# Patient Record
Sex: MALE | Race: BLACK OR AFRICAN AMERICAN | NOT HISPANIC OR LATINO | Employment: FULL TIME | URBAN - METROPOLITAN AREA
[De-identification: names, ages, dates, MRNs, and addresses within clinical notes are randomized per-mention and may not be internally consistent; named-entity substitution may affect disease eponyms.]

---

## 2017-03-08 ENCOUNTER — OFFICE VISIT (OUTPATIENT)
Dept: FAMILY MEDICINE | Facility: CLINIC | Age: 47
End: 2017-03-08
Payer: COMMERCIAL

## 2017-03-08 VITALS
WEIGHT: 231.3 LBS | HEART RATE: 64 BPM | SYSTOLIC BLOOD PRESSURE: 138 MMHG | DIASTOLIC BLOOD PRESSURE: 88 MMHG | RESPIRATION RATE: 16 BRPM | HEIGHT: 66 IN | BODY MASS INDEX: 37.17 KG/M2 | OXYGEN SATURATION: 100 % | TEMPERATURE: 98.2 F

## 2017-03-08 DIAGNOSIS — I10 ESSENTIAL HYPERTENSION WITH GOAL BLOOD PRESSURE LESS THAN 130/80: ICD-10-CM

## 2017-03-08 DIAGNOSIS — E11.65 TYPE 2 DIABETES MELLITUS WITH HYPERGLYCEMIA, WITHOUT LONG-TERM CURRENT USE OF INSULIN (H): Primary | ICD-10-CM

## 2017-03-08 DIAGNOSIS — M25.561 CHRONIC PAIN OF RIGHT KNEE: ICD-10-CM

## 2017-03-08 DIAGNOSIS — J01.10 ACUTE NON-RECURRENT FRONTAL SINUSITIS: ICD-10-CM

## 2017-03-08 DIAGNOSIS — G89.29 CHRONIC PAIN OF RIGHT KNEE: ICD-10-CM

## 2017-03-08 DIAGNOSIS — F41.8 SITUATIONAL ANXIETY: ICD-10-CM

## 2017-03-08 LAB — HBA1C MFR BLD: 11.5 % (ref 4.3–6)

## 2017-03-08 PROCEDURE — 99214 OFFICE O/P EST MOD 30 MIN: CPT | Mod: 25 | Performed by: FAMILY MEDICINE

## 2017-03-08 PROCEDURE — 83036 HEMOGLOBIN GLYCOSYLATED A1C: CPT | Performed by: FAMILY MEDICINE

## 2017-03-08 PROCEDURE — 36415 COLL VENOUS BLD VENIPUNCTURE: CPT | Performed by: FAMILY MEDICINE

## 2017-03-08 PROCEDURE — 99207 C FOOT EXAM  NO CHARGE: CPT | Mod: 25 | Performed by: FAMILY MEDICINE

## 2017-03-08 RX ORDER — AMLODIPINE BESYLATE 10 MG/1
10 TABLET ORAL DAILY
Qty: 90 TABLET | Refills: 1 | Status: SHIPPED | OUTPATIENT
Start: 2017-03-08 | End: 2017-12-04

## 2017-03-08 RX ORDER — CHLORTHALIDONE 25 MG/1
25 TABLET ORAL DAILY
Qty: 90 TABLET | Refills: 1 | Status: SHIPPED | OUTPATIENT
Start: 2017-03-08 | End: 2017-12-04

## 2017-03-08 RX ORDER — LIRAGLUTIDE 6 MG/ML
1.2 INJECTION SUBCUTANEOUS DAILY
Qty: 6 ML | Refills: 2 | Status: SHIPPED | OUTPATIENT
Start: 2017-03-08 | End: 2017-09-06

## 2017-03-08 RX ORDER — PIOGLITAZONEHYDROCHLORIDE 45 MG/1
45 TABLET ORAL DAILY
Qty: 90 TABLET | Refills: 1 | Status: SHIPPED | OUTPATIENT
Start: 2017-03-08 | End: 2017-12-04

## 2017-03-08 RX ORDER — OXYCODONE AND ACETAMINOPHEN 7.5; 325 MG/1; MG/1
1-2 TABLET ORAL 3 TIMES DAILY PRN
Qty: 150 TABLET | Refills: 0 | Status: SHIPPED | OUTPATIENT
Start: 2017-05-14 | End: 2017-06-02

## 2017-03-08 RX ORDER — LOSARTAN POTASSIUM 50 MG/1
50 TABLET ORAL DAILY
Qty: 90 TABLET | Refills: 1 | Status: SHIPPED | OUTPATIENT
Start: 2017-03-08 | End: 2017-12-04

## 2017-03-08 RX ORDER — OXYCODONE AND ACETAMINOPHEN 7.5; 325 MG/1; MG/1
1-2 TABLET ORAL 3 TIMES DAILY PRN
Qty: 150 TABLET | Refills: 0 | Status: SHIPPED | OUTPATIENT
Start: 2017-04-14 | End: 2017-03-08

## 2017-03-08 RX ORDER — AMOXICILLIN 875 MG
875 TABLET ORAL 2 TIMES DAILY
Qty: 20 TABLET | Refills: 0 | Status: SHIPPED | OUTPATIENT
Start: 2017-03-08 | End: 2017-05-24

## 2017-03-08 RX ORDER — ALPRAZOLAM 0.5 MG
TABLET ORAL
Qty: 30 TABLET | Refills: 0 | Status: SHIPPED | OUTPATIENT
Start: 2017-03-08 | End: 2018-09-10

## 2017-03-08 RX ORDER — OXYCODONE AND ACETAMINOPHEN 7.5; 325 MG/1; MG/1
1-2 TABLET ORAL 3 TIMES DAILY PRN
Qty: 150 TABLET | Refills: 0 | Status: SHIPPED | OUTPATIENT
Start: 2017-03-15 | End: 2017-03-08

## 2017-03-08 RX ORDER — GLIMEPIRIDE 4 MG/1
4 TABLET ORAL 2 TIMES DAILY
Qty: 180 TABLET | Refills: 1 | Status: SHIPPED | OUTPATIENT
Start: 2017-03-08 | End: 2017-12-04

## 2017-03-08 ASSESSMENT — ANXIETY QUESTIONNAIRES
1. FEELING NERVOUS, ANXIOUS, OR ON EDGE: NOT AT ALL
6. BECOMING EASILY ANNOYED OR IRRITABLE: NEARLY EVERY DAY
5. BEING SO RESTLESS THAT IT IS HARD TO SIT STILL: NOT AT ALL
3. WORRYING TOO MUCH ABOUT DIFFERENT THINGS: MORE THAN HALF THE DAYS
2. NOT BEING ABLE TO STOP OR CONTROL WORRYING: NOT AT ALL
IF YOU CHECKED OFF ANY PROBLEMS ON THIS QUESTIONNAIRE, HOW DIFFICULT HAVE THESE PROBLEMS MADE IT FOR YOU TO DO YOUR WORK, TAKE CARE OF THINGS AT HOME, OR GET ALONG WITH OTHER PEOPLE: SOMEWHAT DIFFICULT
7. FEELING AFRAID AS IF SOMETHING AWFUL MIGHT HAPPEN: NOT AT ALL
GAD7 TOTAL SCORE: 5

## 2017-03-08 ASSESSMENT — PAIN SCALES - GENERAL: PAINLEVEL: MODERATE PAIN (4)

## 2017-03-08 ASSESSMENT — PATIENT HEALTH QUESTIONNAIRE - PHQ9: 5. POOR APPETITE OR OVEREATING: NOT AT ALL

## 2017-03-08 NOTE — PROGRESS NOTES
SUBJECTIVE:                                                    Xavier Mcguire is a 46 year old male who presents to clinic today for the following health issues:    1. Pt states having some balance issues -    - HA on L side of forehead   - intermittent sx's    Diabetes Follow-up      Patient is checking blood sugars: rarely.  In 200s - pt requesting new test strips, lancets, meter    Diabetic concerns: BS's     Symptoms of hypoglycemia (low blood sugar): none     Paresthesias (numbness or burning in feet) or sores: No     Date of last diabetic eye exam: UTD     Hypertension Follow-up      Outpatient blood pressures are not being checked.    Low Salt Diet: no added salt     Depression Followup    Status since last visit: stress level is high    See PHQ-9 for current symptoms.  Other associated symptoms: stress    Complicating factors:   Significant life event:  No   Current substance abuse:  None  Anxiety or Panic symptoms:  No    PHQ-9  English PHQ-9   Any Language            Amount of exercise or physical activity: walking - 7-10 K steps daily    Problems taking medications regularly: pt states has missed some doses - hasn't been taking some meds    Medication side effects: none  Diet: regular (no restrictions)    SUBJECTIVE:  Here today originally scheduled to follow-up on diabetes, hypertension, obesity. Patient never started the victoza - no specific reason. I asked about insurance coverage and he said he wasn't sure. I discussed numerous times with the patient that we need to get his sugar under control and bring his weight down. He is at significantly elevated risk for severe vascular complications. 2-3 weeks ago he came down with a nonspecific illness that involved fever and chills as well as some respiratory symptoms. Had some nausea and decreased appetite. Symptoms are mostly fading away but he is left with a deep sided left headache and what sounds like some minor vertigo. Also concerned that his  "memory is affected though he did not  on any memory difficulties during our discussion. He also admits that he's under times of stress. Feels he has to do all the work around his house or nothing gets done. He takes care of multiple family members just feels like there is too much on his plate. He has a history of depression but says he does not feel depressed at all, just stressed. Knee continues to give him quite a bit of pain - the pain medication does help alleviate this.    Review of systems otherwise negative.  Past medical, family, and social history reviewed and updated in chart.    OBJECTIVE:  /88 (BP Location: Right arm, Patient Position: Right side, Cuff Size: Adult Regular)  Pulse 64  Temp 98.2  F (36.8  C) (Oral)  Resp 16  Ht 1.676 m (5' 6\")  Wt 104.9 kg (231 lb 4.8 oz)  SpO2 100%  BMI 37.33 kg/m2  Psych: Alert and oriented times 3; coherent speech, normal   rate and volume, able to articulate logical thoughts, able   to abstract reason, no tangential thoughts, no hallucinations   or delusions  His affect is appropriate  Ears normal. Throat and pharynx normal. Neck supple. No adenopathy or masses in the neck or supraclavicular regions. Sinuses non tender.   S1 and S2 normal, no murmurs, clicks, gallops or rubs. Regular rate and rhythm. Chest is clear; no wheezes or rales. No edema or JVD.  FEET: both sides normal; no swelling, tenderness or skin or vascular lesions.  Sensation is intact. Peripheral pulses are palpable. Toenails are normal.   Past labs reviewed with the patient.      ASSESSMENT / PLAN:  (E11.65) Type 2 diabetes mellitus with hyperglycemia, without long-term current use of insulin (H)  (primary encounter diagnosis)  Comment: A1c has not been well controlled and we need to get this and his weight down. Needs to take his medication and start working on lifestyle management. There is really nothing more I can add  Plan: metFORMIN (GLUCOPHAGE) 1000 MG tablet,         glimepiride " (AMARYL) 4 MG tablet, pioglitazone         (ACTOS) 45 MG tablet, order for DME, order for         DME, liraglutide (VICTOZA PEN) 18 MG/3ML soln,         Hemoglobin A1c            (J01.10) Acute non-recurrent frontal sinusitis  Comment: I think he has an ongoing sinus infection that is responsible for some of his symptoms. I like to get him started on antibiotics and if this is not helping we could consider imaging such as a CT scan  Plan: amoxicillin (AMOXIL) 875 MG tablet            (E66.01) Obesity, Class II, BMI 35-39.9, with comorbidity (H)  Comment: Discussion as above. I'm hoping lifestyle and Victoza can help  Plan:     (I10) Essential hypertension with goal blood pressure less than 130/80  Comment: Borderline control. Improved lifestyle  Plan: losartan (COZAAR) 50 MG tablet, chlorthalidone         (HYGROTON) 25 MG tablet, amLODIPine (NORVASC)         10 MG tablet            (F41.8) Situational anxiety  Comment: Discussed mechanism of action of the proposed medication, as well as potential effects, both good and bad.  Patient expressed understanding and agreed with treatment.   Plan: ALPRAZolam (XANAX) 0.5 MG tablet            (M25.561,  G89.29) Chronic pain of right knee  Comment: refilled x 3   Plan: oxyCODONE-acetaminophen (PERCOCET) 7.5-325 MG         per tablet, DISCONTINUED:         oxyCODONE-acetaminophen (PERCOCET) 7.5-325 MG         per tablet, DISCONTINUED:         oxyCODONE-acetaminophen (PERCOCET) 7.5-325 MG         per tablet            Follow up 3 months   S. Pa Terry MD    (Chart documentation completed in part with Dragon voice-recognition software.  Even though reviewed some grammatical, spelling, and word errors may remain.)

## 2017-03-08 NOTE — MR AVS SNAPSHOT
After Visit Summary   3/8/2017    Xavier Mcguire    MRN: [id number]           Patient Information     Date Of Birth          1970        Visit Information        Provider Department      3/8/2017 10:00 AM Lizett Terry MD [facility]        Today's Diagnoses     Type 2 diabetes mellitus with hyperglycemia, without long-term current use of insulin (H)    -  1    Acute non-recurrent frontal sinusitis        Obesity, Class II, BMI 35-39.9, with comorbidity (H)        Essential hypertension with goal blood pressure less than 130/80        Situational anxiety        Chronic pain of right knee           Follow-ups after your visit        Follow-up notes from your care team     Return in about 3 months (around 6/8/2017).      Who to contact     If you have questions or need follow up information about today's clinic visit or your schedule please contact [facility] directly at [phone].  Normal or non-critical lab and imaging results will be communicated to you by MyChart, letter or phone within 4 business days after the clinic has received the results. If you do not hear from us within 7 days, please contact the clinic through [facility] or phone. If you have a critical or abnormal lab result, we will notify you by phone as soon as possible.  Submit refill requests through [email or web address] or call your pharmacy and they will forward the refill request to us. Please allow 3 business days for your refill to be completed.          Additional Information About Your Visit        MyChart Information     [email or web address] gives you secure access to your electronic health record. If you see a primary care provider, you can also send messages to your care team and make appointments. If you have questions, please call your primary care clinic.  If you do not have a primary care provider, please call [phone] and they will assist you.        Care EveryWhere ID     This is your Care  "EveryWhere ID. This could be used by other organizations to access your [facility] medical records  [id number]        Your Vitals Were     Pulse Temperature Respirations Height Pulse Oximetry BMI (Body Mass Index)    64 98.2  F (36.8  C) (Oral) 16 1.676 m (5' 6\") 100% 37.33 kg/m2       Blood Pressure from Last 3 Encounters:   03/08/17 138/88   12/19/16 138/86   09/20/16 158/90    Weight from Last 3 Encounters:   03/08/17 104.9 kg (231 lb 4.8 oz)   12/19/16 109.8 kg (242 lb 1.6 oz)   09/20/16 108.7 kg (239 lb 9.6 oz)              We Performed the Following     DEPRESSION ACTION PLAN (DAP) Order [[id number]]     FOOT EXAM     Hemoglobin A1c          Today's Medication Changes          These changes are accurate as of: 3/8/17 12:47 PM.  If you have any questions, ask your nurse or doctor.               Start taking these medicines.        Dose/Directions    ALPRAZolam 0.5 MG tablet   Commonly known as:  XANAX   Used for:  Situational anxiety   Started by:  Lizett Terry MD        1/2 to 1 tab three times daily as needed   Quantity:  30 tablet   Refills:  0       amoxicillin 875 MG tablet   Commonly known as:  AMOXIL   Used for:  Acute non-recurrent frontal sinusitis   Started by:  Lizett Terry MD        Dose:  875 mg   Take 1 tablet (875 mg) by mouth 2 times daily   Quantity:  20 tablet   Refills:  0       oxyCODONE-acetaminophen 7.5-325 MG per tablet   Commonly known as:  PERCOCET   Used for:  Chronic pain of right knee   Started by:  Lizett Terry MD        Dose:  1-2 tablet   Start taking on:  5/14/2017   Take 1-2 tablets by mouth 3 times daily as needed for pain   Quantity:  150 tablet   Refills:  0         These medicines have changed or have updated prescriptions.        Dose/Directions    liraglutide 18 MG/3ML soln   Commonly known as:  VICTOZA PEN   This may have changed:  how much to take   Used for:  Type 2 diabetes mellitus with hyperglycemia, without long-term current use of " insulin (H)   Changed by:  Lizett Terry MD        Dose:  1.2 mg   Inject 1.2 mg Subcutaneous daily   Quantity:  6 mL   Refills:  2            Where to get your medicines      These medications were sent to [id number] - [place], [place] - [address] AT [facility] [address], [place] [address]     Phone:  [phone]     amLODIPine 10 MG tablet    amoxicillin 875 MG tablet    chlorthalidone 25 MG tablet    glimepiride 4 MG tablet    liraglutide 18 MG/3ML soln    losartan 50 MG tablet    metFORMIN 1000 MG tablet    pioglitazone 45 MG tablet         Some of these will need a paper prescription and others can be bought over the counter.  Ask your nurse if you have questions.     Bring a paper prescription for each of these medications     ALPRAZolam 0.5 MG tablet    order for DME    order for DME    oxyCODONE-acetaminophen 7.5-325 MG per tablet                Primary Care Provider Office Phone # Fax #    Lizett Terry [phone]       [address] [address]        Thank you!     Thank you for choosing [facility]  for your care. Our goal is always to provide you with excellent care. Hearing back from our patients is one way we can continue to improve our services. Please take a few minutes to complete the written survey that you may receive in the mail after your visit with us. Thank you!             Your Updated Medication List - Protect others around you: Learn how to safely use, store and throw away your medicines at www.disposemymeds.org.          This list is accurate as of: 3/8/17 12:47 PM.  Always use your most recent med list.                   Brand Name Dispense Instructions for use    ACE NOT PRESCRIBED (INTENTIONAL)      continuous prn for other ACE Inhibitor not prescribed due to Intolerance       albuterol 108 (90 BASE) MCG/ACT Inhaler    PROAIR  HFA/PROVENTIL HFA/VENTOLIN HFA    1 Inhaler    Inhale 2 puffs into the lungs every 4 hours as needed for shortness of breath / dyspnea or wheezing       ALPRAZolam 0.5 MG tablet    XANAX    30 tablet    1/2 to 1 tab three times daily as needed       amLODIPine 10 MG tablet    NORVASC    90 tablet    Take 1 tablet (10 mg) by mouth daily       amoxicillin 875 MG tablet    AMOXIL    20 tablet    Take 1 tablet (875 mg) by mouth 2 times daily       aspirin 81 MG tablet     100 tablet    Take 1 tablet by mouth daily.       chlorthalidone 25 MG tablet    HYGROTON    90 tablet    Take 1 tablet (25 mg) by mouth daily       fexofenadine 180 MG tablet    ALLEGRA    90 tablet    TAKE 1 TABLET BY MOUTH DAILY       glimepiride 4 MG tablet    AMARYL    180 tablet    Take 1 tablet (4 mg) by mouth 2 times daily       ipratropium - albuterol 0.5 mg/2.5 mg/3 mL 0.5-2.5 (3) MG/3ML neb solution    DUONEB    90 vial    Take 1 vial (3 mLs) by nebulization every 6 hours as needed for shortness of breath / dyspnea or wheezing       liraglutide 18 MG/3ML soln    VICTOZA PEN    6 mL    Inject 1.2 mg Subcutaneous daily       losartan 50 MG tablet    COZAAR    90 tablet    Take 1 tablet (50 mg) by mouth daily       metFORMIN 1000 MG tablet    GLUCOPHAGE    180 tablet    Take 1 tablet (1,000 mg) by mouth 2 times daily (with meals) with breakfast and dinner.       ONE TOUCH DELICA LANCETS Misc     200 each    1 Device 2 times daily       * order for DME     100 each    One touch delica strips testing 3 times a day.       * order for DME     200 each    accu check test strips and supplies to allow testing of blood sugar up to three times daily.  Refills for 1 year.       * order for DME     150 Units    Equipment being ordered: accucheck wagner strips and lancets Tests daily       * order for DME     100 Units    Equipment being ordered:  Test strips and lancets - per device and insurance plan. Tests daily. Disp: 100 Refill: 3       * order for DME      1 Device    Equipment being ordered: Nebulizer       * order for DME     1 Device    Equipment being ordered: Glucometer, per insurance plan Tests daily       * order for DME     100 Units    Equipment being ordered: Test strips and lancets - per device and insurance plan. Tests daily. Disp: 100 Refill: 3       oxyCODONE-acetaminophen 7.5-325 MG per tablet   Start taking on:  5/14/2017    PERCOCET    150 tablet    Take 1-2 tablets by mouth 3 times daily as needed for pain       pioglitazone 45 MG tablet    ACTOS    90 tablet    Take 1 tablet (45 mg) by mouth daily       STATIN NOT PRESCRIBED (INTENTIONAL)      continuous prn for other Statin not prescribed intentionally due to Other patient declines (This option does not exclude patient from measure)       STATIN NOT PRESCRIBED (INTENTIONAL)     0 each    1 each At Bedtime Statin not prescribed intentionally due to Other: not needed       tadalafil 20 MG tablet    CIALIS    10 tablet    Take 1 tablet (20 mg) by mouth daily as needed for erectile dysfunction       * Notice:  This list has 7 medication(s) that are the same as other medications prescribed for you. Read the directions carefully, and ask your doctor or other care provider to review them with you.

## 2017-03-08 NOTE — NURSING NOTE
"Chief Complaint   Patient presents with     Diabetes     Balance/ Vestibular     issue - with quick movements       Initial /88 (BP Location: Right arm, Patient Position: Right side, Cuff Size: Adult Regular)  Pulse 64  Temp 98.2  F (36.8  C) (Oral)  Resp 16  Ht 1.676 m (5' 6\")  Wt 104.9 kg (231 lb 4.8 oz)  SpO2 100%  BMI 37.33 kg/m2 Estimated body mass index is 37.33 kg/(m^2) as calculated from the following:    Height as of this encounter: 1.676 m (5' 6\").    Weight as of this encounter: 104.9 kg (231 lb 4.8 oz).  Medication Reconciliation: complete     Will Vilma RED      "

## 2017-03-09 ASSESSMENT — ANXIETY QUESTIONNAIRES: GAD7 TOTAL SCORE: 5

## 2017-03-09 ASSESSMENT — PATIENT HEALTH QUESTIONNAIRE - PHQ9: SUM OF ALL RESPONSES TO PHQ QUESTIONS 1-9: 7

## 2017-03-09 NOTE — PROGRESS NOTES
Xavier,  That sugar still looks awful.  Not sure what to say.  You have got to take your medicine and watch that diet.    STEW Terry M.D.

## 2017-04-11 ENCOUNTER — TRANSFERRED RECORDS (OUTPATIENT)
Dept: HEALTH INFORMATION MANAGEMENT | Facility: CLINIC | Age: 47
End: 2017-04-11

## 2017-05-24 ENCOUNTER — OFFICE VISIT (OUTPATIENT)
Dept: URGENT CARE | Facility: URGENT CARE | Age: 47
End: 2017-05-24
Payer: COMMERCIAL

## 2017-05-24 VITALS
HEART RATE: 98 BPM | WEIGHT: 244 LBS | BODY MASS INDEX: 39.38 KG/M2 | OXYGEN SATURATION: 97 % | TEMPERATURE: 101.5 F | DIASTOLIC BLOOD PRESSURE: 92 MMHG | SYSTOLIC BLOOD PRESSURE: 159 MMHG

## 2017-05-24 DIAGNOSIS — Z76.0 ENCOUNTER FOR MEDICATION REFILL: ICD-10-CM

## 2017-05-24 DIAGNOSIS — H66.002 ACUTE SUPPURATIVE OTITIS MEDIA OF LEFT EAR WITHOUT SPONTANEOUS RUPTURE OF TYMPANIC MEMBRANE, RECURRENCE NOT SPECIFIED: Primary | ICD-10-CM

## 2017-05-24 PROCEDURE — 99213 OFFICE O/P EST LOW 20 MIN: CPT | Performed by: NURSE PRACTITIONER

## 2017-05-24 RX ORDER — ALBUTEROL SULFATE 90 UG/1
2 AEROSOL, METERED RESPIRATORY (INHALATION) EVERY 6 HOURS
Qty: 1 INHALER | Refills: 0 | Status: SHIPPED | OUTPATIENT
Start: 2017-05-24 | End: 2019-02-11

## 2017-05-24 RX ORDER — AMOXICILLIN 500 MG/1
500 CAPSULE ORAL 3 TIMES DAILY
Qty: 21 CAPSULE | Refills: 0 | Status: SHIPPED | OUTPATIENT
Start: 2017-05-24 | End: 2017-05-31

## 2017-05-24 NOTE — MR AVS SNAPSHOT
After Visit Summary   5/24/2017    Xavier Mcguire    MRN: [id number]           Patient Information     Date Of Birth          1970        Visit Information        Provider Department      5/24/2017 8:00 PM Maia Danielle NP [facility]        Today's Diagnoses     Acute suppurative otitis media of left ear without spontaneous rupture of tympanic membrane, recurrence not specified    -  1    Encounter for medication refill          Care Instructions      Otitis Media (Middle-Ear Infection) in Adults  Otitis media is another name for a middle-ear infection. It means an infection behind your eardrum. This kind of ear infection can happen after any condition that keeps fluid from draining from the middle ear. These conditions include allergies, a cold, a sore throat, or a respiratory infection.  Middle-ear infections are common in children, but they can also happen in adults. An ear infection in an adult may mean a more serious problem than in a child. So you may need additional tests. If you have an ear infection, you should see your health care provider for treatment.  What are the types of middle-ear infections?  Infections can affect the middle ear in several ways. They are:    Acute otitis media. This middle-ear infection occurs suddenly. It causes swelling and redness. Fluid and mucus become trapped inside the ear. You can have a fever and ear pain.    Otitis media with effusion. Fluid (effusion) and mucus build up in the middle ear after the infection goes away. You may feel like your middle ear is full. This can continue for months and may affect your hearing.    Chronic otitis media with effusion. Fluid (effusion) remains in the middle ear for a long time. Or it builds up again and again, even though there is no infection. This type of middle-ear infection may be hard to treat. It may also affect your hearing.  Who is more likely to get a middle-ear infection?  You are  more likely to get an ear infection if you:    Smoke or are around someone who smokes    Have seasonal or year-round allergy symptoms    Have a cold or other upper respiratory infection  What causes a middle-ear infection?  The middle ear connects to the throat by a canal called the eustachian tube. This tube helps even out the pressure between the outer ear and the inner ear. A cold or allergy can irritate the tube or cause the area around it to swell. This can keep fluid from draining from the middle ear. The fluid builds up behind the eardrum. Bacteria and viruses can grow in this fluid. The bacteria and viruses cause the middle-ear infection.  What are the symptoms of a middle-ear infection?  Common symptoms of a middle-ear infection in adults are:    Pain in 1 or both ears    Drainage from the ear    Muffled hearing    Sore throat   You may also have a fever. Rarely, your balance can be affected.  These symptoms may be the same as for other conditions. It s important to talk with your health care provider if you think you have a middle-ear infection. If you have a high fever, severe pain behind your ear, or paralysis in your face, see your provider as soon as you can.  How is a middle-ear infection diagnosed?  Your health care provider will take a medical history and do a physical exam. He or she will look at the outer ear and eardrum with an otoscope. The otoscope is a lighted tool that lets your provider see inside the ear. A pneumatic otoscope blows a puff of air into the ear to check how well your eardrum moves. If you eardrum doesn t move well, it may mean you have fluid behind it.  Your provider may also do a test called tympanometry. This test tells how well the middle ear is working. It can find any changes in pressure in the middle ear. Your provider may test your hearing with a tuning fork.  How is a middle-ear infection treated?  A middle-ear infection may be treated with:    Antibiotics, taken by  mouth or as ear drops    Medication for pain    Decongestants, antihistamines, or nasal steroids  Your health care provider may also have you try autoinsufflation. This helps adjust the air pressure in your ear. For this, you pinch your nose and gently exhale. This forces air back through the eustachian tube.  The exact treatment for your ear infection will depend on the type of infection you have. In general, if your symptoms don t get better in 48 to 72 hours, contact your health care provider.  Middle-ear infections can cause long-term problems if not treated. They can lead to:    Infection in other parts of the head    Permanent hearing loss    Paralysis of a nerve in your face  If you have a middle-ear infection that doesn t get better, you may need to see an ear, nose, and throat specialist (otolaryngologist). You may need a CT scan or MRI to check for head and neck cancer.  Ear tubes  Sometimes fluid stays in the middle ear even after you take antibiotics and the infection goes away. In this case, your health care provider may suggest that a small tube be placed in your ear. The tube is put at the opening of the eardrum. The tube keeps fluid from building up and relieves pressure in the middle ear. It can also help you hear better. This surgery is called myringotomy. It is not often done in adults.  The tubes usually fall out on their own after 6 months to a year.    2796-4207 [employer]. [address] [place], [place] [address]. All rights reserved. This information is not intended as a substitute for professional medical care. Always follow your healthcare professional's instructions.                Follow-ups after your visit        Who to contact     If you have questions or need follow up information about today's clinic visit or your schedule please contact [facility] directly at [phone].  Normal or non-critical lab and imaging results will be communicated to you  by [employer], letter or phone within 4 business days after the clinic has received the results. If you do not hear from us within 7 days, please contact the clinic through BEETmobile or phone. If you have a critical or abnormal lab result, we will notify you by phone as soon as possible.  Submit refill requests through BEETmobile or call your pharmacy and they will forward the refill request to us. Please allow 3 business days for your refill to be completed.          Additional Information About Your Visit        BEETmobile Information     BEETmobile gives you secure access to your electronic health record. If you see a primary care provider, you can also send messages to your care team and make appointments. If you have questions, please call your primary care clinic.  If you do not have a primary care provider, please call [phone] and they will assist you.        Care EveryWhere ID     This is your Care EveryWhere ID. This could be used by other organizations to access your [place] medical records  [id number]        Your Vitals Were     Pulse Temperature Pulse Oximetry BMI (Body Mass Index)          98 101.5  F (38.6  C) (Oral) 97% 39.38 kg/m2         Blood Pressure from Last 3 Encounters:   05/24/17 (!) 159/92   03/08/17 138/88   12/19/16 138/86    Weight from Last 3 Encounters:   05/24/17 244 lb (110.7 kg)   03/08/17 231 lb 4.8 oz (104.9 kg)   12/19/16 242 lb 1.6 oz (109.8 kg)              Today, you had the following     No orders found for display         Today's Medication Changes          These changes are accurate as of: 5/24/17  8:21 PM.  If you have any questions, ask your nurse or doctor.               Start taking these medicines.        Dose/Directions    amoxicillin 500 MG capsule   Commonly known as:  AMOXIL   Used for:  Acute suppurative otitis media of left ear without spontaneous rupture of tympanic membrane, recurrence not specified   Started by:  Maia Danielle NP        Dose:  500 mg   Take 1 capsule  (500 mg) by mouth 3 times daily for 7 days   Quantity:  21 capsule   Refills:  0         These medicines have changed or have updated prescriptions.        Dose/Directions    * albuterol 108 (90 BASE) MCG/ACT Inhaler   Commonly known as:  PROAIR HFA/PROVENTIL HFA/VENTOLIN HFA   This may have changed:  Another medication with the same name was added. Make sure you understand how and when to take each.   Used for:  Acute bronchitis, unspecified organism   Changed by:  Maye Dale PA-C        Dose:  2 puff   Inhale 2 puffs into the lungs every 4 hours as needed for shortness of breath / dyspnea or wheezing   Quantity:  1 Inhaler   Refills:  3       * albuterol 108 (90 BASE) MCG/ACT Inhaler   Commonly known as:  albuterol   This may have changed:  You were already taking a medication with the same name, and this prescription was added. Make sure you understand how and when to take each.   Used for:  Encounter for medication refill   Changed by:  Maia Danielle NP        Dose:  2 puff   Inhale 2 puffs into the lungs every 6 hours for 7 days   Quantity:  1 Inhaler   Refills:  0       * Notice:  This list has 2 medication(s) that are the same as other medications prescribed for you. Read the directions carefully, and ask your doctor or other care provider to review them with you.         Where to get your medicines      These medications were sent to [facility] [address] [facility] [address] [facility]  [address] [facility] [address]    Hours:  24-hours Phone:  [phone]     albuterol 108 (90 BASE) MCG/ACT Inhaler    amoxicillin 500 MG capsule                Primary Care Provider Office Phone # Fax #    Lizett Terry [phone]       [address] [address]        Thank you!     Thank you for choosing [facility]  for your care. Our goal is always to  provide you with excellent care. Hearing back from our patients is one way we can continue to improve our services. Please take a few minutes to complete the written survey that you may receive in the mail after your visit with us. Thank you!             Your Updated Medication List - Protect others around you: Learn how to safely use, store and throw away your medicines at www.disposemymeds.org.          This list is accurate as of: 5/24/17  8:21 PM.  Always use your most recent med list.                   Brand Name Dispense Instructions for use    ACE NOT PRESCRIBED (INTENTIONAL)      continuous prn for other ACE Inhibitor not prescribed due to Intolerance       * albuterol 108 (90 BASE) MCG/ACT Inhaler    PROAIR HFA/PROVENTIL HFA/VENTOLIN HFA    1 Inhaler    Inhale 2 puffs into the lungs every 4 hours as needed for shortness of breath / dyspnea or wheezing       * albuterol 108 (90 BASE) MCG/ACT Inhaler    albuterol    1 Inhaler    Inhale 2 puffs into the lungs every 6 hours for 7 days       ALPRAZolam 0.5 MG tablet    XANAX    30 tablet    1/2 to 1 tab three times daily as needed       amLODIPine 10 MG tablet    NORVASC    90 tablet    Take 1 tablet (10 mg) by mouth daily       amoxicillin 500 MG capsule    AMOXIL    21 capsule    Take 1 capsule (500 mg) by mouth 3 times daily for 7 days       aspirin 81 MG tablet     100 tablet    Take 1 tablet by mouth daily.       chlorthalidone 25 MG tablet    HYGROTON    90 tablet    Take 1 tablet (25 mg) by mouth daily       fexofenadine 180 MG tablet    ALLEGRA    90 tablet    TAKE 1 TABLET BY MOUTH DAILY       glimepiride 4 MG tablet    AMARYL    180 tablet    Take 1 tablet (4 mg) by mouth 2 times daily       ipratropium - albuterol 0.5 mg/2.5 mg/3 mL 0.5-2.5 (3) MG/3ML neb solution    DUONEB    90 vial    Take 1 vial (3 mLs) by nebulization every 6 hours as needed for shortness of breath / dyspnea or wheezing       liraglutide 18 MG/3ML soln    VICTOZA PEN    6 mL     Inject 1.2 mg Subcutaneous daily       losartan 50 MG tablet    COZAAR    90 tablet    Take 1 tablet (50 mg) by mouth daily       metFORMIN 1000 MG tablet    GLUCOPHAGE    180 tablet    Take 1 tablet (1,000 mg) by mouth 2 times daily (with meals) with breakfast and dinner.       ONE TOUCH DELICA LANCETS Misc     200 each    1 Device 2 times daily       * order for DME     100 each    One touch delica strips testing 3 times a day.       * order for DME     200 each    accu check test strips and supplies to allow testing of blood sugar up to three times daily.  Refills for 1 year.       * order for DME     150 Units    Equipment being ordered: accucheck wagner strips and lancets Tests daily       * order for DME     100 Units    Equipment being ordered:  Test strips and lancets - per device and insurance plan. Tests daily. Disp: 100 Refill: 3       * order for DME     1 Device    Equipment being ordered: Nebulizer       * order for DME     1 Device    Equipment being ordered: Glucometer, per insurance plan Tests daily       * order for DME     100 Units    Equipment being ordered: Test strips and lancets - per device and insurance plan. Tests daily. Disp: 100 Refill: 3       oxyCODONE-acetaminophen 7.5-325 MG per tablet    PERCOCET    150 tablet    Take 1-2 tablets by mouth 3 times daily as needed for pain       pioglitazone 45 MG tablet    ACTOS    90 tablet    Take 1 tablet (45 mg) by mouth daily       STATIN NOT PRESCRIBED (INTENTIONAL)      continuous prn for other Statin not prescribed intentionally due to Other patient declines (This option does not exclude patient from measure)       STATIN NOT PRESCRIBED (INTENTIONAL)     0 each    1 each At Bedtime Statin not prescribed intentionally due to Other: not needed       tadalafil 20 MG tablet    CIALIS    10 tablet    Take 1 tablet (20 mg) by mouth daily as needed for erectile dysfunction       * Notice:  This list has 9 medication(s) that are the same as other  medications prescribed for you. Read the directions carefully, and ask your doctor or other care provider to review them with you.

## 2017-05-25 NOTE — PROGRESS NOTES
SUBJECTIVE:                                                    Xavier Mcguire is a 47 year old male who presents to clinic today for the following health issues:      RESPIRATORY SYMPTOMS      Duration: 1-2 days    Description  nasal congestion, rhinorrhea, sore throat, cough, fever, chills, ear pain left and fatigue/malaise    Severity: moderate    Accompanying signs and symptoms: None    History (predisposing factors):  none    Precipitating or alleviating factors: None    Therapies tried and outcome:  rest and fluids OTC NSAID        Allergies   Allergen Reactions     Lisinopril Other (See Comments)       Past Medical History:   Diagnosis Date     Diabetes 2005     HTN (hypertension)      Hypertension goal BP (blood pressure) < 130/80 3/1/2011     Major depressive disorder, single episode, severe, without mention of psychotic behavior 8/5/2010     Obesity          Current Outpatient Prescriptions on File Prior to Visit:  ALPRAZolam (XANAX) 0.5 MG tablet 1/2 to 1 tab three times daily as needed   metFORMIN (GLUCOPHAGE) 1000 MG tablet Take 1 tablet (1,000 mg) by mouth 2 times daily (with meals) with breakfast and dinner.   glimepiride (AMARYL) 4 MG tablet Take 1 tablet (4 mg) by mouth 2 times daily   pioglitazone (ACTOS) 45 MG tablet Take 1 tablet (45 mg) by mouth daily   losartan (COZAAR) 50 MG tablet Take 1 tablet (50 mg) by mouth daily   chlorthalidone (HYGROTON) 25 MG tablet Take 1 tablet (25 mg) by mouth daily   amLODIPine (NORVASC) 10 MG tablet Take 1 tablet (10 mg) by mouth daily   order for DME Equipment being ordered: Glucometer, per insurance planTests daily   order for DME Equipment being ordered: Test strips and lancets - per device and insurance plan.Tests daily.Disp: 100Refill: 3   liraglutide (VICTOZA PEN) 18 MG/3ML soln Inject 1.2 mg Subcutaneous daily   oxyCODONE-acetaminophen (PERCOCET) 7.5-325 MG per tablet Take 1-2 tablets by mouth 3 times daily as needed for pain   STATIN NOT  PRESCRIBED, INTENTIONAL, continuous prn for other Statin not prescribed intentionally due to Other patient declines (This option does not exclude patient from measure)   ipratropium - albuterol 0.5 mg/2.5 mg/3 mL (DUONEB) 0.5-2.5 (3) MG/3ML nebulization Take 1 vial (3 mLs) by nebulization every 6 hours as needed for shortness of breath / dyspnea or wheezing   order for DME Equipment being ordered: Nebulizer   albuterol (PROAIR HFA, PROVENTIL HFA, VENTOLIN HFA) 108 (90 BASE) MCG/ACT inhaler Inhale 2 puffs into the lungs every 4 hours as needed for shortness of breath / dyspnea or wheezing   ACE NOT PRESCRIBED, INTENTIONAL, continuous prn for other ACE Inhibitor not prescribed due to Intolerance   tadalafil (CIALIS) 20 MG tablet Take 1 tablet (20 mg) by mouth daily as needed for erectile dysfunction   order for DME Equipment being ordered: Test strips and lancets - per device and insurance plan.Tests daily.Disp: 100Refill: 3   order for DME Equipment being ordered: accucheck wagner strips and lancetsTests daily   fexofenadine (ALLEGRA) 180 MG tablet TAKE 1 TABLET BY MOUTH DAILY   STATIN NOT PRESCRIBED, INTENTIONAL, 1 each At Bedtime Statin not prescribed intentionally due to Other: not needed   ONE TOUCH DELICA LANCETS MISC 1 Device 2 times daily   ORDER FOR DME accu check test strips and supplies to allow testing of blood sugar up to three times daily.  Refills for 1 year.   aspirin 81 MG tablet Take 1 tablet by mouth daily.   ORDER FOR DME One touch delica strips testing 3 times a day.     No current facility-administered medications on file prior to visit.     Social History   Substance Use Topics     Smoking status: Never Smoker     Smokeless tobacco: Never Used     Alcohol use 2.0 oz/week     4 Standard drinks or equivalent per week      Comment: social - heavy drinker on some weekends       ROS:   Constitutional: no fevers  ENT: as above    OBJECTIVE:  BP (!) 159/92 (BP Location: Left arm, Patient Position:  Chair, Cuff Size: Adult Large)  Pulse 98  Temp 101.5  F (38.6  C) (Oral)  Wt 244 lb (110.7 kg)  SpO2 97%  BMI 39.38 kg/m2   General:   awake, alert, and cooperative.  NAD.   Head: Normocephalic, atraumatic.  Eyes: Conjunctiva clear,   ENT: . RT Canal is intact, right TM is grey and translucent,  LEFT CANAL is intact, left  TM is bulging with erythema.  Neuro: Alert and oriented - normal speech.    ASSESSMENT:    ICD-10-CM    1. Acute suppurative otitis media of left ear without spontaneous rupture of tympanic membrane, recurrence not specified H66.002 amoxicillin (AMOXIL) 500 MG capsule   2. Encounter for medication refill Z76.0 albuterol (ALBUTEROL) 108 (90 BASE) MCG/ACT Inhaler       PLAN:    Antibiotics as prescribed.    Patient educational/instructional material provided including reasons for follow-up    The patient indicates understanding of these issues and agrees with the plan.  Maia Danielle  FNP-BC  Family Nurse Practitoner

## 2017-05-25 NOTE — NURSING NOTE
"Chief Complaint   Patient presents with     URI     congestion, running nose, sore throat, fever       Initial BP (!) 159/92 (BP Location: Left arm, Patient Position: Chair, Cuff Size: Adult Large)  Pulse 98  Temp 101.5  F (38.6  C) (Oral)  Wt 244 lb (110.7 kg)  SpO2 97%  BMI 39.38 kg/m2 Estimated body mass index is 39.38 kg/(m^2) as calculated from the following:    Height as of 3/8/17: 5' 6\" (1.676 m).    Weight as of this encounter: 244 lb (110.7 kg).  Medication Reconciliation: complete   Lyn Edwards CMA      "

## 2017-06-02 ENCOUNTER — OFFICE VISIT (OUTPATIENT)
Dept: FAMILY MEDICINE | Facility: CLINIC | Age: 47
End: 2017-06-02
Payer: COMMERCIAL

## 2017-06-02 VITALS
WEIGHT: 237.9 LBS | HEART RATE: 80 BPM | HEIGHT: 66 IN | DIASTOLIC BLOOD PRESSURE: 70 MMHG | RESPIRATION RATE: 16 BRPM | TEMPERATURE: 98.1 F | SYSTOLIC BLOOD PRESSURE: 138 MMHG | BODY MASS INDEX: 38.23 KG/M2 | OXYGEN SATURATION: 98 %

## 2017-06-02 DIAGNOSIS — M25.561 CHRONIC PAIN OF RIGHT KNEE: ICD-10-CM

## 2017-06-02 DIAGNOSIS — G89.29 CHRONIC PAIN OF RIGHT KNEE: ICD-10-CM

## 2017-06-02 DIAGNOSIS — G89.4 CHRONIC PAIN SYNDROME: ICD-10-CM

## 2017-06-02 DIAGNOSIS — I10 ESSENTIAL HYPERTENSION WITH GOAL BLOOD PRESSURE LESS THAN 130/80: ICD-10-CM

## 2017-06-02 DIAGNOSIS — E11.65 TYPE 2 DIABETES MELLITUS WITH HYPERGLYCEMIA, WITHOUT LONG-TERM CURRENT USE OF INSULIN (H): Primary | ICD-10-CM

## 2017-06-02 LAB — LDLC SERPL DIRECT ASSAY-MCNC: 76 MG/DL

## 2017-06-02 PROCEDURE — 99214 OFFICE O/P EST MOD 30 MIN: CPT | Performed by: FAMILY MEDICINE

## 2017-06-02 PROCEDURE — 36415 COLL VENOUS BLD VENIPUNCTURE: CPT | Performed by: FAMILY MEDICINE

## 2017-06-02 PROCEDURE — 83721 ASSAY OF BLOOD LIPOPROTEIN: CPT | Performed by: FAMILY MEDICINE

## 2017-06-02 PROCEDURE — 83036 HEMOGLOBIN GLYCOSYLATED A1C: CPT | Performed by: FAMILY MEDICINE

## 2017-06-02 PROCEDURE — 82043 UR ALBUMIN QUANTITATIVE: CPT | Performed by: FAMILY MEDICINE

## 2017-06-02 RX ORDER — OXYCODONE AND ACETAMINOPHEN 7.5; 325 MG/1; MG/1
1-2 TABLET ORAL 3 TIMES DAILY PRN
Qty: 150 TABLET | Refills: 0 | Status: SHIPPED | OUTPATIENT
Start: 2017-06-13 | End: 2017-06-02

## 2017-06-02 RX ORDER — OXYCODONE AND ACETAMINOPHEN 7.5; 325 MG/1; MG/1
1-2 TABLET ORAL 3 TIMES DAILY PRN
Qty: 150 TABLET | Refills: 0 | Status: SHIPPED | OUTPATIENT
Start: 2017-07-12 | End: 2017-06-02

## 2017-06-02 RX ORDER — OXYCODONE AND ACETAMINOPHEN 7.5; 325 MG/1; MG/1
1-2 TABLET ORAL 3 TIMES DAILY PRN
Qty: 150 TABLET | Refills: 0 | Status: SHIPPED | OUTPATIENT
Start: 2017-08-12 | End: 2017-09-06

## 2017-06-02 ASSESSMENT — PAIN SCALES - GENERAL: PAINLEVEL: NO PAIN (0)

## 2017-06-02 NOTE — NURSING NOTE
"Chief Complaint   Patient presents with     Diabetes       Initial /70 (BP Location: Right arm, Patient Position: Right side, Cuff Size: Adult Regular)  Pulse 80  Temp 98.1  F (36.7  C) (Oral)  Resp 16  Ht 1.676 m (5' 6\")  Wt 107.9 kg (237 lb 14.4 oz)  SpO2 98%  BMI 38.4 kg/m2 Estimated body mass index is 38.4 kg/(m^2) as calculated from the following:    Height as of this encounter: 1.676 m (5' 6\").    Weight as of this encounter: 107.9 kg (237 lb 14.4 oz).  Medication Reconciliation: complete     Will Vilma RED      "

## 2017-06-02 NOTE — MR AVS SNAPSHOT
After Visit Summary   6/2/2017    Xavier Mcguire    MRN: [id number]           Patient Information     Date Of Birth          1970        Visit Information        Provider Department      6/2/2017 3:00 PM Lizett Terry MD [facility]        Today's Diagnoses     Type 2 diabetes mellitus with hyperglycemia, without long-term current use of insulin (H)    -  1    Essential hypertension with goal blood pressure less than 130/80        Chronic pain of right knee        Chronic pain syndrome           Follow-ups after your visit        Follow-up notes from your care team     Return in about 3 months (around 9/2/2017).      Who to contact     If you have questions or need follow up information about today's clinic visit or your schedule please contact [facility] directly at [phone].  Normal or non-critical lab and imaging results will be communicated to you by MyChart, letter or phone within 4 business days after the clinic has received the results. If you do not hear from us within 7 days, please contact the clinic through MyChart or phone. If you have a critical or abnormal lab result, we will notify you by phone as soon as possible.  Submit refill requests through [employer] or call your pharmacy and they will forward the refill request to us. Please allow 3 business days for your refill to be completed.          Additional Information About Your Visit        MyChart Information     [employer] gives you secure access to your electronic health record. If you see a primary care provider, you can also send messages to your care team and make appointments. If you have questions, please call your primary care clinic.  If you do not have a primary care provider, please call [phone] and they will assist you.        Care EveryWhere ID     This is your Care EveryWhere ID. This could be used by other organizations to access your [facility]  "records  [id number]        Your Vitals Were     Pulse Temperature Respirations Height Pulse Oximetry BMI (Body Mass Index)    80 98.1  F (36.7  C) (Oral) 16 1.676 m (5' 6\") 98% 38.4 kg/m2       Blood Pressure from Last 3 Encounters:   06/02/17 138/70   05/24/17 (!) 159/92   03/08/17 138/88    Weight from Last 3 Encounters:   06/02/17 107.9 kg (237 lb 14.4 oz)   05/24/17 110.7 kg (244 lb)   03/08/17 104.9 kg (231 lb 4.8 oz)              We Performed the Following     Albumin Random Urine Quantitative     Hemoglobin A1c     LDL cholesterol direct          Today's Medication Changes          These changes are accurate as of: 6/2/17 11:59 PM.  If you have any questions, ask your nurse or doctor.               Start taking these medicines.        Dose/Directions    oxyCODONE-acetaminophen 7.5-325 MG per tablet   Commonly known as:  PERCOCET   Used for:  Chronic pain of right knee   Started by:  Lizett Terry MD        Dose:  1-2 tablet   Start taking on:  8/12/2017   Take 1-2 tablets by mouth 3 times daily as needed for pain   Quantity:  150 tablet   Refills:  0            Where to get your medicines      Some of these will need a paper prescription and others can be bought over the counter.  Ask your nurse if you have questions.     Bring a paper prescription for each of these medications     oxyCODONE-acetaminophen 7.5-325 MG per tablet                Primary Care Provider Office Phone # Fax #    Lizett Terry [phone]       [address] [address]        Thank you!     Thank you for choosing [facility]  for your care. Our goal is always to provide you with excellent care. Hearing back from our patients is one way we can continue to improve our services. Please take a few minutes to complete the written survey that you may receive in the mail after your visit with us. Thank you!             Your Updated Medication " List - Protect others around you: Learn how to safely use, store and throw away your medicines at www.disposemymeds.org.          This list is accurate as of: 6/2/17 11:59 PM.  Always use your most recent med list.                   Brand Name Dispense Instructions for use    ACE NOT PRESCRIBED (INTENTIONAL)      continuous prn for other ACE Inhibitor not prescribed due to Intolerance       * albuterol 108 (90 BASE) MCG/ACT Inhaler    PROAIR HFA/PROVENTIL HFA/VENTOLIN HFA    1 Inhaler    Inhale 2 puffs into the lungs every 4 hours as needed for shortness of breath / dyspnea or wheezing       * albuterol 108 (90 BASE) MCG/ACT Inhaler    albuterol    1 Inhaler    Inhale 2 puffs into the lungs every 6 hours for 7 days       ALPRAZolam 0.5 MG tablet    XANAX    30 tablet    1/2 to 1 tab three times daily as needed       amLODIPine 10 MG tablet    NORVASC    90 tablet    Take 1 tablet (10 mg) by mouth daily       aspirin 81 MG tablet     100 tablet    Take 1 tablet by mouth daily.       chlorthalidone 25 MG tablet    HYGROTON    90 tablet    Take 1 tablet (25 mg) by mouth daily       fexofenadine 180 MG tablet    ALLEGRA    90 tablet    TAKE 1 TABLET BY MOUTH DAILY       glimepiride 4 MG tablet    AMARYL    180 tablet    Take 1 tablet (4 mg) by mouth 2 times daily       ipratropium - albuterol 0.5 mg/2.5 mg/3 mL 0.5-2.5 (3) MG/3ML neb solution    DUONEB    90 vial    Take 1 vial (3 mLs) by nebulization every 6 hours as needed for shortness of breath / dyspnea or wheezing       liraglutide 18 MG/3ML soln    VICTOZA PEN    6 mL    Inject 1.2 mg Subcutaneous daily       losartan 50 MG tablet    COZAAR    90 tablet    Take 1 tablet (50 mg) by mouth daily       metFORMIN 1000 MG tablet    GLUCOPHAGE    180 tablet    Take 1 tablet (1,000 mg) by mouth 2 times daily (with meals) with breakfast and dinner.       ONE TOUCH DELICA LANCETS Misc     200 each    1 Device 2 times daily       * order for DME     1 Device    Equipment  being ordered: Nebulizer       * order for DME     1 Device    Equipment being ordered: Glucometer, per insurance plan Tests daily       * order for DME     100 Units    Equipment being ordered: Test strips and lancets - per device and insurance plan. Tests daily. Disp: 100 Refill: 3       oxyCODONE-acetaminophen 7.5-325 MG per tablet   Start taking on:  8/12/2017    PERCOCET    150 tablet    Take 1-2 tablets by mouth 3 times daily as needed for pain       pioglitazone 45 MG tablet    ACTOS    90 tablet    Take 1 tablet (45 mg) by mouth daily       STATIN NOT PRESCRIBED (INTENTIONAL)      continuous prn for other Statin not prescribed intentionally due to Other patient declines (This option does not exclude patient from measure)       STATIN NOT PRESCRIBED (INTENTIONAL)     0 each    1 each At Bedtime Statin not prescribed intentionally due to Other: not needed       tadalafil 20 MG tablet    CIALIS    10 tablet    Take 1 tablet (20 mg) by mouth daily as needed for erectile dysfunction       * Notice:  This list has 5 medication(s) that are the same as other medications prescribed for you. Read the directions carefully, and ask your doctor or other care provider to review them with you.

## 2017-06-05 LAB
CREAT UR-MCNC: 206 MG/DL
MICROALBUMIN UR-MCNC: 9 MG/L
MICROALBUMIN/CREAT UR: 4.15 MG/G CR (ref 0–17)

## 2017-06-07 LAB — HBA1C MFR BLD: 10.5 % (ref 4.3–6)

## 2017-06-07 NOTE — PROGRESS NOTES
Xavier,  Well, that sugar level really isn't any better, as expected. I cannot reiterate strongly enough how important it is for you to actually take the medicine and work on diet and exercise. As we discussed once a heart attack or stroke or kidney disease happens, there is no going back.  These are permanent things that will greatly affecting the rest of your life.  It is time to get serious about this.  STEW Terry M.D.

## 2017-09-06 ENCOUNTER — DOCUMENTATION ONLY (OUTPATIENT)
Dept: LAB | Facility: CLINIC | Age: 47
End: 2017-09-06

## 2017-09-06 ENCOUNTER — OFFICE VISIT (OUTPATIENT)
Dept: FAMILY MEDICINE | Facility: CLINIC | Age: 47
End: 2017-09-06
Payer: COMMERCIAL

## 2017-09-06 VITALS
HEIGHT: 66 IN | WEIGHT: 240.2 LBS | HEART RATE: 86 BPM | OXYGEN SATURATION: 100 % | SYSTOLIC BLOOD PRESSURE: 136 MMHG | DIASTOLIC BLOOD PRESSURE: 84 MMHG | RESPIRATION RATE: 16 BRPM | BODY MASS INDEX: 38.6 KG/M2 | TEMPERATURE: 98.1 F

## 2017-09-06 DIAGNOSIS — G89.29 CHRONIC PAIN OF RIGHT KNEE: ICD-10-CM

## 2017-09-06 DIAGNOSIS — E11.65 TYPE 2 DIABETES MELLITUS WITH HYPERGLYCEMIA, WITHOUT LONG-TERM CURRENT USE OF INSULIN (H): Primary | ICD-10-CM

## 2017-09-06 DIAGNOSIS — G89.4 CHRONIC PAIN SYNDROME: ICD-10-CM

## 2017-09-06 DIAGNOSIS — I10 ESSENTIAL HYPERTENSION WITH GOAL BLOOD PRESSURE LESS THAN 130/80: ICD-10-CM

## 2017-09-06 DIAGNOSIS — M25.561 CHRONIC PAIN OF RIGHT KNEE: ICD-10-CM

## 2017-09-06 DIAGNOSIS — E11.65 TYPE 2 DIABETES MELLITUS WITH HYPERGLYCEMIA, WITHOUT LONG-TERM CURRENT USE OF INSULIN (H): ICD-10-CM

## 2017-09-06 LAB
ALBUMIN SERPL-MCNC: 3.4 G/DL (ref 3.4–5)
ALP SERPL-CCNC: 73 U/L (ref 40–150)
ALT SERPL W P-5'-P-CCNC: 25 U/L (ref 0–70)
ANION GAP SERPL CALCULATED.3IONS-SCNC: 9 MMOL/L (ref 3–14)
AST SERPL W P-5'-P-CCNC: 16 U/L (ref 0–45)
BILIRUB SERPL-MCNC: 0.4 MG/DL (ref 0.2–1.3)
BUN SERPL-MCNC: 15 MG/DL (ref 7–30)
CALCIUM SERPL-MCNC: 9 MG/DL (ref 8.5–10.1)
CHLORIDE SERPL-SCNC: 103 MMOL/L (ref 94–109)
CHOLEST SERPL-MCNC: 147 MG/DL
CO2 SERPL-SCNC: 27 MMOL/L (ref 20–32)
CREAT SERPL-MCNC: 0.94 MG/DL (ref 0.66–1.25)
GFR SERPL CREATININE-BSD FRML MDRD: 86 ML/MIN/1.7M2
GLUCOSE SERPL-MCNC: 228 MG/DL (ref 70–99)
HBA1C MFR BLD: 10.6 % (ref 4.3–6)
HDLC SERPL-MCNC: 38 MG/DL
LDLC SERPL CALC-MCNC: 49 MG/DL
NONHDLC SERPL-MCNC: 109 MG/DL
POTASSIUM SERPL-SCNC: 3.9 MMOL/L (ref 3.4–5.3)
PROT SERPL-MCNC: 7 G/DL (ref 6.8–8.8)
SODIUM SERPL-SCNC: 139 MMOL/L (ref 133–144)
TRIGL SERPL-MCNC: 302 MG/DL

## 2017-09-06 PROCEDURE — 99214 OFFICE O/P EST MOD 30 MIN: CPT | Performed by: FAMILY MEDICINE

## 2017-09-06 PROCEDURE — 80053 COMPREHEN METABOLIC PANEL: CPT | Performed by: FAMILY MEDICINE

## 2017-09-06 PROCEDURE — 99000 SPECIMEN HANDLING OFFICE-LAB: CPT | Performed by: FAMILY MEDICINE

## 2017-09-06 PROCEDURE — 80307 DRUG TEST PRSMV CHEM ANLYZR: CPT | Mod: 90 | Performed by: FAMILY MEDICINE

## 2017-09-06 PROCEDURE — 80061 LIPID PANEL: CPT | Performed by: FAMILY MEDICINE

## 2017-09-06 PROCEDURE — 36415 COLL VENOUS BLD VENIPUNCTURE: CPT | Performed by: FAMILY MEDICINE

## 2017-09-06 PROCEDURE — 83036 HEMOGLOBIN GLYCOSYLATED A1C: CPT | Performed by: FAMILY MEDICINE

## 2017-09-06 RX ORDER — OXYCODONE AND ACETAMINOPHEN 7.5; 325 MG/1; MG/1
1-2 TABLET ORAL 3 TIMES DAILY PRN
Qty: 150 TABLET | Refills: 0 | Status: SHIPPED | OUTPATIENT
Start: 2017-10-10 | End: 2017-09-06

## 2017-09-06 RX ORDER — OXYCODONE AND ACETAMINOPHEN 7.5; 325 MG/1; MG/1
1-2 TABLET ORAL 3 TIMES DAILY PRN
Qty: 150 TABLET | Refills: 0 | Status: SHIPPED | OUTPATIENT
Start: 2017-09-11 | End: 2017-09-06

## 2017-09-06 RX ORDER — OXYCODONE AND ACETAMINOPHEN 7.5; 325 MG/1; MG/1
1-2 TABLET ORAL 3 TIMES DAILY PRN
Qty: 150 TABLET | Refills: 0 | Status: SHIPPED | OUTPATIENT
Start: 2017-11-10 | End: 2017-12-04

## 2017-09-06 ASSESSMENT — PAIN SCALES - GENERAL: PAINLEVEL: MODERATE PAIN (4)

## 2017-09-06 NOTE — MR AVS SNAPSHOT
After Visit Summary   9/6/2017    Xavier Mcguire    MRN: [id number]           Patient Information     Date Of Birth          1970        Visit Information        Provider Department      9/6/2017 4:20 PM Lizett Terry MD [facility]        Today's Diagnoses     Type 2 diabetes mellitus with hyperglycemia, without long-term current use of insulin (H)    -  1    Obesity, Class II, BMI 35-39.9, with comorbidity        Essential hypertension with goal blood pressure less than 130/80        Chronic pain of right knee           Follow-ups after your visit        Follow-up notes from your care team     Return in about 3 months (around 12/6/2017).      Who to contact     If you have questions or need follow up information about today's clinic visit or your schedule please contact [facility] directly at [phone].  Normal or non-critical lab and imaging results will be communicated to you by [employer], letter or phone within 4 business days after the clinic has received the results. If you do not hear from us within 7 days, please contact the clinic through [employer] or phone. If you have a critical or abnormal lab result, we will notify you by phone as soon as possible.  Submit refill requests through [employer] or call your pharmacy and they will forward the refill request to us. Please allow 3 business days for your refill to be completed.          Additional Information About Your Visit        MyChart Information     [employer] gives you secure access to your electronic health record. If you see a primary care provider, you can also send messages to your care team and make appointments. If you have questions, please call your primary care clinic.  If you do not have a primary care provider, please call [phone] and they will assist you.        Care EveryWhere ID     This is your Care EveryWhere ID. This could be used by other organizations to access your  "[place] medical records  [id number]        Your Vitals Were     Pulse Temperature Respirations Height Pulse Oximetry BMI (Body Mass Index)    86 98.1  F (36.7  C) (Oral) 16 5' 6\" (1.676 m) 100% 38.77 kg/m2       Blood Pressure from Last 3 Encounters:   09/06/17 136/84   06/02/17 138/70   05/24/17 (!) 159/92    Weight from Last 3 Encounters:   09/06/17 240 lb 3.2 oz (109 kg)   06/02/17 237 lb 14.4 oz (107.9 kg)   05/24/17 244 lb (110.7 kg)              Today, you had the following     No orders found for display         Today's Medication Changes          These changes are accurate as of: 9/6/17  5:00 PM.  If you have any questions, ask your nurse or doctor.               Start taking these medicines.        Dose/Directions    oxyCODONE-acetaminophen 7.5-325 MG per tablet   Commonly known as:  PERCOCET   Used for:  Chronic pain of right knee   Started by:  Lizett Terry MD        Dose:  1-2 tablet   Start taking on:  11/10/2017   Take 1-2 tablets by mouth 3 times daily as needed for pain   Quantity:  150 tablet   Refills:  0            Where to get your medicines      Some of these will need a paper prescription and others can be bought over the counter.  Ask your nurse if you have questions.     Bring a paper prescription for each of these medications     oxyCODONE-acetaminophen 7.5-325 MG per tablet                Primary Care Provider Office Phone # Fax #    Lizett Terry [phone] [address] [facility] [address]        Equal Access to Services     [facility] AH: Hadii mary peters Sorocco, waaxda luqadaha, qaybta kaalmada selam, jacobo elias. So [facility] [phone].    ATENCIÓN: Si habla español, tiene a hunt disposición servicios gratuitos de asistencia lingüística. Llame al [phone].    We comply with applicable federal civil rights laws and [place] laws. We do not discriminate on the basis of race, color, national " origin, age, disability sex, sexual orientation or gender identity.            Thank you!     Thank you for choosing [facility]  for your care. Our goal is always to provide you with excellent care. Hearing back from our patients is one way we can continue to improve our services. Please take a few minutes to complete the written survey that you may receive in the mail after your visit with us. Thank you!             Your Updated Medication List - Protect others around you: Learn how to safely use, store and throw away your medicines at www.disposemiMedia Comun[employer]azioneeds.org.          This list is accurate as of: 9/6/17  5:00 PM.  Always use your most recent med list.                   Brand Name Dispense Instructions for use Diagnosis    ACE NOT PRESCRIBED (INTENTIONAL)      continuous prn for other ACE Inhibitor not prescribed due to Intolerance        albuterol 108 (90 BASE) MCG/ACT Inhaler    PROAIR HFA/PROVENTIL HFA/VENTOLIN HFA    1 Inhaler    Inhale 2 puffs into the lungs every 4 hours as needed for shortness of breath / dyspnea or wheezing    Acute bronchitis, unspecified organism       ALPRAZolam 0.5 MG tablet    XANAX    30 tablet    1/2 to 1 tab three times daily as needed    Situational anxiety       amLODIPine 10 MG tablet    NORVASC    90 tablet    Take 1 tablet (10 mg) by mouth daily    Essential hypertension with goal blood pressure less than 130/80       aspirin 81 MG tablet     100 tablet    Take 1 tablet by mouth daily.    Type 2 diabetes, HbA1c goal < 7% (H)       chlorthalidone 25 MG tablet    HYGROTON    90 tablet    Take 1 tablet (25 mg) by mouth daily    Essential hypertension with goal blood pressure less than 130/80       fexofenadine 180 MG tablet    ALLEGRA    90 tablet    TAKE 1 TABLET BY MOUTH DAILY    Allergic rhinitis, cause unspecified       glimepiride 4 MG tablet    AMARYL    180 tablet    Take 1 tablet (4 mg) by mouth 2 times daily    Type 2 diabetes mellitus with hyperglycemia,  without long-term current use of insulin (H)       ipratropium - albuterol 0.5 mg/2.5 mg/3 mL 0.5-2.5 (3) MG/3ML neb solution    DUONEB    90 vial    Take 1 vial (3 mLs) by nebulization every 6 hours as needed for shortness of breath / dyspnea or wheezing    Acute bronchitis, unspecified organism       losartan 50 MG tablet    COZAAR    90 tablet    Take 1 tablet (50 mg) by mouth daily    Essential hypertension with goal blood pressure less than 130/80       metFORMIN 1000 MG tablet    GLUCOPHAGE    180 tablet    Take 1 tablet (1,000 mg) by mouth 2 times daily (with meals) with breakfast and dinner.    Type 2 diabetes mellitus with hyperglycemia, without long-term current use of insulin (H)       ONE TOUCH DELICA LANCETS Misc     200 each    1 Device 2 times daily    Type 2 diabetes, HbA1c goal < 7% (H)       * order for DME     1 Device    Equipment being ordered: Nebulizer    Acute bronchitis, unspecified organism       * order for DME     1 Device    Equipment being ordered: Glucometer, per insurance plan Tests daily    Type 2 diabetes mellitus with hyperglycemia, without long-term current use of insulin (H)       * order for DME     100 Units    Equipment being ordered: Test strips and lancets - per device and insurance plan. Tests daily. Disp: 100 Refill: 3    Type 2 diabetes mellitus with hyperglycemia, without long-term current use of insulin (H)       oxyCODONE-acetaminophen 7.5-325 MG per tablet   Start taking on:  11/10/2017    PERCOCET    150 tablet    Take 1-2 tablets by mouth 3 times daily as needed for pain    Chronic pain of right knee       pioglitazone 45 MG tablet    ACTOS    90 tablet    Take 1 tablet (45 mg) by mouth daily    Type 2 diabetes mellitus with hyperglycemia, without long-term current use of insulin (H)       STATIN NOT PRESCRIBED (INTENTIONAL)      continuous prn for other Statin not prescribed intentionally due to Other patient declines (This option does not exclude patient from  measure)        STATIN NOT PRESCRIBED (INTENTIONAL)     0 each    1 each At Bedtime Statin not prescribed intentionally due to Other: not needed    Type 2 diabetes, HbA1c goal < 7% (H)       tadalafil 20 MG tablet    CIALIS    10 tablet    Take 1 tablet (20 mg) by mouth daily as needed for erectile dysfunction    Erectile dysfunction, unspecified erectile dysfunction type       * Notice:  This list has 3 medication(s) that are the same as other medications prescribed for you. Read the directions carefully, and ask your doctor or other care provider to review them with you.

## 2017-09-06 NOTE — NURSING NOTE
"Chief Complaint   Patient presents with     Recheck Medication       Initial /84 (BP Location: Right arm, Patient Position: Right side, Cuff Size: Adult Regular)  Pulse 86  Temp 98.1  F (36.7  C) (Oral)  Resp 16  Ht 1.676 m (5' 6\")  Wt 109 kg (240 lb 3.2 oz)  SpO2 100%  BMI 38.77 kg/m2 Estimated body mass index is 38.77 kg/(m^2) as calculated from the following:    Height as of this encounter: 1.676 m (5' 6\").    Weight as of this encounter: 109 kg (240 lb 3.2 oz).  Medication Reconciliation: complete     Will Vilma RED      "

## 2017-09-06 NOTE — PROGRESS NOTES
SUBJECTIVE:   Xavier Mcguier is a 47 year old male who presents to clinic today for the following health issues:      Diabetes Follow-up      Patient is checking blood sugars: rarely, have been high    Diabetic concerns: high BS's     Symptoms of hypoglycemia (low blood sugar): none     Paresthesias (numbness or burning in feet) or sores: No     Date of last diabetic eye exam: UTD    Hypertension Follow-up      Outpatient blood pressures are not being checked.    Low Salt Diet: low salt    Chronic Pain Follow-Up       Type / Location of Pain: R knee  Analgesia/pain control:       Recent changes:  intermittent      Overall control: intermittent  Activity level/function:      Daily activities:  Can do most things most days, with some rest    Work:  Full time  Adverse effects:  No  Adherance    Taking medication as directed?  Yes    Participating in other treatments: None  Risk Factors:    Sleep:  Fair    Mood/anxiety:  controlled    Recent family or social stressors:  none noted    Other aggravating factors: none  PHQ-9 SCORE 3/14/2016 9/20/2016 3/8/2017   Total Score - - -   Total Score 2 3 7     MIKE-7 SCORE 3/14/2016 9/20/2016 3/8/2017   Total Score - - -   Total Score 1 1 5     Encounter-Level CSA - 08/10/2015:          Controlled Substance Agreement - Scan on 8/12/2015  1:06 PM : [facility] CONTROLLED SUBSTANCE AGREEMENT (below)                      Amount of exercise or physical activity: None    Problems taking medications regularly: does forget, misses a dose a day, at least (past couple months)    Medication side effects: none  Diet: regular (no restrictions)    SUBJECTIVE:  Here today in follow-up of diabetes, hypertension, chronic knee pain. Reviewed lab work from today. A1c isn't any better. The patient again admits that he has trouble taking his medication for most days he will given 1 dose per rarely 2. We discussed injectable therapy as a possibility but is not interested in potential side  "effects she read about GLP-1 inhibitors.    Knees still an ongoing chronic issue but he has been walking more. Gets 10,000 steps and most days. No side effects from the medications.    Review of systems otherwise negative.  Past medical, family, and social history reviewed and updated in chart.    OBJECTIVE:  /84 (BP Location: Right arm, Patient Position: Right side, Cuff Size: Adult Regular)  Pulse 86  Temp 98.1  F (36.7  C) (Oral)  Resp 16  Ht 5' 6\" (1.676 m)  Wt 240 lb 3.2 oz (109 kg)  SpO2 100%  BMI 38.77 kg/m2  Alert, pleasant, upbeat, and in no apparent discomfort.   overweight   S1 and S2 normal, no murmurs, clicks, gallops or rubs. Regular rate and rhythm. Chest is clear; no wheezes or rales. No edema or JVD.  Past labs reviewed with the patient.     ASSESSMENT / PLAN:  (E11.65) Type 2 diabetes mellitus with hyperglycemia, without long-term current use of insulin (H)  (primary encounter diagnosis)  Comment: I really don't have much to tell him at this point.  If he is not going to take his medication really isn't anything to offer. We discussed streamlining the process into more once daily dosages. He says he will try to get better and if not improved in 3 months we can do whatever I think. At this point I think I would add Lantus  Plan:     (E66.9) Obesity, Class II, BMI 35-39.9, with comorbidity  Comment: lifestyle improvement   Plan:     (I10) Essential hypertension with goal blood pressure less than 130/80  Comment: well controlled on current dosage   Plan:     (M25.561,  G89.29) Chronic pain of right knee  Comment: stable - refilled x 3.  UDS today   Plan: oxyCODONE-acetaminophen (PERCOCET) 7.5-325 MG         per tablet, DISCONTINUED:         oxyCODONE-acetaminophen (PERCOCET) 7.5-325 MG         per tablet, DISCONTINUED:         oxyCODONE-acetaminophen (PERCOCET) 7.5-325 MG         per tablet            Follow up 3 months   STEW Terry MD    (Chart documentation completed in part " with Dragon voice-recognition software.  Even though reviewed some grammatical, spelling, and word errors may remain.)

## 2017-09-12 LAB — COMPREHEN DRUG ANALYSIS UR: NORMAL

## 2017-09-15 NOTE — PROGRESS NOTES
Xavier,    The other diabetes lab tests looked great - kidneys, liver, and cholesterol all normal.    I have reviewed the results of your recent drug screen and noted that it was positive for marijuana.  In response to recommendations from the federal government [place] now has a policy in place to routinely screen patients on chronic pain medicine to ensure that they are taking the medication as prescribed and not combining it with other potentially dangerous medications.    Despite recent legalization of the use of medical marijuana in certain situations, the recreational use of marijuana is still illegal in the [facility].  Such use is in direct conflict with your signed narcotics contract.  I will now require routine screening of the situation and if further violations are found I will no longer be able to prescribe pain medication or other controlled substances.  And if you feel you have developed an issue with the use of marijuana and would like some assistance with this, please let me know.    STEW Terry M.D.

## 2017-12-04 ENCOUNTER — OFFICE VISIT (OUTPATIENT)
Dept: FAMILY MEDICINE | Facility: CLINIC | Age: 47
End: 2017-12-04
Payer: COMMERCIAL

## 2017-12-04 VITALS
TEMPERATURE: 98.5 F | WEIGHT: 248.6 LBS | SYSTOLIC BLOOD PRESSURE: 132 MMHG | DIASTOLIC BLOOD PRESSURE: 84 MMHG | OXYGEN SATURATION: 96 % | HEART RATE: 70 BPM | HEIGHT: 66 IN | BODY MASS INDEX: 39.95 KG/M2 | RESPIRATION RATE: 16 BRPM

## 2017-12-04 DIAGNOSIS — G89.29 CHRONIC PAIN OF RIGHT KNEE: ICD-10-CM

## 2017-12-04 DIAGNOSIS — I10 ESSENTIAL HYPERTENSION WITH GOAL BLOOD PRESSURE LESS THAN 130/80: ICD-10-CM

## 2017-12-04 DIAGNOSIS — E11.65 TYPE 2 DIABETES MELLITUS WITH HYPERGLYCEMIA, WITHOUT LONG-TERM CURRENT USE OF INSULIN (H): Primary | ICD-10-CM

## 2017-12-04 DIAGNOSIS — E66.01 MORBID OBESITY WITH BMI OF 40.0-44.9, ADULT (H): ICD-10-CM

## 2017-12-04 DIAGNOSIS — M25.561 CHRONIC PAIN OF RIGHT KNEE: ICD-10-CM

## 2017-12-04 LAB — HBA1C MFR BLD: 10.2 % (ref 4.3–6)

## 2017-12-04 PROCEDURE — 83036 HEMOGLOBIN GLYCOSYLATED A1C: CPT | Performed by: FAMILY MEDICINE

## 2017-12-04 PROCEDURE — 99214 OFFICE O/P EST MOD 30 MIN: CPT | Performed by: FAMILY MEDICINE

## 2017-12-04 PROCEDURE — 36415 COLL VENOUS BLD VENIPUNCTURE: CPT | Performed by: FAMILY MEDICINE

## 2017-12-04 RX ORDER — AMLODIPINE BESYLATE 10 MG/1
10 TABLET ORAL DAILY
Qty: 90 TABLET | Refills: 1 | Status: SHIPPED | OUTPATIENT
Start: 2017-12-04 | End: 2018-06-06

## 2017-12-04 RX ORDER — CHLORTHALIDONE 25 MG/1
25 TABLET ORAL DAILY
Qty: 90 TABLET | Refills: 1 | Status: SHIPPED | OUTPATIENT
Start: 2017-12-04 | End: 2018-03-26

## 2017-12-04 RX ORDER — OXYCODONE AND ACETAMINOPHEN 7.5; 325 MG/1; MG/1
1-2 TABLET ORAL 3 TIMES DAILY PRN
Qty: 150 TABLET | Refills: 0 | Status: SHIPPED | OUTPATIENT
Start: 2018-01-05 | End: 2017-12-04

## 2017-12-04 RX ORDER — OXYCODONE AND ACETAMINOPHEN 7.5; 325 MG/1; MG/1
1-2 TABLET ORAL 3 TIMES DAILY PRN
Qty: 150 TABLET | Refills: 0 | Status: SHIPPED | OUTPATIENT
Start: 2017-12-06 | End: 2017-12-04

## 2017-12-04 RX ORDER — OXYCODONE AND ACETAMINOPHEN 7.5; 325 MG/1; MG/1
1-2 TABLET ORAL 3 TIMES DAILY PRN
Qty: 150 TABLET | Refills: 0 | Status: SHIPPED | OUTPATIENT
Start: 2018-02-05 | End: 2018-02-28

## 2017-12-04 RX ORDER — LOSARTAN POTASSIUM 50 MG/1
50 TABLET ORAL DAILY
Qty: 90 TABLET | Refills: 1 | Status: SHIPPED | OUTPATIENT
Start: 2017-12-04 | End: 2018-06-06

## 2017-12-04 RX ORDER — PIOGLITAZONEHYDROCHLORIDE 45 MG/1
45 TABLET ORAL DAILY
Qty: 90 TABLET | Refills: 1 | Status: SHIPPED | OUTPATIENT
Start: 2017-12-04 | End: 2018-06-06

## 2017-12-04 RX ORDER — GLIMEPIRIDE 4 MG/1
4 TABLET ORAL 2 TIMES DAILY
Qty: 180 TABLET | Refills: 1 | Status: SHIPPED | OUTPATIENT
Start: 2017-12-04 | End: 2018-06-06

## 2017-12-04 ASSESSMENT — PATIENT HEALTH QUESTIONNAIRE - PHQ9
SUM OF ALL RESPONSES TO PHQ QUESTIONS 1-9: 2
5. POOR APPETITE OR OVEREATING: NOT AT ALL

## 2017-12-04 ASSESSMENT — ANXIETY QUESTIONNAIRES
1. FEELING NERVOUS, ANXIOUS, OR ON EDGE: NOT AT ALL
2. NOT BEING ABLE TO STOP OR CONTROL WORRYING: NOT AT ALL
3. WORRYING TOO MUCH ABOUT DIFFERENT THINGS: NOT AT ALL
GAD7 TOTAL SCORE: 0
IF YOU CHECKED OFF ANY PROBLEMS ON THIS QUESTIONNAIRE, HOW DIFFICULT HAVE THESE PROBLEMS MADE IT FOR YOU TO DO YOUR WORK, TAKE CARE OF THINGS AT HOME, OR GET ALONG WITH OTHER PEOPLE: NOT DIFFICULT AT ALL
7. FEELING AFRAID AS IF SOMETHING AWFUL MIGHT HAPPEN: NOT AT ALL
5. BEING SO RESTLESS THAT IT IS HARD TO SIT STILL: NOT AT ALL
6. BECOMING EASILY ANNOYED OR IRRITABLE: NOT AT ALL

## 2017-12-04 ASSESSMENT — PAIN SCALES - GENERAL: PAINLEVEL: MODERATE PAIN (4)

## 2017-12-04 NOTE — MR AVS SNAPSHOT
After Visit Summary   12/4/2017    Xavier Mcguire    MRN: [id number]           Patient Information     Date Of Birth          1970        Visit Information        Provider Department      12/4/2017 9:20 AM Lizett Terry MD [facility]        Today's Diagnoses     Type 2 diabetes mellitus with hyperglycemia, without long-term current use of insulin (H)    -  1    Morbid obesity with BMI of 40.0-44.9, adult (H)        Essential hypertension with goal blood pressure less than 130/80        Chronic pain of right knee           Follow-ups after your visit        Follow-up notes from your care team     Return in about 6 months (around 6/4/2018).      Who to contact     If you have questions or need follow up information about today's clinic visit or your schedule please contact [facility] directly at [phone].  Normal or non-critical lab and imaging results will be communicated to you by MyChart, letter or phone within 4 business days after the clinic has received the results. If you do not hear from us within 7 days, please contact the clinic through [facility] or phone. If you have a critical or abnormal lab result, we will notify you by phone as soon as possible.  Submit refill requests through [facility] or call your pharmacy and they will forward the refill request to us. Please allow 3 business days for your refill to be completed.          Additional Information About Your Visit        MyChart Information     [facility] gives you secure access to your electronic health record. If you see a primary care provider, you can also send messages to your care team and make appointments. If you have questions, please call your primary care clinic.  If you do not have a primary care provider, please call [phone] and they will assist you.        Care EveryWhere ID     This is your Care EveryWhere ID. This could be used by other organizations to access your  "[facility] medical records  [id number]        Your Vitals Were     Pulse Temperature Respirations Height Pulse Oximetry BMI (Body Mass Index)    70 98.5  F (36.9  C) (Oral) 16 1.676 m (5' 6\") 96% 40.13 kg/m2       Blood Pressure from Last 3 Encounters:   12/04/17 132/84   09/06/17 136/84   06/02/17 138/70    Weight from Last 3 Encounters:   12/04/17 112.8 kg (248 lb 9.6 oz)   09/06/17 109 kg (240 lb 3.2 oz)   06/02/17 107.9 kg (237 lb 14.4 oz)              We Performed the Following     Hemoglobin A1c          Today's Medication Changes          These changes are accurate as of: 12/4/17  9:57 AM.  If you have any questions, ask your nurse or doctor.               Start taking these medicines.        Dose/Directions    oxyCODONE-acetaminophen 7.5-325 MG per tablet   Commonly known as:  PERCOCET   Used for:  Chronic pain of right knee   Started by:  Lizett Terry MD        Dose:  1-2 tablet   Start taking on:  2/5/2018   Take 1-2 tablets by mouth 3 times daily as needed for pain   Quantity:  150 tablet   Refills:  0            Where to get your medicines      These medications were sent to [facility] [address] [place] [address] [address] [place] [address]  [address] [facility] [address]    Hours:  24-hours Phone:  [phone]     amLODIPine 10 MG tablet    chlorthalidone 25 MG tablet    glimepiride 4 MG tablet    losartan 50 MG tablet    metFORMIN 1000 MG tablet    pioglitazone 45 MG tablet         Some of these will need a paper prescription and others can be bought over the counter.  Ask your nurse if you have questions.     Bring a paper prescription for each of these medications     oxyCODONE-acetaminophen 7.5-325 MG per tablet                Primary Care Provider Office Phone # Fax #    Lizett Terry [phone] [address] [facility] [address]        Equal Access to Services     BALDEMAR SORIA AH: Jazz lopez " lorraine Zamora, wavianeyda luqadaha, qaybta kaalmagaly doss, jacobo hermanjordi curt. So [facility] [phone].    ATENCIÓN: Si mary gracela deyvi, tiene a hunt disposición servicios gratuitos de asistencia lingüística. Sherman al [phone].    We comply with applicable federal civil rights laws and [place] laws. We do not discriminate on the basis of race, color, national origin, age, disability, sex, sexual orientation, or gender identity.            Thank you!     Thank you for choosing [facility]  for your care. Our goal is always to provide you with excellent care. Hearing back from our patients is one way we can continue to improve our services. Please take a few minutes to complete the written survey that you may receive in the mail after your visit with us. Thank you!             Your Updated Medication List - Protect others around you: Learn how to safely use, store and throw away your medicines at www.disposemymeds.org.          This list is accurate as of: 12/4/17  9:57 AM.  Always use your most recent med list.                   Brand Name Dispense Instructions for use Diagnosis    ACE NOT PRESCRIBED (INTENTIONAL)      continuous prn for other ACE Inhibitor not prescribed due to Intolerance        albuterol 108 (90 BASE) MCG/ACT Inhaler    PROAIR HFA/PROVENTIL HFA/VENTOLIN HFA    1 Inhaler    Inhale 2 puffs into the lungs every 4 hours as needed for shortness of breath / dyspnea or wheezing    Acute bronchitis, unspecified organism       ALPRAZolam 0.5 MG tablet    XANAX    30 tablet    1/2 to 1 tab three times daily as needed    Situational anxiety       amLODIPine 10 MG tablet    NORVASC    90 tablet    Take 1 tablet (10 mg) by mouth daily    Essential hypertension with goal blood pressure less than 130/80       aspirin 81 MG tablet     100 tablet    Take 1 tablet by mouth daily.    Type 2 diabetes, HbA1c goal < 7% (H)       chlorthalidone 25 MG tablet    HYGROTON    90  tablet    Take 1 tablet (25 mg) by mouth daily    Essential hypertension with goal blood pressure less than 130/80       fexofenadine 180 MG tablet    ALLEGRA    90 tablet    TAKE 1 TABLET BY MOUTH DAILY    Allergic rhinitis, cause unspecified       glimepiride 4 MG tablet    AMARYL    180 tablet    Take 1 tablet (4 mg) by mouth 2 times daily    Type 2 diabetes mellitus with hyperglycemia, without long-term current use of insulin (H)       ipratropium - albuterol 0.5 mg/2.5 mg/3 mL 0.5-2.5 (3) MG/3ML neb solution    DUONEB    90 vial    Take 1 vial (3 mLs) by nebulization every 6 hours as needed for shortness of breath / dyspnea or wheezing    Acute bronchitis, unspecified organism       losartan 50 MG tablet    COZAAR    90 tablet    Take 1 tablet (50 mg) by mouth daily    Essential hypertension with goal blood pressure less than 130/80       metFORMIN 1000 MG tablet    GLUCOPHAGE    180 tablet    Take 1 tablet (1,000 mg) by mouth 2 times daily (with meals) with breakfast and dinner.    Type 2 diabetes mellitus with hyperglycemia, without long-term current use of insulin (H)       ONE TOUCH DELICA LANCETS Misc     200 each    1 Device 2 times daily    Type 2 diabetes, HbA1c goal < 7% (H)       * order for DME     1 Device    Equipment being ordered: Nebulizer    Acute bronchitis, unspecified organism       * order for DME     1 Device    Equipment being ordered: Glucometer, per insurance plan Tests daily    Type 2 diabetes mellitus with hyperglycemia, without long-term current use of insulin (H)       * order for DME     100 Units    Equipment being ordered: Test strips and lancets - per device and insurance plan. Tests daily. Disp: 100 Refill: 3    Type 2 diabetes mellitus with hyperglycemia, without long-term current use of insulin (H)       oxyCODONE-acetaminophen 7.5-325 MG per tablet   Start taking on:  2/5/2018    PERCOCET    150 tablet    Take 1-2 tablets by mouth 3 times daily as needed for pain    Chronic  pain of right knee       pioglitazone 45 MG tablet    ACTOS    90 tablet    Take 1 tablet (45 mg) by mouth daily    Type 2 diabetes mellitus with hyperglycemia, without long-term current use of insulin (H)       STATIN NOT PRESCRIBED (INTENTIONAL)      continuous prn for other Statin not prescribed intentionally due to Other patient declines (This option does not exclude patient from measure)        STATIN NOT PRESCRIBED (INTENTIONAL)     0 each    1 each At Bedtime Statin not prescribed intentionally due to Other: not needed    Type 2 diabetes, HbA1c goal < 7% (H)       tadalafil 20 MG tablet    CIALIS    10 tablet    Take 1 tablet (20 mg) by mouth daily as needed for erectile dysfunction    Erectile dysfunction, unspecified erectile dysfunction type       * Notice:  This list has 3 medication(s) that are the same as other medications prescribed for you. Read the directions carefully, and ask your doctor or other care provider to review them with you.

## 2017-12-04 NOTE — NURSING NOTE
"Chief Complaint   Patient presents with     Recheck Medication       Initial /84 (BP Location: Right arm, Patient Position: Right side, Cuff Size: Adult Large)  Pulse 70  Temp 98.5  F (36.9  C) (Oral)  Resp 16  Ht 1.676 m (5' 6\")  Wt 112.8 kg (248 lb 9.6 oz)  SpO2 96%  BMI 40.13 kg/m2 Estimated body mass index is 40.13 kg/(m^2) as calculated from the following:    Height as of this encounter: 1.676 m (5' 6\").    Weight as of this encounter: 112.8 kg (248 lb 9.6 oz).  Medication Reconciliation: complete     Will Vilma RED      "

## 2017-12-05 ASSESSMENT — ANXIETY QUESTIONNAIRES: GAD7 TOTAL SCORE: 0

## 2017-12-05 NOTE — PROGRESS NOTES
Xavier,  Not really any better.  You have got to take your medication and work on diet and exercise.  I cannot do it for you.  STEW Terry M.D.

## 2018-01-04 DIAGNOSIS — I10 ESSENTIAL HYPERTENSION WITH GOAL BLOOD PRESSURE LESS THAN 130/80: ICD-10-CM

## 2018-01-04 NOTE — TELEPHONE ENCOUNTER
chlorthalidone (HYGROTON) 25 MG tablet      Last Written Prescription Date: 12/4/17  Last Fill Quantity: 90, # refills: 1  Last Office Visit with G, [facility] or [facility] prescribing provider: 12/4/17       Potassium   Date Value Ref Range Status   09/06/2017 3.9 3.4 - 5.3 mmol/L Final     Creatinine   Date Value Ref Range Status   09/06/2017 0.94 0.66 - 1.25 mg/dL Final     BP Readings from Last 3 Encounters:   12/04/17 132/84   09/06/17 136/84   06/02/17 138/70

## 2018-01-09 RX ORDER — CHLORTHALIDONE 25 MG/1
25 TABLET ORAL DAILY
Qty: 90 TABLET | Refills: 1
Start: 2018-01-09

## 2018-01-09 NOTE — TELEPHONE ENCOUNTER
"Requested Prescriptions   Pending Prescriptions Disp Refills     chlorthalidone (HYGROTON) 25 MG tablet 90 tablet 1     Sig: Take 1 tablet (25 mg) by mouth daily    Diuretics (Including Combos) Protocol Passed    1/4/2018  4:55 PM       Passed - Blood pressure under 140/90    BP Readings from Last 3 Encounters:   12/04/17 132/84   09/06/17 136/84   06/02/17 138/70                Passed - Recent or future visit with authorizing provider's specialty    Patient had office visit in the last year or has a visit in the next 30 days with authorizing provider.  See \"Patient Info\" tab in inbasket, or \"Choose Columns\" in Meds & Orders section of the refill encounter.     Last OV: 12/4/17         Passed - Patient is age 18 or older       Passed - Normal serum creatinine on file in past 12 months    Recent Labs   Lab Test  09/06/17   0948   CR  0.94             Passed - Normal serum potassium on file in past 12 months    Recent Labs   Lab Test  09/06/17   0948   POTASSIUM  3.9                   Passed - Normal serum sodium on file in past 12 months    Recent Labs   Lab Test  09/06/17   0948   NA  139                Patient was given a 90 day supply on 12/4/17 with one refill. Refill denied and pharmacy notified.     Lida Olsen RN, BSN       "

## 2018-02-28 ENCOUNTER — OFFICE VISIT (OUTPATIENT)
Dept: FAMILY MEDICINE | Facility: CLINIC | Age: 48
End: 2018-02-28
Payer: COMMERCIAL

## 2018-02-28 VITALS
TEMPERATURE: 98 F | WEIGHT: 243.6 LBS | HEIGHT: 65 IN | OXYGEN SATURATION: 98 % | DIASTOLIC BLOOD PRESSURE: 86 MMHG | BODY MASS INDEX: 40.59 KG/M2 | RESPIRATION RATE: 20 BRPM | SYSTOLIC BLOOD PRESSURE: 140 MMHG | HEART RATE: 77 BPM

## 2018-02-28 DIAGNOSIS — M25.561 CHRONIC PAIN OF RIGHT KNEE: Primary | ICD-10-CM

## 2018-02-28 DIAGNOSIS — E66.01 MORBID OBESITY WITH BMI OF 40.0-44.9, ADULT (H): ICD-10-CM

## 2018-02-28 DIAGNOSIS — G89.29 CHRONIC PAIN OF RIGHT KNEE: Primary | ICD-10-CM

## 2018-02-28 DIAGNOSIS — E11.65 TYPE 2 DIABETES MELLITUS WITH HYPERGLYCEMIA, WITHOUT LONG-TERM CURRENT USE OF INSULIN (H): ICD-10-CM

## 2018-02-28 PROCEDURE — 99214 OFFICE O/P EST MOD 30 MIN: CPT | Performed by: FAMILY MEDICINE

## 2018-02-28 PROCEDURE — 99207 C FOOT EXAM  NO CHARGE: CPT | Performed by: FAMILY MEDICINE

## 2018-02-28 RX ORDER — OXYCODONE AND ACETAMINOPHEN 7.5; 325 MG/1; MG/1
1-2 TABLET ORAL 3 TIMES DAILY PRN
Qty: 150 TABLET | Refills: 0 | Status: SHIPPED | OUTPATIENT
Start: 2018-02-28 | End: 2018-02-28

## 2018-02-28 RX ORDER — OXYCODONE AND ACETAMINOPHEN 7.5; 325 MG/1; MG/1
1-2 TABLET ORAL 3 TIMES DAILY PRN
Qty: 150 TABLET | Refills: 0 | Status: SHIPPED | OUTPATIENT
Start: 2018-04-28 | End: 2018-05-27

## 2018-02-28 RX ORDER — OXYCODONE AND ACETAMINOPHEN 7.5; 325 MG/1; MG/1
1-2 TABLET ORAL 3 TIMES DAILY PRN
Qty: 150 TABLET | Refills: 0 | Status: SHIPPED | OUTPATIENT
Start: 2018-03-28 | End: 2018-02-28

## 2018-02-28 NOTE — NURSING NOTE
"Chief Complaint   Patient presents with     Diabetes       Initial /86 (BP Location: Right arm, Patient Position: Sitting, Cuff Size: Adult Large)  Pulse 77  Temp 98  F (36.7  C) (Oral)  Resp 20  Ht 1.651 m (5' 5\")  Wt 110.5 kg (243 lb 9.6 oz)  SpO2 98%  BMI 40.54 kg/m2 Estimated body mass index is 40.54 kg/(m^2) as calculated from the following:    Height as of this encounter: 1.651 m (5' 5\").    Weight as of this encounter: 110.5 kg (243 lb 9.6 oz).  Medication Reconciliation: kristan Banks        "

## 2018-02-28 NOTE — LETTER
My Depression Action Plan  Name: Xavier Mcguire   Date of Birth 1970  Date: 2/28/2018    My doctor: Lizett Terry   My clinic: [address] [address] [phone]          GREEN    ZONE   Good Control    What it looks like:     Things are going generally well. You have normal up s and down s. You may even feel depressed from time to time, but bad moods usually last less than a day.   What you need to do:  1. Continue to care for yourself (see self care plan)  2. Check your depression survival kit and update it as needed  3. Follow your physician s recommendations including any medication.  4. Do not stop taking medication unless you consult with your physician first.           YELLOW         ZONE Getting Worse    What it looks like:     Depression is starting to interfere with your life.     It may be hard to get out of bed; you may be starting to isolate yourself from others.    Symptoms of depression are starting to last most all day and this has happened for several days.     You may have suicidal thoughts but they are not constant.   What you need to do:     1. Call your care team, your response to treatment will improve if you keep your care team informed of your progress. Yellow periods are signs an adjustment may need to be made.     2. Continue your self-care, even if you have to fake it!    3. Talk to someone in your support network    4. Open up your depression survival kit           RED    ZONE Medical Alert - Get Help    What it looks like:     Depression is seriously interfering with your life.     You may experience these or other symptoms: You can t get out of bed most days, can t work or engage in other necessary activities, you have trouble taking care of basic hygiene, or basic responsibilities, thoughts of suicide or death that will not go away, self-injurious behavior.     What you need to do:  1. Call  your care team and request a same-day appointment. If they are not available (weekends or after hours) call your local crisis line, emergency room or 911.      Electronically signed by: Taisha Banks, February 28, 2018    Depression Self Care Plan / Survival Kit    Self-Care for Depression  Here s the deal. Your body and mind are really not as separate as most people think.  What you do and think affects how you feel and how you feel influences what you do and think. This means if you do things that people who feel good do, it will help you feel better.  Sometimes this is all it takes.  There is also a place for medication and therapy depending on how severe your depression is, so be sure to consult with your medical provider and/ or Behavioral Health Consultant if your symptoms are worsening or not improving.     In order to better manage my stress, I will:    Exercise  Get some form of exercise, every day. This will help reduce pain and release endorphins, the  feel good  chemicals in your brain. This is almost as good as taking antidepressants!  This is not the same as joining a gym and then never going! (they count on that by the way ) It can be as simple as just going for a walk or doing some gardening, anything that will get you moving.      Hygiene   Maintain good hygiene (Get out of bed in the morning, Make your bed, Brush your teeth, Take a shower, and Get dressed like you were going to work, even if you are unemployed).  If your clothes don't fit try to get ones that do.    Diet  I will strive to eat foods that are good for me, drink plenty of water, and avoid excessive sugar, caffeine, alcohol, and other mood-altering substances.  Some foods that are helpful in depression are: complex carbohydrates, B vitamins, flaxseed, fish or fish oil, fresh fruits and vegetables.    Psychotherapy  I agree to participate in Individual Therapy (if recommended).    Medication  If prescribed medications, I agree to  take them.  Missing doses can result in serious side effects.  I understand that drinking alcohol, or other illicit drug use, may cause potential side effects.  I will not stop my medication abruptly without first discussing it with my provider.    Staying Connected With Others  I will stay in touch with my friends, family members, and my primary care provider/team.    Use your imagination  Be creative.  We all have a creative side; it doesn t matter if it s oil painting, sand castles, or mud pies! This will also kick up the endorphins.    Witness Beauty  (AKA stop and smell the roses) Take a look outside, even in mid-winter. Notice colors, textures. Watch the squirrels and birds.     Service to others  Be of service to others.  There is always someone else in need.  By helping others we can  get out of ourselves  and remember the really important things.  This also provides opportunities for practicing all the other parts of the program.    Humor  Laugh and be silly!  Adjust your TV habits for less news and crime-drama and more comedy.    Control your stress  Try breathing deep, massage therapy, biofeedback, and meditation. Find time to relax each day.     My support system    Clinic Contact:  Phone number:    Contact 1:  Phone number:    Contact 2:  Phone number:    Jew/:  Phone number:    Therapist:  Phone number:    Local crisis center:    Phone number:    Other community support:  Phone number:

## 2018-02-28 NOTE — MR AVS SNAPSHOT
After Visit Summary   2/28/2018    Xavier Mcguire    MRN: [id number]           Patient Information     Date Of Birth          1970        Visit Information        Provider Department      2/28/2018 8:20 AM Lizett Terry MD [facility]        Today's Diagnoses     Chronic pain of right knee    -  1    Type 2 diabetes mellitus with hyperglycemia, without long-term current use of insulin (H)           Follow-ups after your visit        Additional Services     ORTHO  REFERRAL       [facility] is referring you to the Orthopedic  Services at [facility].       The  Representative will assist you in the coordination of your Orthopedic and Musculoskeletal Care as prescribed by your physician.    The  Representative will call you within 1 business day to help schedule your appointment, or you may contact the  Representative at:    All areas ~ [phone]     Type of Referral : Non Surgical       Timeframe requested: Routine    Coverage of these services is subject to the terms and limitations of your health insurance plan.  Please call member services at your health plan with any benefit or coverage questions.      If X-rays, CT or MRI's have been performed, please contact the facility where they were done to arrange for , prior to your scheduled appointment.  Please bring this referral request to your appointment and present it to your specialist.                  Follow-up notes from your care team     Return in about 3 months (around 5/28/2018).      Who to contact     If you have questions or need follow up information about today's clinic visit or your schedule please contact [facility] directly at [phone].  Normal or non-critical lab and imaging results will be communicated to you by MyChart, letter or phone within 4 business days after the clinic has  "received the results. If you do not hear from us within 7 days, please contact the clinic through [employer] or phone. If you have a critical or abnormal lab result, we will notify you by phone as soon as possible.  Submit refill requests through [employer] or call your pharmacy and they will forward the refill request to us. Please allow 3 business days for your refill to be completed.          Additional Information About Your Visit        [employer] Information     [employer] gives you secure access to your electronic health record. If you see a primary care provider, you can also send messages to your care team and make appointments. If you have questions, please call your primary care clinic.  If you do not have a primary care provider, please call [phone] and they will assist you.        Care EveryWhere ID     This is your Care EveryWhere ID. This could be used by other organizations to access your [place] medical records  [id number]        Your Vitals Were     Pulse Temperature Respirations Height Pulse Oximetry BMI (Body Mass Index)    77 98  F (36.7  C) (Oral) 20 1.651 m (5' 5\") 98% 40.54 kg/m2       Blood Pressure from Last 3 Encounters:   02/28/18 140/86   12/04/17 132/84   09/06/17 136/84    Weight from Last 3 Encounters:   02/28/18 110.5 kg (243 lb 9.6 oz)   12/04/17 112.8 kg (248 lb 9.6 oz)   09/06/17 109 kg (240 lb 3.2 oz)              We Performed the Following     FOOT EXAM  NO CHARGE [55750.114]     ORTHO  REFERRAL          Today's Medication Changes          These changes are accurate as of 2/28/18  9:11 AM.  If you have any questions, ask your nurse or doctor.               Start taking these medicines.        Dose/Directions    oxyCODONE-acetaminophen 7.5-325 MG per tablet   Commonly known as:  PERCOCET   Used for:  Chronic pain of right knee   Started by:  Lizett Terry MD        Dose:  1-2 tablet   Start taking on:  4/28/2018   Take 1-2 tablets by mouth 3 times daily as needed " for pain   Quantity:  150 tablet   Refills:  0         These medicines have changed or have updated prescriptions.        Dose/Directions    * order for DME   This may have changed:  Another medication with the same name was added. Make sure you understand how and when to take each.   Used for:  Acute bronchitis, unspecified organism   Changed by:  Lizett Terry MD        Equipment being ordered: Nebulizer   Quantity:  1 Device   Refills:  1       * order for DME   This may have changed:  Another medication with the same name was added. Make sure you understand how and when to take each.   Used for:  Type 2 diabetes mellitus with hyperglycemia, without long-term current use of insulin (H)   Changed by:  Lizett Terry MD        Equipment being ordered: Glucometer, per insurance plan Tests daily   Quantity:  1 Device   Refills:  0       * order for DME   This may have changed:  Another medication with the same name was added. Make sure you understand how and when to take each.   Used for:  Type 2 diabetes mellitus with hyperglycemia, without long-term current use of insulin (H)   Changed by:  Lizett Terry MD        Equipment being ordered: Test strips and lancets - per device and insurance plan. Tests daily. Disp: 100 Refill: 3   Quantity:  100 Units   Refills:  3       * order for DME   This may have changed:  You were already taking a medication with the same name, and this prescription was added. Make sure you understand how and when to take each.   Used for:  Type 2 diabetes mellitus with hyperglycemia, without long-term current use of insulin (H)   Changed by:  Lizett Terry MD        Equipment being ordered: glucometer - test daily   Quantity:  1 Device   Refills:  0       * Notice:  This list has 4 medication(s) that are the same as other medications prescribed for you. Read the directions carefully, and ask your doctor or other care provider to review them with you.          Where to get your medicines      Some of these will need a paper prescription and others can be bought over the counter.  Ask your nurse if you have questions.     Bring a paper prescription for each of these medications     order for DME    oxyCODONE-acetaminophen 7.5-325 MG per tablet                Primary Care Provider Office Phone # Fax #    Lizett Pa Terry [phone] [address] [facility] [address]        Equal Access to Services     BALDEMAR SORIA : Hadii aad ku hadasho Soomaali, waaxda luqadaha, qaybta kaalmada adeegyada, waxay idiin hayaan adeeg kharamarjan la'fernando . So [facility] [phone].    ATENCIÓN: Si habla español, tiene a hunt disposición servicios gratuitos de asistencia lingüística. [facility] [phone].    We comply with applicable federal civil rights laws and [place] laws. We do not discriminate on the basis of race, color, national origin, age, disability, sex, sexual orientation, or gender identity.            Thank you!     Thank you for choosing [facility]  for your care. Our goal is always to provide you with excellent care. Hearing back from our patients is one way we can continue to improve our services. Please take a few minutes to complete the written survey that you may receive in the mail after your visit with us. Thank you!             Your Updated Medication List - Protect others around you: Learn how to safely use, store and throw away your medicines at www.disposemymeds.org.          This list is accurate as of 2/28/18  9:11 AM.  Always use your most recent med list.                   Brand Name Dispense Instructions for use Diagnosis    ACE NOT PRESCRIBED (INTENTIONAL)      continuous prn for other ACE Inhibitor not prescribed due to Intolerance        albuterol 108 (90 BASE) MCG/ACT Inhaler    PROAIR HFA/PROVENTIL HFA/VENTOLIN HFA    1 Inhaler    Inhale 2 puffs into the lungs every 4 hours as needed for shortness of breath  / dyspnea or wheezing    Acute bronchitis, unspecified organism       ALPRAZolam 0.5 MG tablet    XANAX    30 tablet    1/2 to 1 tab three times daily as needed    Situational anxiety       amLODIPine 10 MG tablet    NORVASC    90 tablet    Take 1 tablet (10 mg) by mouth daily    Essential hypertension with goal blood pressure less than 130/80       aspirin 81 MG tablet     100 tablet    Take 1 tablet by mouth daily.    Type 2 diabetes, HbA1c goal < 7% (H)       chlorthalidone 25 MG tablet    HYGROTON    90 tablet    Take 1 tablet (25 mg) by mouth daily    Essential hypertension with goal blood pressure less than 130/80       fexofenadine 180 MG tablet    ALLEGRA    90 tablet    TAKE 1 TABLET BY MOUTH DAILY    Allergic rhinitis, cause unspecified       glimepiride 4 MG tablet    AMARYL    180 tablet    Take 1 tablet (4 mg) by mouth 2 times daily    Type 2 diabetes mellitus with hyperglycemia, without long-term current use of insulin (H)       ipratropium - albuterol 0.5 mg/2.5 mg/3 mL 0.5-2.5 (3) MG/3ML neb solution    DUONEB    90 vial    Take 1 vial (3 mLs) by nebulization every 6 hours as needed for shortness of breath / dyspnea or wheezing    Acute bronchitis, unspecified organism       losartan 50 MG tablet    COZAAR    90 tablet    Take 1 tablet (50 mg) by mouth daily    Essential hypertension with goal blood pressure less than 130/80       metFORMIN 1000 MG tablet    GLUCOPHAGE    180 tablet    Take 1 tablet (1,000 mg) by mouth 2 times daily (with meals) with breakfast and dinner.    Type 2 diabetes mellitus with hyperglycemia, without long-term current use of insulin (H)       ONE TOUCH DELICA LANCETS Misc     200 each    1 Device 2 times daily    Type 2 diabetes, HbA1c goal < 7% (H)       * order for DME     1 Device    Equipment being ordered: Nebulizer    Acute bronchitis, unspecified organism       * order for DME     1 Device    Equipment being ordered: Glucometer, per insurance plan Tests daily    Type  2 diabetes mellitus with hyperglycemia, without long-term current use of insulin (H)       * order for DME     100 Units    Equipment being ordered: Test strips and lancets - per device and insurance plan. Tests daily. Disp: 100 Refill: 3    Type 2 diabetes mellitus with hyperglycemia, without long-term current use of insulin (H)       * order for DME     1 Device    Equipment being ordered: glucometer - test daily    Type 2 diabetes mellitus with hyperglycemia, without long-term current use of insulin (H)       oxyCODONE-acetaminophen 7.5-325 MG per tablet   Start taking on:  4/28/2018    PERCOCET    150 tablet    Take 1-2 tablets by mouth 3 times daily as needed for pain    Chronic pain of right knee       pioglitazone 45 MG tablet    ACTOS    90 tablet    Take 1 tablet (45 mg) by mouth daily    Type 2 diabetes mellitus with hyperglycemia, without long-term current use of insulin (H)       tadalafil 20 MG tablet    CIALIS    10 tablet    Take 1 tablet (20 mg) by mouth daily as needed for erectile dysfunction    Erectile dysfunction, unspecified erectile dysfunction type       * Notice:  This list has 4 medication(s) that are the same as other medications prescribed for you. Read the directions carefully, and ask your doctor or other care provider to review them with you.

## 2018-02-28 NOTE — PROGRESS NOTES
SUBJECTIVE:   Xavier Mcguire is a 47 year old male who presents to clinic today for the following health issues:      Diabetes Follow-up      Patient is checking blood sugars: not at all    Diabetic concerns: None     Symptoms of hypoglycemia (low blood sugar): none     Paresthesias (numbness or burning in feet) or sores: No     Date of last diabetic eye exam: 7-2017    BP Readings from Last 2 Encounters:   02/28/18 140/86   12/04/17 132/84     Hemoglobin A1C (%)   Date Value   12/04/2017 10.2 (H)   09/06/2017 10.6 (H)     LDL Cholesterol Calculated (mg/dL)   Date Value   09/06/2017 49   06/14/2016     Cannot estimate LDL when triglyceride exceeds 400 mg/dL     LDL Cholesterol Direct (mg/dL)   Date Value   06/02/2017 76   06/14/2016 68       Amount of exercise or physical activity: None    Problems taking medications regularly: No    Medication side effects: none    Diet: regular (no restrictions)    SUBJECTIVE:  Here today primarily in follow-up of right knee pain.  This is been an ongoing issue for a number of years.  MRI in 2014 confirmed both the lateral and likely meniscal tear.  He met with Dr. Siddiqi and they discussed management options and he declined surgery at that time.  He is still looking for nonsurgical treatment options.  We have discussed gentle weightbearing exercise, but his compliance with exercise is not real great.  Does walk a lot at work but does not do recreational exercise.  We have discussed cortisone injections in the past and he has been hesitant to have this done.  He recently spoke with a friend who suggested he give it a try and he is willing to do so.  But he is leaving town in a couple of days for a business trip and wants to defer until after that trip.    Diabetes as usual has not been well controlled.  Admits that his compliance with medications has been poor.  Diet has been poor.  No significant recreational exercise.  Denies numbness or tingling into his feet and feels  "sensation is still normal.      Review of systems otherwise negative.  Past medical, family, and social history reviewed and updated in chart.    OBJECTIVE:  /86 (BP Location: Right arm, Patient Position: Sitting, Cuff Size: Adult Large)  Pulse 77  Temp 98  F (36.7  C) (Oral)  Resp 20  Ht 1.651 m (5' 5\")  Wt 110.5 kg (243 lb 9.6 oz)  SpO2 98%  BMI 40.54 kg/m2  Alert, pleasant, upbeat, and in no apparent discomfort.  Obese  S1 and S2 normal, no murmurs, clicks, gallops or rubs. Regular rate and rhythm. Chest is clear; no wheezes or rales. No edema or JVD.   FEET: both sides normal; no swelling, tenderness or skin or vascular lesions.  Sensation is intact. Peripheral pulses are palpable. Toenails are normal.   Past labs reviewed with the patient.     ASSESSMENT / PLAN:  (M25.561,  G89.29) Chronic pain of right knee  (primary encounter diagnosis)  Comment: I will refill his pain medication.  I have discussed with him but decreasing reliance on this.  That being said, I do not suspect any type of misuse or abuse of the medication.  We will continue to monitor and her program.  But I do think he would benefit greatly from a cortisone injection.  He may also benefit from something such as an off  brace if he is not considering surgery as an option.  To this extent had like to get him in with sports medicine in a few weeks to discuss  Plan: oxyCODONE-acetaminophen (PERCOCET) 7.5-325 MG         per tablet, ORTHO  REFERRAL,         DISCONTINUED: oxyCODONE-acetaminophen         (PERCOCET) 7.5-325 MG per tablet, DISCONTINUED:        oxyCODONE-acetaminophen (PERCOCET) 7.5-325 MG         per tablet            (E11.65) Type 2 diabetes mellitus with hyperglycemia, without long-term current use of insulin (H)  Comment: No point rechecking A1c today as I am certain I will be at least as high as previous.  Stressed compliance.  I really think he needs injectable therapy, but he has been hesitant to do so " and if his compliance with oral medication is poor, I can only imagine it will be poor with injectables as well.  Plan: FOOT EXAM  NO CHARGE [56153.114]            Follow up 3 months  STEW Terry MD    (Chart documentation completed in part with Dragon voice-recognition software.  Even though reviewed some grammatical, spelling, and word errors may remain.)

## 2018-03-19 DIAGNOSIS — N52.9 ERECTILE DYSFUNCTION, UNSPECIFIED ERECTILE DYSFUNCTION TYPE: ICD-10-CM

## 2018-03-19 DIAGNOSIS — E11.65 TYPE 2 DIABETES MELLITUS WITH HYPERGLYCEMIA, WITHOUT LONG-TERM CURRENT USE OF INSULIN (H): Primary | ICD-10-CM

## 2018-03-19 NOTE — TELEPHONE ENCOUNTER
"Requested Prescriptions   Pending Prescriptions Disp Refills     tadalafil (CIALIS) 20 MG tablet  Last Written Prescription Date:  01/05/16  Last Fill Quantity: 10,  # refills: 5   Last Office Visit with [facility] or [facility] prescribing provider:  02/28/18   Future Office Visit:    10 tablet 5     Sig: Take 1 tablet (20 mg) by mouth daily as needed    Erectile Dysfuction Protocol Passed    3/19/2018 11:34 AM       Passed - Absence of nitrates on medication list       Passed - Absence of Alpha Blockers on Med list       Passed - Recent (12 mo) or future (30 days) visit within the authorizing provider's specialty    Patient had office visit in the last 12 months or has a visit in the next 30 days with authorizing provider or within the authorizing provider's specialty.  See \"Patient Info\" tab in inbasket, or \"Choose Columns\" in Meds & Orders section of the refill encounter.           Passed - Patient is age 18 or older        See phone message below, patient also requesting test strips  "

## 2018-03-19 NOTE — TELEPHONE ENCOUNTER
...Reason for Call:   prescription    Detailed comments: Patient would like refill on script for CIALIS. Patient also need test stirips for onetouch mini.    Phone Number Patient can be reached at: Home number on file [phone] (home)    Best Time: ANYTIME    Can we leave a detailed message on this number? YES    Call taken on 3/19/2018 at 11:32 AM by Jennifer Martínez

## 2018-03-20 RX ORDER — TADALAFIL 20 MG/1
20 TABLET ORAL DAILY PRN
Qty: 10 TABLET | Refills: 5 | Status: SHIPPED | OUTPATIENT
Start: 2018-03-20 | End: 2018-03-27

## 2018-03-20 NOTE — TELEPHONE ENCOUNTER
Prescription approved per [facility] Refill Protocol.    Lida Olsen RN, BSN

## 2018-03-23 ENCOUNTER — TELEPHONE (OUTPATIENT)
Dept: FAMILY MEDICINE | Facility: CLINIC | Age: 48
End: 2018-03-23

## 2018-03-23 DIAGNOSIS — N52.9 ERECTILE DYSFUNCTION, UNSPECIFIED ERECTILE DYSFUNCTION TYPE: ICD-10-CM

## 2018-03-23 NOTE — TELEPHONE ENCOUNTER
PA needed for tadalafil (CIALIS) 20 MG tablet  PA or change medication?    To Complete PA:  1. Go to key.covermymeds.com and click Enter a Key  2. Key: [id number]  Last Name: Maynor  : 1970  3. Complete form and click Send to Plan

## 2018-03-25 DIAGNOSIS — E11.65 TYPE 2 DIABETES MELLITUS WITH HYPERGLYCEMIA, WITHOUT LONG-TERM CURRENT USE OF INSULIN (H): ICD-10-CM

## 2018-03-26 DIAGNOSIS — I10 ESSENTIAL HYPERTENSION WITH GOAL BLOOD PRESSURE LESS THAN 130/80: ICD-10-CM

## 2018-03-26 NOTE — TELEPHONE ENCOUNTER
"Requested Prescriptions   Pending Prescriptions Disp Refills     chlorthalidone (HYGROTON) 25 MG tablet 90 tablet 1     Sig: Take 1 tablet (25 mg) by mouth daily    Diuretics (Including Combos) Protocol Failed    3/26/2018 10:04 AM       Failed - Blood pressure under 140/90 in past 12 months    BP Readings from Last 3 Encounters:   02/28/18 140/86   12/04/17 132/84   09/06/17 136/84                Passed - Recent (12 mo) or future (30 days) visit within the authorizing provider's specialty    Patient had office visit in the last 12 months or has a visit in the next 30 days with authorizing provider or within the authorizing provider's specialty.  See \"Patient Info\" tab in inbasket, or \"Choose Columns\" in Meds & Orders section of the refill encounter.           Passed - Patient is age 18 or older       Passed - Normal serum creatinine on file in past 12 months    Recent Labs   Lab Test  09/06/17   0948   CR  0.94             Passed - Normal serum potassium on file in past 12 months    Recent Labs   Lab Test  09/06/17   0948   POTASSIUM  3.9                   Passed - Normal serum sodium on file in past 12 months    Recent Labs   Lab Test  09/06/17   0948   NA  139              chlorthalidone (HYGROTON) 25 MG tablet  Last Written Prescription Date:  12/4/17  Last Fill Quantity: 90,  # refills: 1   Last office visit: 2/28/2018 with prescribing provider:  Dr. Terry   Future Office Visit:      "

## 2018-03-26 NOTE — TELEPHONE ENCOUNTER
This writer attempted to contact 1 on 03/26/18      Reason for call Rx request and left detailed message.      If patient calls back:   Relay message the patient should be aware this is not a covered medication.  Will have to pay cash for it.  Can explore getting it out of [facility] on line if he'd like., (read verbatim), document that pt called and close encounter    Mona Taveras, Medical Assistant

## 2018-03-26 NOTE — TELEPHONE ENCOUNTER
No PA needed - the patient should be aware this is not a covered medication.  Will have to pay cash for it.  Can explore getting it out of [facility] on line if he'd like.

## 2018-03-27 RX ORDER — SILDENAFIL 100 MG/1
100 TABLET, FILM COATED ORAL DAILY PRN
Qty: 12 TABLET | Refills: 1 | Status: SHIPPED | OUTPATIENT
Start: 2018-03-27 | End: 2018-11-21 | Stop reason: DRUGHIGH

## 2018-03-27 NOTE — TELEPHONE ENCOUNTER
Request for a 90 day supply. Prescription approved per [facility] Refill Protocol.    Lida Olsen RN, BSN

## 2018-03-27 NOTE — TELEPHONE ENCOUNTER
Reason for Call:  Other prescription    Detailed comments: Pt returning phone call for he spoke with [facility] and they stated that if Pt were to request an alternative to Cialis they would cover and would like a call back to see if that request can be processed.    Phone Number Patient can be reached at: Home number on file [phone] (home)    Best Time: anytime    Can we leave a detailed message on this number? YES    Call taken on 3/27/2018 at 10:52 AM by George Mccloud

## 2018-03-27 NOTE — TELEPHONE ENCOUNTER
"Requested Prescriptions   Pending Prescriptions Disp Refills     BONNY CONTOUR NEXT test strip [Pharmacy Med Name: CONTOUR NEXT TEST [id number]]  MAY BE DUPLICATE REQUEST  Last Written Prescription Date:  3/20/18  Last Fill Quantity: 100 strip,  # refills: 11   Last office visit: 2/28/2018 with prescribing provider:  Dr. Terry   Future Office Visit:   300 strip 11     Sig: USE TO TEST BLOOD SUGARS 1-4 TIMES DAILY OR AS DIRECTED    Diabetic Supplies Protocol Passed    3/25/2018  1:24 PM       Passed - Patient is 18 years of age or older       Passed - Recent (6 mo) or future (30 days) visit within the authorizing provider's specialty    Patient had office visit in the last 6 months or has a visit in the next 30 days with authorizing provider.  See \"Patient Info\" tab in inbasket, or \"Choose Columns\" in Meds & Orders section of the refill encounter.              "

## 2018-03-28 RX ORDER — CHLORTHALIDONE 25 MG/1
25 TABLET ORAL DAILY
Qty: 90 TABLET | Refills: 0 | Status: SHIPPED | OUTPATIENT
Start: 2018-03-28 | End: 2018-06-06

## 2018-03-28 NOTE — TELEPHONE ENCOUNTER
Routing refill request to provider for review/approval because:  BP is out of range.    Lida Olsen RN, BSN

## 2018-03-29 ENCOUNTER — TELEPHONE (OUTPATIENT)
Dept: FAMILY MEDICINE | Facility: CLINIC | Age: 48
End: 2018-03-29

## 2018-03-29 DIAGNOSIS — N52.9 ERECTILE DYSFUNCTION, UNSPECIFIED ERECTILE DYSFUNCTION TYPE: ICD-10-CM

## 2018-03-29 RX ORDER — SILDENAFIL CITRATE 20 MG/1
TABLET ORAL
Qty: 90 TABLET | Refills: 0 | Status: SHIPPED | OUTPATIENT
Start: 2018-03-29 | End: 2018-07-19

## 2018-03-29 NOTE — TELEPHONE ENCOUNTER
Reason for Call:  Other prescription    Detailed comments: [facility] needs auth for 100mg or ok and does not need authorization for 5-20 mg of Sildenafil,     Phone Number Abhijeet can be reached at: [phone]  Please call them back to say what dose you have agreed on and if you still need them to process the 100 mg request.    Best Time: any    Can we leave a detailed message on this number? YES    Call taken on 3/29/2018 at 9:39 AM by Christelle Hall

## 2018-05-02 ENCOUNTER — TELEPHONE (OUTPATIENT)
Dept: FAMILY MEDICINE | Facility: CLINIC | Age: 48
End: 2018-05-02

## 2018-05-02 DIAGNOSIS — G89.4 CHRONIC PAIN SYNDROME: ICD-10-CM

## 2018-05-02 DIAGNOSIS — E11.65 TYPE 2 DIABETES MELLITUS WITH HYPERGLYCEMIA, WITHOUT LONG-TERM CURRENT USE OF INSULIN (H): Primary | ICD-10-CM

## 2018-05-02 NOTE — TELEPHONE ENCOUNTER
Reason for Call:  Same Day Appointment, Requested Provider:  Lizett Terry M.D.    PCP: Lizett Terry    Reason for visit: med ck / refills    Have you been treated for this in the past? Yes    Additional comments: Pt calling for he will be out of his Oxycodone Rx on May 28th and would like to see if he can be fit into Dr. Terry's schedule on 05/18/18 before his Rx runs out for additional refills.    Can we leave a detailed message on this number? YES    Phone number patient can be reached at: Home number on file [phone] (home)    Best Time: anytime    Call taken on 5/2/2018 at 4:06 PM by George Mccloud

## 2018-05-27 ENCOUNTER — MYC REFILL (OUTPATIENT)
Dept: FAMILY MEDICINE | Facility: CLINIC | Age: 48
End: 2018-05-27

## 2018-05-27 DIAGNOSIS — J31.0 CHRONIC RHINITIS, UNSPECIFIED TYPE: ICD-10-CM

## 2018-05-27 DIAGNOSIS — M25.561 CHRONIC PAIN OF RIGHT KNEE: ICD-10-CM

## 2018-05-27 DIAGNOSIS — G89.29 CHRONIC PAIN OF RIGHT KNEE: ICD-10-CM

## 2018-05-29 DIAGNOSIS — M25.561 CHRONIC PAIN OF RIGHT KNEE: ICD-10-CM

## 2018-05-29 DIAGNOSIS — G89.29 CHRONIC PAIN OF RIGHT KNEE: ICD-10-CM

## 2018-05-29 RX ORDER — FEXOFENADINE HCL 180 MG/1
180 TABLET ORAL DAILY
Qty: 90 TABLET | Refills: 1 | Status: SHIPPED | OUTPATIENT
Start: 2018-05-29 | End: 2018-12-19

## 2018-05-29 RX ORDER — OXYCODONE AND ACETAMINOPHEN 7.5; 325 MG/1; MG/1
1-2 TABLET ORAL 3 TIMES DAILY PRN
Qty: 150 TABLET | Refills: 0 | Status: SHIPPED | OUTPATIENT
Start: 2018-05-29 | End: 2018-06-06

## 2018-05-29 RX ORDER — OXYCODONE AND ACETAMINOPHEN 7.5; 325 MG/1; MG/1
1-2 TABLET ORAL 3 TIMES DAILY PRN
Qty: 150 TABLET | Refills: 0 | Status: CANCELLED | OUTPATIENT
Start: 2018-05-29

## 2018-05-29 NOTE — TELEPHONE ENCOUNTER
Requested Prescriptions   Pending Prescriptions Disp Refills     oxyCODONE-acetaminophen (PERCOCET) 7.5-325 MG per tablet      Last Written Prescription Date:  4/28/18  Last Fill Quantity: 150 tablet,   # refills: 0  Last Office Visit: 2/28/18 Dr. Terry  Future Office visit:    Next 5 appointments (look out 90 days)     Jun 06, 2018  8:40 AM CDT   [facility]ett [facility]    [address] [place] [id number] [phone]                   Routing refill request to provider for review/approval because:  Drug not on the [facility], [facility] or  Health refill protocol or controlled substance 150 tablet 0     Sig: Take 1-2 tablets by mouth 3 times daily as needed for pain    There is no refill protocol information for this order

## 2018-05-29 NOTE — TELEPHONE ENCOUNTER
Reason for Call:  Medication or medication refill:    Do you use a [facility]?  Name of the pharmacy and phone number for the current request:  Written rx     Name of the medication requested: Pending Prescriptions:                       Disp   Refills    oxyCODONE-acetaminophen (PERCOCET) 7.5-32*150 ta*0            Sig: Take 1-2 tablets by mouth 3 times daily as needed           for pain    Other request: Please call when ready     Can we leave a detailed message on this number? YES    Phone number patient can be reached at: Cell number on file:    Telephone Information:   Mobile [phone]     Best Time: any    Call taken on 5/29/2018 at 9:53 AM by Christelle Hall

## 2018-05-29 NOTE — TELEPHONE ENCOUNTER
Message from MyChart:  Original authorizing provider: Lizett Terry MD    Xavier FARRBrian Maynor would like a refill of the following medications:  fexofenadine (ALLEGRA) 180 MG tablet [Lizett Terry MD]  oxyCODONE-acetaminophen (PERCOCET) 7.5-325 MG per tablet [Lizett Terry MD]    Preferred pharmacy: [facility] [address] [place] [address] [address] AT [facility] [address]    Comment:  Agustin terry, I was able to schedule an apppointment June 6th. My refill date is May 28th and im almost out of meds & need a refill. Thank you, [facility]  [phone]

## 2018-05-29 NOTE — TELEPHONE ENCOUNTER
"Requested Prescriptions   Pending Prescriptions Disp Refills     fexofenadine (ALLEGRA) 180 MG tablet  Last Written Prescription Date:  5/14/15  Last Fill Quantity: 90 tablet,  # refills: 1   Last office visit: 2/28/2018 with prescribing provider:  Dr. Terry  Future Office Visit:   Next 5 appointments (look out 90 days)     Jun 06, 2018  8:40 AM CDT   [place]ett [place]   [facility] [place]    [address] [place] [address]   [phone]               90 tablet 1     Sig: Take 1 tablet (180 mg) by mouth daily    Antihistamines Protocol Passed    5/29/2018  6:41 AM       Passed - Patient is 3-64 years of age    Apply weight-based dosing for peds patients age 3 - 12 years of age.    Forward request to provider for patients under the age of 3 or over the age of 64.         Passed - Recent (12 mo) or future (30 days) visit within the authorizing provider's specialty    Patient had office visit in the last 12 months or has a visit in the next 30 days with authorizing provider or within the authorizing provider's specialty.  See \"Patient Info\" tab in inbasket, or \"Choose Columns\" in Meds & Orders section of the refill encounter.                      oxyCODONE-acetaminophen (PERCOCET) 7.5-325 MG per tablet     Last Written Prescription Date: 4/28/18  Last Fill Quantity: 150 tablet,   # refills: 0  Last Office Visit: 2/28/18 Dr. Terry  Future Office visit:    Next 5 appointments (look out 90 days)     Jun 06, 2018  8:40 AM CDT   [facility]    [address] [place] [address]   [phone]                   Routing refill request to provider for review/approval because:  Drug not on the [facility], [facility] or  Health refill protocol or controlled substance 150 tablet 0     Sig: Take 1-2 tablets by mouth 3 times daily as needed for pain "    There is no refill protocol information for this order

## 2018-05-29 NOTE — TELEPHONE ENCOUNTER
Script available for , notified patient/family via [facility] - script to .  PSK      Allegra ok for prn use.   Refill now - has follow up appointment with Dr. Terry on 6/6/18  PSK

## 2018-05-29 NOTE — TELEPHONE ENCOUNTER
Routing refill request to provider for review/approval because:  Drug not on the [facility] refill protocol-Percocet  A break in medication-fexofenadine has not been prescribed since 5/14/15    Lida Olsen RN, BSN

## 2018-06-06 ENCOUNTER — OFFICE VISIT (OUTPATIENT)
Dept: FAMILY MEDICINE | Facility: CLINIC | Age: 48
End: 2018-06-06
Payer: COMMERCIAL

## 2018-06-06 VITALS
SYSTOLIC BLOOD PRESSURE: 134 MMHG | WEIGHT: 245 LBS | DIASTOLIC BLOOD PRESSURE: 86 MMHG | BODY MASS INDEX: 40.82 KG/M2 | HEIGHT: 65 IN | TEMPERATURE: 98.4 F | OXYGEN SATURATION: 98 % | HEART RATE: 78 BPM

## 2018-06-06 DIAGNOSIS — G89.29 CHRONIC PAIN OF RIGHT KNEE: ICD-10-CM

## 2018-06-06 DIAGNOSIS — E66.01 MORBID OBESITY WITH BMI OF 40.0-44.9, ADULT (H): ICD-10-CM

## 2018-06-06 DIAGNOSIS — M25.561 CHRONIC PAIN OF RIGHT KNEE: ICD-10-CM

## 2018-06-06 DIAGNOSIS — E11.65 TYPE 2 DIABETES MELLITUS WITH HYPERGLYCEMIA, WITHOUT LONG-TERM CURRENT USE OF INSULIN (H): Primary | ICD-10-CM

## 2018-06-06 DIAGNOSIS — I10 ESSENTIAL HYPERTENSION WITH GOAL BLOOD PRESSURE LESS THAN 130/80: ICD-10-CM

## 2018-06-06 DIAGNOSIS — G89.4 CHRONIC PAIN SYNDROME: ICD-10-CM

## 2018-06-06 LAB
ALBUMIN SERPL-MCNC: 3.7 G/DL (ref 3.4–5)
ALP SERPL-CCNC: 86 U/L (ref 40–150)
ALT SERPL W P-5'-P-CCNC: 31 U/L (ref 0–70)
ANION GAP SERPL CALCULATED.3IONS-SCNC: 8 MMOL/L (ref 3–14)
AST SERPL W P-5'-P-CCNC: 16 U/L (ref 0–45)
BILIRUB SERPL-MCNC: 0.3 MG/DL (ref 0.2–1.3)
BUN SERPL-MCNC: 14 MG/DL (ref 7–30)
CALCIUM SERPL-MCNC: 9 MG/DL (ref 8.5–10.1)
CHLORIDE SERPL-SCNC: 104 MMOL/L (ref 94–109)
CHOLEST SERPL-MCNC: 145 MG/DL
CO2 SERPL-SCNC: 26 MMOL/L (ref 20–32)
CREAT SERPL-MCNC: 0.96 MG/DL (ref 0.66–1.25)
CREAT UR-MCNC: 174 MG/DL
GFR SERPL CREATININE-BSD FRML MDRD: 84 ML/MIN/1.7M2
GLUCOSE SERPL-MCNC: 230 MG/DL (ref 70–99)
HBA1C MFR BLD: 10.2 % (ref 0–5.6)
HDLC SERPL-MCNC: 37 MG/DL
LDLC SERPL CALC-MCNC: 32 MG/DL
MICROALBUMIN UR-MCNC: 16 MG/L
MICROALBUMIN/CREAT UR: 9.37 MG/G CR (ref 0–17)
NONHDLC SERPL-MCNC: 108 MG/DL
POTASSIUM SERPL-SCNC: 3.5 MMOL/L (ref 3.4–5.3)
PROT SERPL-MCNC: 7 G/DL (ref 6.8–8.8)
SODIUM SERPL-SCNC: 138 MMOL/L (ref 133–144)
TRIGL SERPL-MCNC: 379 MG/DL
TSH SERPL DL<=0.005 MIU/L-ACNC: 1.18 MU/L (ref 0.4–4)

## 2018-06-06 PROCEDURE — 84443 ASSAY THYROID STIM HORMONE: CPT | Performed by: FAMILY MEDICINE

## 2018-06-06 PROCEDURE — 99207 C FOOT EXAM  NO CHARGE: CPT | Performed by: FAMILY MEDICINE

## 2018-06-06 PROCEDURE — 36415 COLL VENOUS BLD VENIPUNCTURE: CPT | Performed by: FAMILY MEDICINE

## 2018-06-06 PROCEDURE — 80061 LIPID PANEL: CPT | Performed by: FAMILY MEDICINE

## 2018-06-06 PROCEDURE — 99214 OFFICE O/P EST MOD 30 MIN: CPT | Performed by: FAMILY MEDICINE

## 2018-06-06 PROCEDURE — 83036 HEMOGLOBIN GLYCOSYLATED A1C: CPT | Performed by: FAMILY MEDICINE

## 2018-06-06 PROCEDURE — 80053 COMPREHEN METABOLIC PANEL: CPT | Performed by: FAMILY MEDICINE

## 2018-06-06 PROCEDURE — 99000 SPECIMEN HANDLING OFFICE-LAB: CPT | Performed by: FAMILY MEDICINE

## 2018-06-06 PROCEDURE — 80307 DRUG TEST PRSMV CHEM ANLYZR: CPT | Mod: 90 | Performed by: FAMILY MEDICINE

## 2018-06-06 PROCEDURE — 82043 UR ALBUMIN QUANTITATIVE: CPT | Performed by: FAMILY MEDICINE

## 2018-06-06 RX ORDER — AMLODIPINE BESYLATE 10 MG/1
10 TABLET ORAL DAILY
Qty: 90 TABLET | Refills: 1 | Status: SHIPPED | OUTPATIENT
Start: 2018-06-06 | End: 2018-12-19

## 2018-06-06 RX ORDER — OXYCODONE AND ACETAMINOPHEN 7.5; 325 MG/1; MG/1
1-2 TABLET ORAL 3 TIMES DAILY PRN
Qty: 150 TABLET | Refills: 0 | Status: SHIPPED | OUTPATIENT
Start: 2018-06-28 | End: 2018-06-06

## 2018-06-06 RX ORDER — LOSARTAN POTASSIUM 50 MG/1
50 TABLET ORAL DAILY
Qty: 90 TABLET | Refills: 1 | Status: SHIPPED | OUTPATIENT
Start: 2018-06-06 | End: 2019-03-15

## 2018-06-06 RX ORDER — GLIMEPIRIDE 4 MG/1
4 TABLET ORAL 2 TIMES DAILY
Qty: 180 TABLET | Refills: 1 | Status: SHIPPED | OUTPATIENT
Start: 2018-06-06 | End: 2018-12-19

## 2018-06-06 RX ORDER — CHLORTHALIDONE 25 MG/1
25 TABLET ORAL DAILY
Qty: 90 TABLET | Refills: 1 | Status: SHIPPED | OUTPATIENT
Start: 2018-06-06 | End: 2018-12-19

## 2018-06-06 RX ORDER — PIOGLITAZONEHYDROCHLORIDE 45 MG/1
45 TABLET ORAL DAILY
Qty: 90 TABLET | Refills: 1 | Status: SHIPPED | OUTPATIENT
Start: 2018-06-06 | End: 2018-12-19

## 2018-06-06 RX ORDER — OXYCODONE AND ACETAMINOPHEN 7.5; 325 MG/1; MG/1
1-2 TABLET ORAL 3 TIMES DAILY PRN
Qty: 150 TABLET | Refills: 0 | Status: SHIPPED | OUTPATIENT
Start: 2018-08-25 | End: 2018-09-10

## 2018-06-06 RX ORDER — OXYCODONE AND ACETAMINOPHEN 7.5; 325 MG/1; MG/1
1-2 TABLET ORAL 3 TIMES DAILY PRN
Qty: 150 TABLET | Refills: 0 | Status: SHIPPED | OUTPATIENT
Start: 2018-07-27 | End: 2018-06-06

## 2018-06-06 ASSESSMENT — ANXIETY QUESTIONNAIRES
6. BECOMING EASILY ANNOYED OR IRRITABLE: SEVERAL DAYS
2. NOT BEING ABLE TO STOP OR CONTROL WORRYING: NOT AT ALL
1. FEELING NERVOUS, ANXIOUS, OR ON EDGE: NOT AT ALL
IF YOU CHECKED OFF ANY PROBLEMS ON THIS QUESTIONNAIRE, HOW DIFFICULT HAVE THESE PROBLEMS MADE IT FOR YOU TO DO YOUR WORK, TAKE CARE OF THINGS AT HOME, OR GET ALONG WITH OTHER PEOPLE: NOT DIFFICULT AT ALL
GAD7 TOTAL SCORE: 1
7. FEELING AFRAID AS IF SOMETHING AWFUL MIGHT HAPPEN: NOT AT ALL
3. WORRYING TOO MUCH ABOUT DIFFERENT THINGS: NOT AT ALL
5. BEING SO RESTLESS THAT IT IS HARD TO SIT STILL: NOT AT ALL

## 2018-06-06 ASSESSMENT — PATIENT HEALTH QUESTIONNAIRE - PHQ9: 5. POOR APPETITE OR OVEREATING: NOT AT ALL

## 2018-06-06 ASSESSMENT — PAIN SCALES - GENERAL: PAINLEVEL: SEVERE PAIN (7)

## 2018-06-06 NOTE — PROGRESS NOTES
SUBJECTIVE:   Xavier Mcguire is a 48 year old male who presents to clinic today for the following health issues:      Chronic Pain Follow-Up       Type / Location of Pain: Right knee  Analgesia/pain control:       Recent changes:  Worse but has increased activity      Overall control: Tolerable with discomfort  Activity level/function:      Daily activities:  Able to do moderate activities    Work:  Able to work part time with limitations  Adverse effects:  No  Adherance    Taking medication as directed?  Yes    Participating in other treatments: no   Risk Factors:    Sleep:  Fair    Mood/anxiety:  controlled    Recent family or social stressors:  none noted    Other aggravating factors: Being on knees  PHQ-9 SCORE 9/20/2016 3/8/2017 12/4/2017   Total Score - - -   Total Score 3 7 2     MIKE-7 SCORE 9/20/2016 3/8/2017 12/4/2017   Total Score - - -   Total Score 1 5 0     Encounter-Level CSA - 08/10/2015:          Controlled Substance Agreement - Scan on 8/12/2015  1:06 PM : [facility] CONTROLLED SUBSTANCE AGREEMENT (below)                Amount of exercise or physical activity: 2-3 days/week for an average of 15-30 minutes    Problems taking medications regularly: No    Medication side effects: none    Diet: no added salt        Diabetes Follow-up      Patient is checking blood sugars: not in the last month    Diabetic concerns: Before stopped reading blood sugars results were 200-300     Symptoms of hypoglycemia (low blood sugar): none     Paresthesias (numbness or burning in feet) or sores: No     Date of last diabetic eye exam: Unsure    BP Readings from Last 2 Encounters:   02/28/18 140/86   12/04/17 132/84     Hemoglobin A1C (%)   Date Value   12/04/2017 10.2 (H)   09/06/2017 10.6 (H)     LDL Cholesterol Calculated (mg/dL)   Date Value   09/06/2017 49   06/14/2016     Cannot estimate LDL when triglyceride exceeds 400 mg/dL     LDL Cholesterol Direct (mg/dL)   Date Value   06/02/2017 76   06/14/2016 68  "    SUBJECTIVE:  Here today in follow-up of diabetes, hypertension, chronic right knee pain.  Unfortunately the patient has not lost any weight.  As always he admits that diet and exercise have not been what he would like.  He is somewhat limited by the pain in his right knee and we have discussed low impact exercises.  His sugar has not been well controlled with an A1c greater than 10 for some time we discussed the dangers of this.  We have made some adjustments in medication over time but just does not seem to bring his blood sugar down.    Review of systems otherwise negative.  Past medical, family, and social history reviewed and updated in chart.    OBJECTIVE:  /86 (BP Location: Right arm, Patient Position: Chair, Cuff Size: Adult Large)  Pulse 78  Temp 98.4  F (36.9  C) (Oral)  Ht 1.651 m (5' 5\")  Wt 111.1 kg (245 lb)  SpO2 98%  BMI 40.77 kg/m2  Alert, pleasant, upbeat, and in no apparent discomfort.  Morbidly obese  S1 and S2 normal, no murmurs, clicks, gallops or rubs. Regular rate and rhythm. Chest is clear; no wheezes or rales. No edema or JVD.  FEET: both sides normal; no swelling, tenderness or skin or vascular lesions.  Sensation is intact. Peripheral pulses are palpable. Toenails are normal.   Past labs reviewed with the patient.     ASSESSMENT / PLAN:  (E11.65) Type 2 diabetes mellitus with hyperglycemia, without long-term current use of insulin (H)  (primary encounter diagnosis)  Comment: Currently on Metformin and glimepiride and Actos and sugars are not controlled.  I really think he would benefit from the addition/transition of another agent and I would suggest Jardiance to help reduce his risk of cardiovascular disease perhaps even to promote a little bit of weight loss.  Again lifestyle is paramount and this is his responsibility  Plan: metFORMIN (GLUCOPHAGE) 1000 MG tablet,         glimepiride (AMARYL) 4 MG tablet, empagliflozin        (JARDIANCE) 10 MG TABS tablet, pioglitazone     "     (ACTOS) 45 MG tablet            (I10) Essential hypertension with goal blood pressure less than 130/80  Comment: Controlled, but borderline, on current regimen.  Lifestyle  Plan: amLODIPine (NORVASC) 10 MG tablet,         chlorthalidone (HYGROTON) 25 MG tablet,         losartan (COZAAR) 50 MG tablet            (E66.01,  Z68.41) Morbid obesity with BMI of 40.0-44.9, adult (H)  Comment: As above  Plan:     (M25.561,  G89.29) Chronic pain of right knee  Comment: Stable and monitored.  My goal for him would be reduction/elimination therapy but this going to take a change in lifestyle  Plan: oxyCODONE-acetaminophen (PERCOCET) 7.5-325 MG         per tablet, DISCONTINUED:         oxyCODONE-acetaminophen (PERCOCET) 7.5-325 MG         per tablet, DISCONTINUED:         oxyCODONE-acetaminophen (PERCOCET) 7.5-325 MG         per tablet            (G89.4) Chronic pain syndrome  Comment:   Plan: oxyCODONE-acetaminophen (PERCOCET) 7.5-325 MG         per tablet            Follow up 3-6 months based upon lab results.  Likely 3 months  STEW Terry MD    (Chart documentation completed in part with Dragon voice-recognition software.  Even though reviewed some grammatical, spelling, and word errors may remain.)

## 2018-06-06 NOTE — MR AVS SNAPSHOT
After Visit Summary   6/6/2018    Xavier Mcguire    MRN: [id number]           Patient Information     Date Of Birth          1970        Visit Information        Provider Department      6/6/2018 8:40 AM Lizett Terry MD [facility]        Today's Diagnoses     Type 2 diabetes mellitus with hyperglycemia, without long-term current use of insulin (H)    -  1    Essential hypertension with goal blood pressure less than 130/80        Morbid obesity with BMI of 40.0-44.9, adult (H)        Chronic pain of right knee        Chronic pain syndrome           Follow-ups after your visit        Follow-up notes from your care team     Return in about 3 months (around 9/6/2018).      Who to contact     If you have questions or need follow up information about today's clinic visit or your schedule please contact [facility] directly at [phone].  Normal or non-critical lab and imaging results will be communicated to you by MyChart, letter or phone within 4 business days after the clinic has received the results. If you do not hear from us within 7 days, please contact the clinic through [employer] or phone. If you have a critical or abnormal lab result, we will notify you by phone as soon as possible.  Submit refill requests through [employer] or call your pharmacy and they will forward the refill request to us. Please allow 3 business days for your refill to be completed.          Additional Information About Your Visit        MyChart Information     [employer] gives you secure access to your electronic health record. If you see a primary care provider, you can also send messages to your care team and make appointments. If you have questions, please call your primary care clinic.  If you do not have a primary care provider, please call [phone] and they will assist you.        Care EveryWhere ID     This is your Care EveryWhere ID. This could be used by other  "organizations to access your [facility] medical records  [id number]        Your Vitals Were     Pulse Temperature Height Pulse Oximetry BMI (Body Mass Index)       78 98.4  F (36.9  C) (Oral) 1.651 m (5' 5\") 98% 40.77 kg/m2        Blood Pressure from Last 3 Encounters:   06/06/18 134/86   02/28/18 140/86   12/04/17 132/84    Weight from Last 3 Encounters:   06/06/18 111.1 kg (245 lb)   02/28/18 110.5 kg (243 lb 9.6 oz)   12/04/17 112.8 kg (248 lb 9.6 oz)              Today, you had the following     No orders found for display         Today's Medication Changes          These changes are accurate as of 6/6/18  9:11 AM.  If you have any questions, ask your nurse or doctor.               Start taking these medicines.        Dose/Directions    empagliflozin 10 MG Tabs tablet   Commonly known as:  JARDIANCE   Used for:  Type 2 diabetes mellitus with hyperglycemia, without long-term current use of insulin (H)   Started by:  Lizett Terry MD        Dose:  10 mg   Take 1 tablet (10 mg) by mouth daily   Quantity:  90 tablet   Refills:  1       oxyCODONE-acetaminophen 7.5-325 MG per tablet   Commonly known as:  PERCOCET   Used for:  Chronic pain of right knee, Chronic pain syndrome   Started by:  Lizett Terry MD        Dose:  1-2 tablet   Start taking on:  8/25/2018   Take 1-2 tablets by mouth 3 times daily as needed for pain   Quantity:  150 tablet   Refills:  0            Where to get your medicines      These medications were sent to [id number] - [place], [place] - [address] AT [facility] [address], [facility] [address]     Phone:  [phone]     amLODIPine 10 MG tablet    chlorthalidone 25 MG tablet    empagliflozin 10 MG Tabs tablet    glimepiride 4 MG tablet    losartan 50 MG tablet    metFORMIN 1000 MG tablet    pioglitazone 45 MG tablet         Some of these will need a paper prescription and others can be bought over the counter.  Ask your " nurse if you have questions.     Bring a paper prescription for each of these medications     oxyCODONE-acetaminophen 7.5-325 MG per tablet               Information about OPIOIDS     PRESCRIPTION OPIOIDS: WHAT YOU NEED TO KNOW   You have a prescription for an opioid (narcotic) pain medicine. Opioids can cause addiction. If you have a history of chemical dependency of any type, you are at a higher risk of becoming addicted to opioids. Only take this medicine after all other options have been tried. Take it for as short a time and as few doses as possible.     Do not:    Drive. If you drive while taking these medicines, you could be arrested for driving under the influence (DUI).    Operate heavy machinery    Do any other dangerous activities while taking these medicines.     Drink any alcohol while taking these medicines.      Take with any other medicines that contain acetaminophen. Read all labels carefully. Look for the word  acetaminophen  or  Tylenol.  Ask your pharmacist if you have questions or are unsure.    Store your pills in a secure place, locked if possible. We will not replace any lost or stolen medicine. If you don t finish your medicine, please throw away (dispose) as directed by your pharmacist. The [place] Pollution Control Agency has more information about safe disposal: https://www.pca.[facility].[place].us/living-green/managing-unwanted-medications    All opioids tend to cause constipation. Drink plenty of water and eat foods that have a lot of fiber, such as fruits, vegetables, prune juice, apple juice and high-fiber cereal. Take a laxative (Miralax, milk of magnesia, Colace, Senna) if you don t move your bowels at least every other day.          Primary Care Provider Office Phone # Fax #    Lizett Terry [phone] [address] [facility] [address]        Equal Access to Services     BALDEMAR SORIA AH: osmany Khan qaybta  jacobo lopez ryanjordi yung wall ah. [facility] [phone].    ATENCIÓN: Si chula carnes, tiene a hunt disposición servicios gratuitos de asistencia lingüística. Sherman al [phone].    We comply with applicable federal civil rights laws and [place] laws. We do not discriminate on the basis of race, color, national origin, age, disability, sex, sexual orientation, or gender identity.            Thank you!     Thank you for choosing [facility]  for your care. Our goal is always to provide you with excellent care. Hearing back from our patients is one way we can continue to improve our services. Please take a few minutes to complete the written survey that you may receive in the mail after your visit with us. Thank you!             Your Updated Medication List - Protect others around you: Learn how to safely use, store and throw away your medicines at www.disposemymeds.org.          This list is accurate as of 6/6/18  9:11 AM.  Always use your most recent med list.                   Brand Name Dispense Instructions for use Diagnosis    ACE NOT PRESCRIBED (INTENTIONAL)      continuous prn for other ACE Inhibitor not prescribed due to Intolerance        albuterol 108 (90 Base) MCG/ACT Inhaler    PROAIR HFA/PROVENTIL HFA/VENTOLIN HFA    1 Inhaler    Inhale 2 puffs into the lungs every 4 hours as needed for shortness of breath / dyspnea or wheezing    Acute bronchitis, unspecified organism       ALPRAZolam 0.5 MG tablet    XANAX    30 tablet    1/2 to 1 tab three times daily as needed    Situational anxiety       amLODIPine 10 MG tablet    NORVASC    90 tablet    Take 1 tablet (10 mg) by mouth daily    Essential hypertension with goal blood pressure less than 130/80       aspirin 81 MG tablet     100 tablet    Take 1 tablet by mouth daily.    Type 2 diabetes, HbA1c goal < 7% (H)       BONNY CONTOUR NEXT test strip   Generic drug:  blood glucose monitoring     300 strip    USE TO  TEST BLOOD SUGARS 1-4 TIMES DAILY OR AS DIRECTED    Type 2 diabetes mellitus with hyperglycemia, without long-term current use of insulin (H)       chlorthalidone 25 MG tablet    HYGROTON    90 tablet    Take 1 tablet (25 mg) by mouth daily    Essential hypertension with goal blood pressure less than 130/80       empagliflozin 10 MG Tabs tablet    JARDIANCE    90 tablet    Take 1 tablet (10 mg) by mouth daily    Type 2 diabetes mellitus with hyperglycemia, without long-term current use of insulin (H)       fexofenadine 180 MG tablet    ALLEGRA    90 tablet    Take 1 tablet (180 mg) by mouth daily    Chronic rhinitis, unspecified type       glimepiride 4 MG tablet    AMARYL    180 tablet    Take 1 tablet (4 mg) by mouth 2 times daily    Type 2 diabetes mellitus with hyperglycemia, without long-term current use of insulin (H)       ipratropium - albuterol 0.5 mg/2.5 mg/3 mL 0.5-2.5 (3) MG/3ML neb solution    DUONEB    90 vial    Take 1 vial (3 mLs) by nebulization every 6 hours as needed for shortness of breath / dyspnea or wheezing    Acute bronchitis, unspecified organism       losartan 50 MG tablet    COZAAR    90 tablet    Take 1 tablet (50 mg) by mouth daily    Essential hypertension with goal blood pressure less than 130/80       metFORMIN 1000 MG tablet    GLUCOPHAGE    180 tablet    Take 1 tablet (1,000 mg) by mouth 2 times daily (with meals) with breakfast and dinner.    Type 2 diabetes mellitus with hyperglycemia, without long-term current use of insulin (H)       ONE TOUCH DELICA LANCETS Misc     200 each    1 Device 2 times daily    Type 2 diabetes, HbA1c goal < 7% (H)       * order for DME     1 Device    Equipment being ordered: Nebulizer    Acute bronchitis, unspecified organism       * order for DME     1 Device    Equipment being ordered: Glucometer, per insurance plan Tests daily    Type 2 diabetes mellitus with hyperglycemia, without long-term current use of insulin (H)       * order for DME     100  Units    Equipment being ordered: Test strips and lancets - per device and insurance plan. Tests daily. Disp: 100 Refill: 3    Type 2 diabetes mellitus with hyperglycemia, without long-term current use of insulin (H)       * order for DME     1 Device    Equipment being ordered: glucometer - test daily    Type 2 diabetes mellitus with hyperglycemia, without long-term current use of insulin (H)       oxyCODONE-acetaminophen 7.5-325 MG per tablet   Start taking on:  8/25/2018    PERCOCET    150 tablet    Take 1-2 tablets by mouth 3 times daily as needed for pain    Chronic pain of right knee, Chronic pain syndrome       pioglitazone 45 MG tablet    ACTOS    90 tablet    Take 1 tablet (45 mg) by mouth daily    Type 2 diabetes mellitus with hyperglycemia, without long-term current use of insulin (H)       sildenafil 100 MG tablet    VIAGRA    12 tablet    Take 1 tablet (100 mg) by mouth daily as needed 30 min to 4 hrs before sex. Do not use with nitroglycerin, terazosin or doxazosin.    Erectile dysfunction, unspecified erectile dysfunction type       sildenafil 20 MG tablet    REVATIO    90 tablet    Take 5 tablets 30 minutes to 4 hours prior to sex.  Never use with nitroglycerin, terazosin or doxazosin.    Erectile dysfunction, unspecified erectile dysfunction type       * Notice:  This list has 4 medication(s) that are the same as other medications prescribed for you. Read the directions carefully, and ask your doctor or other care provider to review them with you.

## 2018-06-07 ASSESSMENT — ANXIETY QUESTIONNAIRES: GAD7 TOTAL SCORE: 1

## 2018-06-07 ASSESSMENT — PATIENT HEALTH QUESTIONNAIRE - PHQ9: SUM OF ALL RESPONSES TO PHQ QUESTIONS 1-9: 2

## 2018-06-11 LAB — COMPREHEN DRUG ANALYSIS UR: NORMAL

## 2018-06-18 NOTE — PROGRESS NOTES
Xavier,  That A1c is still way too high.  I think it is time to move to an injectable treatment.  Insulin is certainly an option, but there are newer ones as well.  Give this some thought, and please make an appointment with me to discuss.    I have reviewed the results of your recent drug screen and noted that it was positive for marijuana.  In response to recommendations from the federal government [place] now has a policy in place to routinely screen patients on chronic pain medicine to ensure that they are taking the medication as prescribed and not combining it with other potentially dangerous medications.    Despite recent legalization of the use of medical marijuana in certain situations, the recreational use of marijuana is still illegal in the [facility].  Such use is in direct conflict with your signed narcotics contract.  I will now require routine screening of the situation and if further violations are found I will no longer be able to prescribe pain medication or other controlled substances.  And if you feel you have developed an issue with the use of marijuana and would like some assistance with this, please let me know.      STEW Terry M.D.

## 2018-07-09 ENCOUNTER — TELEPHONE (OUTPATIENT)
Dept: FAMILY MEDICINE | Facility: CLINIC | Age: 48
End: 2018-07-09

## 2018-07-09 NOTE — TELEPHONE ENCOUNTER
Reason for Call:  Other prescription    Detailed comments: patient received a letter from insurance that the oxyCODONE-acetaminophen (PERCOCET) 7.5-325 MG per tablet, needs a prior authorization for the quantity is over 90 pills.    Phone Number Patient can be reached at: Home number on file [phone] (home)    Best Time: any    Can we leave a detailed message on this number? YES    Call taken on 7/9/2018 at 9:41 AM by Priscilla Pedraza

## 2018-07-10 NOTE — TELEPHONE ENCOUNTER
Central Prior Authorization Team   Phone: [phone]    PA Initiation    Medication:   Insurance Company: [employer] - Phone [phone] Fax [phone]  Pharmacy Filling the Rx: [id number] - [facility] [place] - [address] AT [facility] & [address]  Filling Pharmacy Phone: [phone]  Filling Pharmacy Fax: [phone]  Start Date: 7/10/2018

## 2018-07-12 NOTE — TELEPHONE ENCOUNTER
Prior Authorization Approval    Authorization Effective Date: 6/10/2018  Authorization Expiration Date: 7/10/2019  Medication: OXYCODONE-ACETAMINOPHEN-APPROVED  Approved Dose/Quantity:    Reference #: [id number]   Insurance Company: [employer] - Phone [phone] Fax [phone]  Expected CoPay:       CoPay Card Available:      Foundation Assistance Needed:    Which Pharmacy is filling the prescription (Not needed for infusion/clinic administered): [id number] - [place], [place] - [address] AT [address]  Pharmacy Notified: Yes  Patient Notified: Yes

## 2018-07-17 ENCOUNTER — TELEPHONE (OUTPATIENT)
Dept: FAMILY MEDICINE | Facility: CLINIC | Age: 48
End: 2018-07-17

## 2018-07-17 NOTE — TELEPHONE ENCOUNTER
I am in the process of meeting with my patients on chronic pain medication to discuss our plans going forward.  I have set aside time for this on 7/24 (9-11) and 7/31 (1-4).  Please schedule the patient into one of these slots (or another slot in the near future).    This is a special visit to discuss pain - it will NOT be used to discuss other topics or as a general follow up.    If they have MyChart I am also sending them a note about this, telling them to expect a call.    Thanks.

## 2018-07-19 DIAGNOSIS — N52.9 ERECTILE DYSFUNCTION, UNSPECIFIED ERECTILE DYSFUNCTION TYPE: ICD-10-CM

## 2018-07-20 NOTE — TELEPHONE ENCOUNTER
"Requested Prescriptions   Pending Prescriptions Disp Refills     sildenafil (REVATIO) 20 MG tablet [Pharmacy Med Name: SILDENAFIL 20MG TABLETS]  Last Written Prescription Date:  3/29/18  Last Fill Quantity: 90 tablet,  # refills: 0   Last office visit: 6/6/2018 with prescribing provider:  Dr. Terry   Future Office Visit:   Next 5 appointments (look out 90 days)     Jul 30, 2018  1:20 PM CDT   Office Visit with Lizett Terry MD   [facility]    [address] [place] [id number]   [phone]            Sep 10, 2018  6:40 PM CDT   Office Visit with Lizett Terry MD   [facility] ([address] [address]   [phone]                90 tablet 0     Sig: TAKE 5 TABLETS 30 MINUTES TO 4 HOURS PRIOR TO SEX.    Erectile Dysfuction Protocol Passed    7/19/2018  6:47 PM       Passed - Absence of nitrates on medication list       Passed - Absence of Alpha Blockers on Med list       Passed - Recent (12 mo) or future (30 days) visit within the authorizing provider's specialty    Patient had office visit in the last 12 months or has a visit in the next 30 days with authorizing provider or within the authorizing provider's specialty.  See \"Patient Info\" tab in inbasket, or \"Choose Columns\" in Meds & Orders section of the refill encounter.           Passed - Patient is age 18 or older          "

## 2018-07-23 RX ORDER — SILDENAFIL CITRATE 20 MG/1
TABLET ORAL
Qty: 90 TABLET | Refills: 0 | Status: SHIPPED | OUTPATIENT
Start: 2018-07-23 | End: 2018-11-19

## 2018-07-23 NOTE — TELEPHONE ENCOUNTER
Prescription approved per [facility] [place] [facility].    Lida Olsen RN, BSN

## 2018-08-14 ENCOUNTER — TRANSFERRED RECORDS (OUTPATIENT)
Dept: HEALTH INFORMATION MANAGEMENT | Facility: CLINIC | Age: 48
End: 2018-08-14

## 2018-09-10 ENCOUNTER — OFFICE VISIT (OUTPATIENT)
Dept: FAMILY MEDICINE | Facility: CLINIC | Age: 48
End: 2018-09-10
Payer: COMMERCIAL

## 2018-09-10 VITALS
HEIGHT: 65 IN | WEIGHT: 227 LBS | OXYGEN SATURATION: 95 % | DIASTOLIC BLOOD PRESSURE: 80 MMHG | HEART RATE: 76 BPM | RESPIRATION RATE: 18 BRPM | TEMPERATURE: 98.3 F | BODY MASS INDEX: 37.82 KG/M2 | SYSTOLIC BLOOD PRESSURE: 134 MMHG

## 2018-09-10 DIAGNOSIS — M25.561 CHRONIC PAIN OF RIGHT KNEE: ICD-10-CM

## 2018-09-10 DIAGNOSIS — E11.65 TYPE 2 DIABETES MELLITUS WITH HYPERGLYCEMIA, WITHOUT LONG-TERM CURRENT USE OF INSULIN (H): Primary | ICD-10-CM

## 2018-09-10 DIAGNOSIS — M25.562 LEFT KNEE PAIN, UNSPECIFIED CHRONICITY: ICD-10-CM

## 2018-09-10 DIAGNOSIS — G89.29 CHRONIC PAIN OF RIGHT KNEE: ICD-10-CM

## 2018-09-10 DIAGNOSIS — G89.4 CHRONIC PAIN SYNDROME: ICD-10-CM

## 2018-09-10 DIAGNOSIS — Z23 NEED FOR PROPHYLACTIC VACCINATION AND INOCULATION AGAINST INFLUENZA: ICD-10-CM

## 2018-09-10 DIAGNOSIS — I10 ESSENTIAL HYPERTENSION WITH GOAL BLOOD PRESSURE LESS THAN 130/80: ICD-10-CM

## 2018-09-10 LAB — HBA1C MFR BLD: 9.9 % (ref 0–5.6)

## 2018-09-10 PROCEDURE — 36415 COLL VENOUS BLD VENIPUNCTURE: CPT | Performed by: FAMILY MEDICINE

## 2018-09-10 PROCEDURE — 90686 IIV4 VACC NO PRSV 0.5 ML IM: CPT | Performed by: FAMILY MEDICINE

## 2018-09-10 PROCEDURE — 90471 IMMUNIZATION ADMIN: CPT | Performed by: FAMILY MEDICINE

## 2018-09-10 PROCEDURE — 99214 OFFICE O/P EST MOD 30 MIN: CPT | Mod: 25 | Performed by: FAMILY MEDICINE

## 2018-09-10 PROCEDURE — 83036 HEMOGLOBIN GLYCOSYLATED A1C: CPT | Performed by: FAMILY MEDICINE

## 2018-09-10 RX ORDER — OXYCODONE AND ACETAMINOPHEN 7.5; 325 MG/1; MG/1
1-2 TABLET ORAL 3 TIMES DAILY PRN
Qty: 150 TABLET | Refills: 0 | Status: SHIPPED | OUTPATIENT
Start: 2018-09-21 | End: 2018-09-10

## 2018-09-10 RX ORDER — OXYCODONE AND ACETAMINOPHEN 7.5; 325 MG/1; MG/1
1-2 TABLET ORAL 3 TIMES DAILY PRN
Qty: 150 TABLET | Refills: 0 | Status: SHIPPED | OUTPATIENT
Start: 2018-10-21 | End: 2018-09-10

## 2018-09-10 RX ORDER — OXYCODONE AND ACETAMINOPHEN 7.5; 325 MG/1; MG/1
1-2 TABLET ORAL 3 TIMES DAILY PRN
Qty: 150 TABLET | Refills: 0 | Status: SHIPPED | OUTPATIENT
Start: 2018-11-21 | End: 2018-12-19

## 2018-09-10 NOTE — PROGRESS NOTES
SUBJECTIVE:   Xavier Mcguire is a 48 year old male who presents to clinic today for the following health issues:   Patient explained that he needed a certain brand of lancets, but does not recall the name.     Diabetes Follow-up      Patient is checking blood sugars: rarely.  Results range from low 176 to 200's    Diabetic concerns: blood sugar frequently over 200     Symptoms of hypoglycemia (low blood sugar): none     Paresthesias (numbness or burning in feet) or sores: No     Date of last diabetic eye exam: 2018    BP Readings from Last 2 Encounters:   06/06/18 134/86   02/28/18 140/86     Hemoglobin A1C (%)   Date Value   06/06/2018 10.2 (H)   12/04/2017 10.2 (H)     LDL Cholesterol Calculated (mg/dL)   Date Value   06/06/2018 32   09/06/2017 49       Diabetes Management Resources  Hypertension Follow-up      Outpatient blood pressures are not being checked.    Low Salt Diet: low salt    Chronic Pain Follow-Up       Type / Location of Pain: Both knees   Analgesia/pain control:       Recent changes:  worse      Overall control: Tolerable with discomfort  Activity level/function:      Daily activities:  Able to do moderate activities    Work:  Works part time with limitations   Adverse effects:  No  Adherance    Taking medication as directed?  Yes    Participating in other treatments: no   Risk Factors:    Sleep:  Fair    Mood/anxiety:  controlled    Recent family or social stressors:  none noted    Other aggravating factors: Being on knees  PHQ-9 SCORE 3/8/2017 12/4/2017 6/6/2018   Total Score - - -   Total Score 7 2 2     MIKE-7 SCORE 3/8/2017 12/4/2017 6/6/2018   Total Score - - -   Total Score 5 0 1     Encounter-Level CSA - 08/10/2015:          Controlled Substance Agreement - Scan on 8/12/2015  1:06 PM :  [place] CONTROLLED SUBSTANCE AGREEMENT (below)                Amount of exercise or physical activity: None    Problems taking medications regularly: No    Medication side effects: none    Diet:  "No salt added/Low salt     SUBJECTIVE:  Here today in follow-up on diabetes, hypertension, obesity.  Still admits that exercise and diet have not been as good as he hoped but he is down 15 pounds.  I congratulated him on this and hopefully it will mean improvement in his status.  Notes that blood pressures have been much better since starting Jardiance - typically 180's.  Previously they were well over 200.  Still having significant pain in his knees and now it is bilateral.  He has some known osteoarthritis of the right knee and had previously met with an orthopedic surgeon a number of years ago.  It is getting to the point that he would consider some type of surgical repair.  And now his left knee is becoming sore.  He would like to set up repeat MRI of his right knee and of his left knee and likely get plugged in with an orthopedic surgeon based upon these results.    Review of systems otherwise negative.  Past medical, family, and social history reviewed and updated in chart.    OBJECTIVE:  /80  Pulse 76  Temp 98.3  F (36.8  C) (Oral)  Resp 18  Ht 1.651 m (5' 5\")  Wt 103 kg (227 lb)  SpO2 95%  BMI 37.77 kg/m2  Alert, pleasant, upbeat, and in no apparent discomfort  Overweight obese  S1 and S2 normal, no murmurs, clicks, gallops or rubs. Regular rate and rhythm. Chest is clear; no wheezes or rales. No edema or JVD.  Knees -some chronic changes bilaterally with crepitance during range of motion  Past labs reviewed with the patient.     ASSESSMENT / PLAN:  (E11.65) Type 2 diabetes mellitus with hyperglycemia, without long-term current use of insulin (H)  (primary encounter diagnosis)  Comment: Hopefully will be some improvement in his overall status.  Is been very out of control for quite some time  Plan: Hemoglobin A1c            (E66.9) Obesity, Class II, BMI 35-39.9, with comorbidity  Comment: Weight has improved and I applauded his progress.  Continue same  Plan:     (I10) Essential hypertension " with goal blood pressure less than 130/80  Comment: Borderline control and hopefully continued lifestyle improvement will help  Plan:     (G89.4) Chronic pain syndrome  Comment: Stable and monitored through the  database.  Problem list up-to-date  Plan: oxyCODONE-acetaminophen (PERCOCET) 7.5-325 MG         per tablet, DISCONTINUED:         oxyCODONE-acetaminophen (PERCOCET) 7.5-325 MG         per tablet, DISCONTINUED:         oxyCODONE-acetaminophen (PERCOCET) 7.5-325 MG         per tablet            (M25.561,  G89.29) Chronic pain of right knee  Comment: We will set up an MRI of his knees and follow-up based upon these results  Plan: oxyCODONE-acetaminophen (PERCOCET) 7.5-325 MG         per tablet, MR Knee Right w/o Contrast,         DISCONTINUED: oxyCODONE-acetaminophen         (PERCOCET) 7.5-325 MG per tablet, DISCONTINUED:        oxyCODONE-acetaminophen (PERCOCET) 7.5-325 MG         per tablet            (M25.562) Left knee pain, unspecified chronicity  Comment:   Plan: MR Knee Left w/o Contrast            (Z23) Need for prophylactic vaccination and inoculation against influenza  Comment:   Plan: Vaccine Administration, Initial [56207], FLU         VACCINE, SPLIT VIRUS, IM (QUADRIVALENT)         [99050]- >3 YRS            Follow up based upon test results  STEW Terry MD    (Chart documentation completed in part with Dragon voice-recognition software.  Even though reviewed some grammatical, spelling, and word errors may remain.)       Injectable Influenza Immunization Documentation    1.  Is the person to be vaccinated sick today?   No    2. Does the person to be vaccinated have an allergy to a component   of the vaccine?   No  Egg Allergy Algorithm Link    3. Has the person to be vaccinated ever had a serious reaction   to influenza vaccine in the past?   No    4. Has the person to be vaccinated ever had Guillain-Barré syndrome?   No    Form completed by RADHA, MEDICAL ASSISTANT

## 2018-09-10 NOTE — MR AVS SNAPSHOT
After Visit Summary   9/10/2018    Xavier Mcguire    MRN: [id number]           Patient Information     Date Of Birth          1970        Visit Information        Provider Department      9/10/2018 6:40 PM Lizett Terry MD [facility]        Today's Diagnoses     Type 2 diabetes mellitus with hyperglycemia, without long-term current use of insulin (H)    -  1    Obesity, Class II, BMI 35-39.9, with comorbidity        Essential hypertension with goal blood pressure less than 130/80        Chronic pain syndrome        Chronic pain of right knee        Left knee pain, unspecified chronicity        Need for prophylactic vaccination and inoculation against influenza           Follow-ups after your visit        Follow-up notes from your care team     Return in about 3 months (around 12/10/2018) for Based upon test results.      Future tests that were ordered for you today     Open Future Orders        Priority Expected Expires Ordered    MR Knee Right w/o Contrast Routine  9/10/2019 9/10/2018    MR Knee Left w/o Contrast Routine  9/10/2019 9/10/2018            Who to contact     If you have questions or need follow up information about today's clinic visit or your schedule please contact [facility] directly at [phone].  Normal or non-critical lab and imaging results will be communicated to you by MyChart, letter or phone within 4 business days after the clinic has received the results. If you do not hear from us within 7 days, please contact the clinic through MyChart or phone. If you have a critical or abnormal lab result, we will notify you by phone as soon as possible.  Submit refill requests through [employer] or call your pharmacy and they will forward the refill request to us. Please allow 3 business days for your refill to be completed.          Additional Information About Your Visit        MyChart Information     [employer] gives you secure access  "to your electronic health record. If you see a primary care provider, you can also send messages to your care team and make appointments. If you have questions, please call your primary care clinic.  If you do not have a primary care provider, please call [phone] and they will assist you.        Care EveryWhere ID     This is your Care EveryWhere ID. This could be used by other organizations to access your [facility] medical records  [id number]        Your Vitals Were     Pulse Temperature Respirations Height Pulse Oximetry BMI (Body Mass Index)    76 98.3  F (36.8  C) (Oral) 18 1.651 m (5' 5\") 95% 37.77 kg/m2       Blood Pressure from Last 3 Encounters:   09/10/18 134/80   06/06/18 134/86   02/28/18 140/86    Weight from Last 3 Encounters:   09/10/18 103 kg (227 lb)   06/06/18 111.1 kg (245 lb)   02/28/18 110.5 kg (243 lb 9.6 oz)              We Performed the Following     FLU VACCINE, SPLIT VIRUS, IM (QUADRIVALENT) [13426]- >3 YRS     Hemoglobin A1c     Vaccine Administration, Initial [92876]          Today's Medication Changes          These changes are accurate as of 9/10/18 11:59 PM.  If you have any questions, ask your nurse or doctor.               Start taking these medicines.        Dose/Directions    oxyCODONE-acetaminophen 7.5-325 MG per tablet   Commonly known as:  PERCOCET   Used for:  Chronic pain of right knee, Chronic pain syndrome   Started by:  Lizett Terry MD        Dose:  1-2 tablet   Start taking on:  11/21/2018   Take 1-2 tablets by mouth 3 times daily as needed for pain   Quantity:  150 tablet   Refills:  0         Stop taking these medicines if you haven't already. Please contact your care team if you have questions.     ALPRAZolam 0.5 MG tablet   Commonly known as:  XANAX   Stopped by:  Lizett Terry MD                Where to get your medicines      Some of these will need a paper prescription and others can be bought over the counter.  Ask your nurse if you have " questions.     Bring a paper prescription for each of these medications     oxyCODONE-acetaminophen 7.5-325 MG per tablet               Information about OPIOIDS     PRESCRIPTION OPIOIDS: WHAT YOU NEED TO KNOW   We gave you an opioid (narcotic) pain medicine. It is important to manage your pain, but opioids are not always the best choice. You should first try all the other options your care team gave you. Take this medicine for as short a time (and as few doses) as possible.    Some activities can increase your pain, such as bandage changes or therapy sessions. It may help to take your pain medicine 30 to 60 minutes before these activities. Reduce your stress by getting enough sleep, working on hobbies you enjoy and practicing relaxation or meditation. Talk to your care team about ways to manage your pain beyond prescription opioids.    These medicines have risks:    DO NOT drive when on new or higher doses of pain medicine. These medicines can affect your alertness and reaction times, and you could be arrested for driving under the influence (DUI). If you need to use opioids long-term, talk to your care team about driving.    DO NOT operate heavy machinery    DO NOT do any other dangerous activities while taking these medicines.    DO NOT drink any alcohol while taking these medicines.     If the opioid prescribed includes acetaminophen, DO NOT take with any other medicines that contain acetaminophen. Read all labels carefully. Look for the word  acetaminophen  or  Tylenol.  Ask your pharmacist if you have questions or are unsure.    You can get addicted to pain medicines, especially if you have a history of addiction (chemical, alcohol or substance dependence). Talk to your care team about ways to reduce this risk.    All opioids tend to cause constipation. Drink plenty of water and eat foods that have a lot of fiber, such as fruits, vegetables, prune juice, apple juice and high-fiber cereal. Take a laxative  (Miralax, milk of magnesia, Colace, Senna) if you don t move your bowels at least every other day. Other side effects include upset stomach, sleepiness, dizziness, throwing up, tolerance (needing more of the medicine to have the same effect), physical dependence and slowed breathing.    Store your pills in a secure place, locked if possible. We will not replace any lost or stolen medicine. If you don t finish your medicine, please throw away (dispose) as directed by your pharmacist. The [place] Pollution Control Agency has more information about safe disposal: https://www.pca.[facility].[place].us/living-green/managing-unwanted-medications         Primary Care Provider Office Phone # Fax #    Lizett Pa Terry [phone] [address] [facility] [address]        Equal Access to Services     BALDEMAR SORIA : Hadii mary lopez hadasho Soomaali, waaxda luqadaha, qaybta kaalmada adedavisyada, jacobo wall . So [facility] [phone].    ATENCIÓN: Si habla español, tiene a hunt disposición servicios gratuitos de asistencia lingüística. Sherman al [phone].    We comply with applicable federal civil rights laws and [place] laws. We do not discriminate on the basis of race, color, national origin, age, disability, sex, sexual orientation, or gender identity.            Thank you!     Thank you for choosing [facility]  for your care. Our goal is always to provide you with excellent care. Hearing back from our patients is one way we can continue to improve our services. Please take a few minutes to complete the written survey that you may receive in the mail after your visit with us. Thank you!             Your Updated Medication List - Protect others around you: Learn how to safely use, store and throw away your medicines at www.disposemymeds.org.          This list is accurate as of 9/10/18 11:59 PM.  Always use your most recent med list.                   Brand  Name Dispense Instructions for use Diagnosis    ACE NOT PRESCRIBED (INTENTIONAL)      continuous prn for other ACE Inhibitor not prescribed due to Intolerance        albuterol 108 (90 Base) MCG/ACT inhaler    PROAIR HFA/PROVENTIL HFA/VENTOLIN HFA    1 Inhaler    Inhale 2 puffs into the lungs every 4 hours as needed for shortness of breath / dyspnea or wheezing    Acute bronchitis, unspecified organism       amLODIPine 10 MG tablet    NORVASC    90 tablet    Take 1 tablet (10 mg) by mouth daily    Essential hypertension with goal blood pressure less than 130/80       aspirin 81 MG tablet     100 tablet    Take 1 tablet by mouth daily.    Type 2 diabetes, HbA1c goal < 7% (H)       BONNY CONTOUR NEXT test strip   Generic drug:  blood glucose monitoring     300 strip    USE TO TEST BLOOD SUGARS 1-4 TIMES DAILY OR AS DIRECTED    Type 2 diabetes mellitus with hyperglycemia, without long-term current use of insulin (H)       chlorthalidone 25 MG tablet    HYGROTON    90 tablet    Take 1 tablet (25 mg) by mouth daily    Essential hypertension with goal blood pressure less than 130/80       empagliflozin 10 MG Tabs tablet    JARDIANCE    90 tablet    Take 1 tablet (10 mg) by mouth daily    Type 2 diabetes mellitus with hyperglycemia, without long-term current use of insulin (H)       fexofenadine 180 MG tablet    ALLEGRA    90 tablet    Take 1 tablet (180 mg) by mouth daily    Chronic rhinitis, unspecified type       glimepiride 4 MG tablet    AMARYL    180 tablet    Take 1 tablet (4 mg) by mouth 2 times daily    Type 2 diabetes mellitus with hyperglycemia, without long-term current use of insulin (H)       ipratropium - albuterol 0.5 mg/2.5 mg/3 mL 0.5-2.5 (3) MG/3ML neb solution    DUONEB    90 vial    Take 1 vial (3 mLs) by nebulization every 6 hours as needed for shortness of breath / dyspnea or wheezing    Acute bronchitis, unspecified organism       losartan 50 MG tablet    COZAAR    90 tablet    Take 1 tablet (50 mg)  by mouth daily    Essential hypertension with goal blood pressure less than 130/80       metFORMIN 1000 MG tablet    GLUCOPHAGE    180 tablet    Take 1 tablet (1,000 mg) by mouth 2 times daily (with meals) with breakfast and dinner.    Type 2 diabetes mellitus with hyperglycemia, without long-term current use of insulin (H)       ONE TOUCH DELICA LANCETS Misc     200 each    1 Device 2 times daily    Type 2 diabetes, HbA1c goal < 7% (H)       * order for DME     1 Device    Equipment being ordered: Nebulizer    Acute bronchitis, unspecified organism       * order for DME     1 Device    Equipment being ordered: Glucometer, per insurance plan Tests daily    Type 2 diabetes mellitus with hyperglycemia, without long-term current use of insulin (H)       * order for DME     100 Units    Equipment being ordered: Test strips and lancets - per device and insurance plan. Tests daily. Disp: 100 Refill: 3    Type 2 diabetes mellitus with hyperglycemia, without long-term current use of insulin (H)       * order for DME     1 Device    Equipment being ordered: glucometer - test daily    Type 2 diabetes mellitus with hyperglycemia, without long-term current use of insulin (H)       oxyCODONE-acetaminophen 7.5-325 MG per tablet   Start taking on:  11/21/2018    PERCOCET    150 tablet    Take 1-2 tablets by mouth 3 times daily as needed for pain    Chronic pain of right knee, Chronic pain syndrome       pioglitazone 45 MG tablet    ACTOS    90 tablet    Take 1 tablet (45 mg) by mouth daily    Type 2 diabetes mellitus with hyperglycemia, without long-term current use of insulin (H)       sildenafil 100 MG tablet    VIAGRA    12 tablet    Take 1 tablet (100 mg) by mouth daily as needed 30 min to 4 hrs before sex. Do not use with nitroglycerin, terazosin or doxazosin.    Erectile dysfunction, unspecified erectile dysfunction type       sildenafil 20 MG tablet    REVATIO    90 tablet    TAKE 5 TABLETS 30 MINUTES TO 4 HOURS PRIOR TO  SEX.    Erectile dysfunction, unspecified erectile dysfunction type       STATIN NOT PRESCRIBED (INTENTIONAL)      continuous prn for other Statin not prescribed intentionally due to Other low LDL  (This option does not exclude patient from measure)        * Notice:  This list has 4 medication(s) that are the same as other medications prescribed for you. Read the directions carefully, and ask your doctor or other care provider to review them with you.

## 2018-09-10 NOTE — PROGRESS NOTES

## 2018-09-17 NOTE — PROGRESS NOTES
"Well,  That sugar has, unfortunately, not dropped as low as we hoped.  In fact, it just hasn't changed much.  And I don't really know what to tell you at this point, other than the fact that this is very dangerous, and the changes that are happening to your organs is irreversible.  Once that damage is done there is no \"fixing\" it.  So part of me thinks it is time to try one of the new injectable drugs that seem to be really good.  But just like the drugs we are currently using, it only works if you actually take it.  So give this stuff some thought and let me know what you think.  STEW Terry M.D."

## 2018-11-19 ENCOUNTER — TELEPHONE (OUTPATIENT)
Dept: FAMILY MEDICINE | Facility: CLINIC | Age: 48
End: 2018-11-19

## 2018-11-19 DIAGNOSIS — E11.65 TYPE 2 DIABETES MELLITUS WITH HYPERGLYCEMIA, WITHOUT LONG-TERM CURRENT USE OF INSULIN (H): Primary | ICD-10-CM

## 2018-11-19 DIAGNOSIS — N52.9 ERECTILE DYSFUNCTION, UNSPECIFIED ERECTILE DYSFUNCTION TYPE: ICD-10-CM

## 2018-11-19 RX ORDER — LANCETS
EACH MISCELLANEOUS
Qty: 102 EACH | Refills: 6 | Status: SHIPPED | OUTPATIENT
Start: 2018-11-19 | End: 2019-06-12

## 2018-11-19 RX ORDER — LANCING DEVICE/LANCETS
KIT MISCELLANEOUS
Qty: 1 EACH | Refills: 0 | Status: SHIPPED | OUTPATIENT
Start: 2018-11-19 | End: 2019-06-12

## 2018-11-19 NOTE — TELEPHONE ENCOUNTER
Patient needs ACCUCHEK FAST CLIX lancets  Use [facility] on [address] [facility]  [phone]    Thank you    Call if questions [phone]   Ok to leave message

## 2018-11-19 NOTE — TELEPHONE ENCOUNTER
"Requested Prescriptions   Pending Prescriptions Disp Refills     sildenafil (REVATIO) 20 MG tablet [Pharmacy Med Name: SILDENAFIL 20MG TABLETS] 90 tablet 0     Sig: TAKE 5 TABLETS 30 MINUTES TO 4 HOURS PRIOR TO SEX.    Erectile Dysfuction Protocol Passed    11/19/2018  9:48 AM       Passed - Absence of nitrates on medication list       Passed - Absence of Alpha Blockers on Med list       Passed - Recent (12 mo) or future (30 days) visit within the authorizing provider's specialty    Patient had office visit in the last 12 months or has a visit in the next 30 days with authorizing provider or within the authorizing provider's specialty.  See \"Patient Info\" tab in inbasket, or \"Choose Columns\" in Meds & Orders section of the refill encounter.             Passed - Patient is age 18 or older        sildenafil (REVATIO) 20 MG tablet  Last Written Prescription Date:  7/23/18  Last Fill Quantity: 90,  # refills: 0   Last office visit: 9/10/2018 with prescribing provider:  Dr. Terry   Future Office Visit:   Next 5 appointments (look out 90 days)     Dec 19, 2018  8:20 AM CST   Office Visit with Lizett Terry MD   [facility]    [address] [place] [address] [phone]                   "

## 2018-11-19 NOTE — TELEPHONE ENCOUNTER
Requested Prescriptions   Signed Prescriptions Disp Refills     blood glucose (ACCU-CHEK FASTCLIX) lancing device 1 each 0     Sig: Lancing device to be used with lancets.    There is no refill protocol information for this order        blood glucose monitoring (ACCU-CHEK FASTCLIX) lancets 102 each 6     Sig: Use to test blood sugar 2 times daily.    There is no refill protocol information for this order          Prescription approved per [facility] Refill Protocol.      Lida Olsen RN, BSN

## 2018-11-21 RX ORDER — SILDENAFIL CITRATE 20 MG/1
TABLET ORAL
Qty: 90 TABLET | Refills: 0 | Status: SHIPPED | OUTPATIENT
Start: 2018-11-21 | End: 2019-04-02

## 2018-11-21 NOTE — TELEPHONE ENCOUNTER
Prescription approved per [facility] Refill Protocol.    Cris Lema RN, [facility]

## 2018-11-23 ENCOUNTER — TELEPHONE (OUTPATIENT)
Dept: FAMILY MEDICINE | Facility: CLINIC | Age: 48
End: 2018-11-23

## 2018-11-23 NOTE — TELEPHONE ENCOUNTER
Panel Management Review      Lab Results   Component Value Date    A1C 9.9 09/10/2018    A1C 10.2 06/06/2018     Last Office Visit with this department: 11/19/2018        Patient is due/failing the following:   A1C    Action needed:   Patient needs office visit for diabetes recheck with provider.    Type of outreach:    Phone, spoke to patient.  Patient already scheduled in Dec coming in for knee pain. DM will also be done at appointment.     [id number], MEDICAL ASSISTANT

## 2018-12-04 ENCOUNTER — OFFICE VISIT (OUTPATIENT)
Dept: FAMILY MEDICINE | Facility: CLINIC | Age: 48
End: 2018-12-04
Payer: COMMERCIAL

## 2018-12-04 VITALS
WEIGHT: 223 LBS | OXYGEN SATURATION: 95 % | TEMPERATURE: 98.7 F | HEIGHT: 65 IN | DIASTOLIC BLOOD PRESSURE: 80 MMHG | HEART RATE: 95 BPM | BODY MASS INDEX: 37.15 KG/M2 | SYSTOLIC BLOOD PRESSURE: 138 MMHG

## 2018-12-04 DIAGNOSIS — H66.003 ACUTE SUPPURATIVE OTITIS MEDIA OF BOTH EARS WITHOUT SPONTANEOUS RUPTURE OF TYMPANIC MEMBRANES, RECURRENCE NOT SPECIFIED: Primary | ICD-10-CM

## 2018-12-04 DIAGNOSIS — J02.9 PHARYNGITIS, UNSPECIFIED ETIOLOGY: ICD-10-CM

## 2018-12-04 DIAGNOSIS — R50.9 FEVER, UNSPECIFIED FEVER CAUSE: ICD-10-CM

## 2018-12-04 PROBLEM — E66.01 SEVERE OBESITY (BMI 35.0-39.9) WITH COMORBIDITY (H): Status: ACTIVE | Noted: 2017-03-08

## 2018-12-04 LAB
DEPRECATED S PYO AG THROAT QL EIA: NORMAL
SPECIMEN SOURCE: NORMAL

## 2018-12-04 PROCEDURE — 87880 STREP A ASSAY W/OPTIC: CPT | Performed by: FAMILY MEDICINE

## 2018-12-04 PROCEDURE — 87081 CULTURE SCREEN ONLY: CPT | Performed by: FAMILY MEDICINE

## 2018-12-04 PROCEDURE — 99214 OFFICE O/P EST MOD 30 MIN: CPT | Performed by: FAMILY MEDICINE

## 2018-12-04 RX ORDER — AMOXICILLIN 500 MG/1
500 CAPSULE ORAL 3 TIMES DAILY
Qty: 21 CAPSULE | Refills: 0 | Status: SHIPPED | OUTPATIENT
Start: 2018-12-04 | End: 2019-02-11

## 2018-12-04 RX ORDER — PENICILLIN V POTASSIUM 500 MG/1
500 TABLET, FILM COATED ORAL 3 TIMES DAILY
Qty: 30 TABLET | Refills: 0 | Status: CANCELLED | OUTPATIENT
Start: 2018-12-04 | End: 2018-12-14

## 2018-12-04 ASSESSMENT — PAIN SCALES - GENERAL: PAINLEVEL: MODERATE PAIN (5)

## 2018-12-04 NOTE — PATIENT INSTRUCTIONS
At [facility], we strive to deliver an exceptional experience to you, every time we see you.  If you receive a survey in the mail, please send us back your thoughts. We really do value your feedback.    Your care team:                            Family Medicine Internal Medicine   MD Andres Gonzalez MD Shantel Branch-Fleming, MD Katya Georgiev PA-C Megan Hill, APRN KATHY Garcia MD Pediatrics   Buddy Kaminski, JOSE MARIA Sanabria, MD Valencia Griffiths APRN CNP   MD Rizwana Graves MD Deborah Mielke, MD Cindy Mtz, APRN [facility]      Clinic hours: Monday - Thursday 7 am-7 pm; Fridays 7 am-5 pm.   Urgent care: Monday - Friday 11 am-9 pm; Saturday and Sunday 9 am-5 pm.  Pharmacy : Monday -Thursday 8 am-8 pm; Friday 8 am-6 pm; Saturday and Sunday 9 am-5 pm.     Clinic: [phone]   Pharmacy: [phone]

## 2018-12-04 NOTE — PROGRESS NOTES
"  SUBJECTIVE:   Xavier Mcguire is a 48 year old male who presents to clinic today for the following health issues:      ENT Symptoms             Symptoms: cc Present Absent Comment   Fever/Chills  x  102.1 today   Fatigue  x     Muscle Aches  x     Eye Irritation  x     Sneezing  x     Nasal Dwain/Drg  x     Sinus Pressure/Pain  x     Loss of smell  x     Dental pain   x    Sore Throat  x     Swollen Glands  x     Ear Pain/Fullness   x    Cough  x  Thick green mucus   Wheeze  x     Chest Pain   x    Shortness of breath  x     Rash   x    Other         Symptom duration:  about 2 days ago   Symptom severity:  moderate   Treatments tried:  IBU   Contacts:       Patient reports that he has been sneezing for about 3 weeks with some rhinorrhea, but yesterday was the first day with significant symptoms. He reports that this morning he had a fever of 102.1 before getting out of bed,  then increased to 104 after he was up and moving. He took ibuprofen and ate a piece of toast. He reports that his main symptoms at this time are nasal congestion and throat pain.    Patient also notes that he has been on allergy medication in the past, but has not been taking them recently.     Medications updated and reviewed.  Past, family and surgical history is updated and reviewed in the record.    ROS:  Other than noted above, general, HEENT, respiratory, cardiac and gastrointestinal systems are negative.    This document serves as a record of the services and decisions personally performed by MIKE BECK. It was created on his/her behalf by Rosa Isela Meyers, a trained medical scribe. The creation of this document is based on the provider's statements to the medical scribe. Rosa Isela Meyers, December 4, 2018 11:39 AM    OBJECTIVE:                                                    /80  Pulse 95  Temp 98.7  F (37.1  C) (Oral)  Ht 1.651 m (5' 5\")  Wt 101.2 kg (223 lb)  SpO2 95%  BMI 37.11 kg/m2   Body mass index is 37.11 " kg/(m^2).   GENERAL APPEARANCE ADULT: Alert, no acute distress, sniffing  EYES: PERRL, EOM normal, conjunctiva and lids normal  HENT: right TM erythematous, increased vascularity, left TM erythematous, increased vascularity, nose clear rhinorrhea, mucosal erythema, mucosal edema, frontal sinus tenderness no, maxillary sinus tenderness no, throat/mouth:moderate erythema, mucous membranes moist  NECK: anterior cervical nodes tender on the left side.  RESP: lungs clear to auscultation   CV: normal rate, regular rhythm, no murmur or gallop  SKIN: no suspicious lesions or rashes  NEURO: Alert, oriented, speech and mentation normal, Cranial nerves 2-12 are normal.  PSYCH: mentation appears normal., affect and mood normal    Diagnostic Test Results:  Results for orders placed or performed in visit on 12/04/18 (from the past 24 hour(s))   Strep, Rapid Screen   Result Value Ref Range    Specimen Description Throat     Rapid Strep A Screen       NEGATIVE: No Group A streptococcal antigen detected by immunoassay, await culture report.        ASSESSMENT/PLAN:                                                      (H66.003) Acute suppurative otitis media of both ears without spontaneous rupture of tympanic membranes, recurrence not specified  (primary encounter diagnosis)  Comment: mild, secondary to viral URI  Plan: amoxicillin (AMOXIL) 500 MG capsule            (J02.9) Pharyngitis, unspecified etiology  Comment: Viral  Plan: Strep, Rapid Screen, Beta strep group A culture            (R50.9) Fever, unspecified fever cause  Comment:   Plan: Follow up if cold symptoms have not resolved by 12/13/19    The information in this document, created by the medical scribe for me, accurately reflects the services I personally performed and the decisions made by me. I have reviewed and approved this document for accuracy prior to leaving the patient care area. December 4, 2018 11:36 AM     Rito Mejia MD

## 2018-12-04 NOTE — MR AVS SNAPSHOT
After Visit Summary   12/4/2018    Xavier Mcguire    MRN: [id number]           Patient Information     Date Of Birth          1970        Visit Information        Provider Department      12/4/2018 11:00 AM Rito Mejia MD [facility]        Today's Diagnoses     Acute suppurative otitis media of both ears without spontaneous rupture of tympanic membranes, recurrence not specified    -  1    Pharyngitis, unspecified etiology        Fever, unspecified fever cause          Care Instructions    At [facility], we strive to deliver an exceptional experience to you, every time we see you.  If you receive a survey in the mail, please send us back your thoughts. We really do value your feedback.    Your care team:                            Family Medicine Internal Medicine   MD Andres Gonzalez MD Shantel Branch-Fleming, MD Katya Georgiev PA-C Megan Hill, APRN [facility]    Ventura Garcia MD Pediatrics   Buddy Kaminski, PAStephanieC  Cuca Sanabria, CNP MD Valencia Van APRN CNP   MD Rizwana Graves MD Deborah Mielke, MD Kim Thein, APRN CNP      Clinic hours: Monday - Thursday 7 am-7 pm; Fridays 7 am-5 pm.   Urgent care: Monday - Friday 11 am-9 pm; Saturday and Sunday 9 am-5 pm.  Pharmacy : Monday -Thursday 8 am-8 pm; Friday 8 am-6 pm; Saturday and Sunday 9 am-5 pm.     Clinic: [phone]   Pharmacy: [phone]                Follow-ups after your visit        Follow-up notes from your care team     Return in 2 weeks (on 12/19/2018) for diabetes, recheck medications.      Your next 10 appointments already scheduled     Dec 19, 2018  8:20 AM CST   Office Visit with Lizett Terry MD   [facility]    [address] [place] [id number] [phone]           Bring a current list of meds and any records pertaining to this visit. For Physicals,  "please bring immunization records and any forms needing to be filled out. Please arrive 10 minutes early to complete paperwork.              Who to contact     If you have questions or need follow up information about today's clinic visit or your schedule please contact [facility] directly at [phone].  Normal or non-critical lab and imaging results will be communicated to you by MyChart, letter or phone within 4 business days after the clinic has received the results. If you do not hear from us within 7 days, please contact the clinic through [employer] or phone. If you have a critical or abnormal lab result, we will notify you by phone as soon as possible.  Submit refill requests through [employer] or call your pharmacy and they will forward the refill request to us. Please allow 3 business days for your refill to be completed.          Additional Information About Your Visit        MyChart Information     [employer] gives you secure access to your electronic health record. If you see a primary care provider, you can also send messages to your care team and make appointments. If you have questions, please call your primary care clinic.  If you do not have a primary care provider, please call [phone] and they will assist you.        Care EveryWhere ID     This is your Care EveryWhere ID. This could be used by other organizations to access your [facility] medical records  [id number]        Your Vitals Were     Pulse Temperature Height Pulse Oximetry BMI (Body Mass Index)       95 98.7  F (37.1  C) (Oral) 5' 5\" (1.651 m) 95% 37.11 kg/m2        Blood Pressure from Last 3 Encounters:   12/04/18 138/80   09/10/18 134/80   06/06/18 134/86    Weight from Last 3 Encounters:   12/04/18 223 lb (101.2 kg)   09/10/18 227 lb (103 kg)   06/06/18 245 lb (111.1 kg)              We Performed the Following     Beta strep group A culture     Strep, Rapid Screen          Today's Medication Changes          These " changes are accurate as of 12/4/18 11:55 AM.  If you have any questions, ask your nurse or doctor.               Start taking these medicines.        Dose/Directions    amoxicillin 500 MG capsule   Commonly known as:  AMOXIL   Used for:  Acute suppurative otitis media of both ears without spontaneous rupture of tympanic membranes, recurrence not specified   Started by:  Rito Mejia MD        Dose:  500 mg   Take 1 capsule (500 mg) by mouth 3 times daily for 7 days for ear infection   Quantity:  21 capsule   Refills:  0         Stop taking these medicines if you haven't already. Please contact your care team if you have questions.     sildenafil 100 MG tablet   Commonly known as:  VIAGRA   Stopped by:  Rito Mejia MD                Where to get your medicines      These medications were sent to [facility], [place] - [address] AT [facility] [address], [facility] [address]     Phone:  [phone]     amoxicillin 500 MG capsule                Primary Care Provider Office Phone # Fax #    Lizett Pa Terry [phone] [address] [facility] [address]        Equal Access to Services     BALDEMAR SORIA AH: Hadii aad ku hadasho Soomaali, waaxda luqadaha, qaybta kaalmada adeegyada, waxay idiin hayaugustan yung kharamarjan elias. So [facility] [phone].    ATENCIÓN: Si habla español, tiene a hunt disposición servicios gratuitos de asistencia lingüística. Sherman al [phone].    We comply with applicable federal civil rights laws and [place] laws. We do not discriminate on the basis of race, color, national origin, age, disability, sex, sexual orientation, or gender identity.            Thank you!     Thank you for choosing [facility]  for your care. Our goal is always to provide you with excellent care. Hearing back from our patients is one way we can continue to improve our services. Please take a few  minutes to complete the written survey that you may receive in the mail after your visit with us. Thank you!             Your Updated Medication List - Protect others around you: Learn how to safely use, store and throw away your medicines at www.disposemymeds.org.          This list is accurate as of 12/4/18 11:55 AM.  Always use your most recent med list.                   Brand Name Dispense Instructions for use Diagnosis    albuterol 108 (90 Base) MCG/ACT inhaler    PROAIR HFA/PROVENTIL HFA/VENTOLIN HFA    1 Inhaler    Inhale 2 puffs into the lungs every 4 hours as needed for shortness of breath / dyspnea or wheezing    Acute bronchitis, unspecified organism       amLODIPine 10 MG tablet    NORVASC    90 tablet    Take 1 tablet (10 mg) by mouth daily    Essential hypertension with goal blood pressure less than 130/80       amoxicillin 500 MG capsule    AMOXIL    21 capsule    Take 1 capsule (500 mg) by mouth 3 times daily for 7 days for ear infection    Acute suppurative otitis media of both ears without spontaneous rupture of tympanic membranes, recurrence not specified       aspirin 81 MG tablet    ASA    100 tablet    Take 1 tablet by mouth daily.    Type 2 diabetes, HbA1c goal < 7% (H)       BONNY CONTOUR NEXT test strip   Generic drug:  blood glucose monitoring     300 strip    USE TO TEST BLOOD SUGARS 1-4 TIMES DAILY OR AS DIRECTED    Type 2 diabetes mellitus with hyperglycemia, without long-term current use of insulin (H)       blood glucose lancing device     1 each    Lancing device to be used with lancets.    Type 2 diabetes mellitus with hyperglycemia, without long-term current use of insulin (H)       blood glucose monitoring lancets     102 each    Use to test blood sugar 2 times daily.    Type 2 diabetes mellitus with hyperglycemia, without long-term current use of insulin (H)       chlorthalidone 25 MG tablet    HYGROTON    90 tablet    Take 1 tablet (25 mg) by mouth daily    Essential  hypertension with goal blood pressure less than 130/80       empagliflozin 10 MG Tabs tablet    JARDIANCE    90 tablet    Take 1 tablet (10 mg) by mouth daily    Type 2 diabetes mellitus with hyperglycemia, without long-term current use of insulin (H)       fexofenadine 180 MG tablet    ALLEGRA    90 tablet    Take 1 tablet (180 mg) by mouth daily    Chronic rhinitis, unspecified type       glimepiride 4 MG tablet    AMARYL    180 tablet    Take 1 tablet (4 mg) by mouth 2 times daily    Type 2 diabetes mellitus with hyperglycemia, without long-term current use of insulin (H)       ipratropium - albuterol 0.5 mg/2.5 mg/3 mL 0.5-2.5 (3) MG/3ML neb solution    DUONEB    90 vial    Take 1 vial (3 mLs) by nebulization every 6 hours as needed for shortness of breath / dyspnea or wheezing    Acute bronchitis, unspecified organism       losartan 50 MG tablet    COZAAR    90 tablet    Take 1 tablet (50 mg) by mouth daily    Essential hypertension with goal blood pressure less than 130/80       metFORMIN 1000 MG tablet    GLUCOPHAGE    180 tablet    Take 1 tablet (1,000 mg) by mouth 2 times daily (with meals) with breakfast and dinner.    Type 2 diabetes mellitus with hyperglycemia, without long-term current use of insulin (H)       * order for DME     1 Device    Equipment being ordered: Nebulizer    Acute bronchitis, unspecified organism       * order for DME     1 Device    Equipment being ordered: glucometer - test daily    Type 2 diabetes mellitus with hyperglycemia, without long-term current use of insulin (H)       oxyCODONE-acetaminophen 7.5-325 MG per tablet    PERCOCET    150 tablet    Take 1-2 tablets by mouth 3 times daily as needed for pain    Chronic pain of right knee, Chronic pain syndrome       pioglitazone 45 MG tablet    ACTOS    90 tablet    Take 1 tablet (45 mg) by mouth daily    Type 2 diabetes mellitus with hyperglycemia, without long-term current use of insulin (H)       sildenafil 20 MG tablet     REVATIO    90 tablet    TAKE 5 TABLETS 30 MINUTES TO 4 HOURS PRIOR TO SEX.    Erectile dysfunction, unspecified erectile dysfunction type       STATIN NOT PRESCRIBED    INTENTIONAL     continuous prn for other Statin not prescribed intentionally due to Other low LDL  (This option does not exclude patient from measure)        * Notice:  This list has 2 medication(s) that are the same as other medications prescribed for you. Read the directions carefully, and ask your doctor or other care provider to review them with you.

## 2018-12-05 LAB
BACTERIA SPEC CULT: NORMAL
SPECIMEN SOURCE: NORMAL

## 2018-12-06 ASSESSMENT — PATIENT HEALTH QUESTIONNAIRE - PHQ9: SUM OF ALL RESPONSES TO PHQ QUESTIONS 1-9: 3

## 2018-12-19 ENCOUNTER — OFFICE VISIT (OUTPATIENT)
Dept: FAMILY MEDICINE | Facility: CLINIC | Age: 48
End: 2018-12-19
Payer: COMMERCIAL

## 2018-12-19 VITALS
DIASTOLIC BLOOD PRESSURE: 88 MMHG | WEIGHT: 223 LBS | BODY MASS INDEX: 37.15 KG/M2 | OXYGEN SATURATION: 99 % | HEIGHT: 65 IN | SYSTOLIC BLOOD PRESSURE: 128 MMHG | HEART RATE: 73 BPM | TEMPERATURE: 98.2 F

## 2018-12-19 DIAGNOSIS — I10 ESSENTIAL HYPERTENSION WITH GOAL BLOOD PRESSURE LESS THAN 130/80: ICD-10-CM

## 2018-12-19 DIAGNOSIS — G89.29 CHRONIC PAIN OF RIGHT KNEE: ICD-10-CM

## 2018-12-19 DIAGNOSIS — M25.561 CHRONIC PAIN OF RIGHT KNEE: ICD-10-CM

## 2018-12-19 DIAGNOSIS — E11.65 TYPE 2 DIABETES MELLITUS WITH HYPERGLYCEMIA, WITHOUT LONG-TERM CURRENT USE OF INSULIN (H): Primary | ICD-10-CM

## 2018-12-19 DIAGNOSIS — G89.4 CHRONIC PAIN SYNDROME: ICD-10-CM

## 2018-12-19 DIAGNOSIS — J30.9 ALLERGIC RHINITIS, UNSPECIFIED SEASONALITY, UNSPECIFIED TRIGGER: ICD-10-CM

## 2018-12-19 LAB — HBA1C MFR BLD: 9.2 % (ref 0–5.6)

## 2018-12-19 PROCEDURE — 36415 COLL VENOUS BLD VENIPUNCTURE: CPT | Performed by: FAMILY MEDICINE

## 2018-12-19 PROCEDURE — 83036 HEMOGLOBIN GLYCOSYLATED A1C: CPT | Performed by: FAMILY MEDICINE

## 2018-12-19 PROCEDURE — 99214 OFFICE O/P EST MOD 30 MIN: CPT | Performed by: FAMILY MEDICINE

## 2018-12-19 RX ORDER — GLIMEPIRIDE 4 MG/1
4 TABLET ORAL 2 TIMES DAILY
Qty: 180 TABLET | Refills: 1 | Status: SHIPPED | OUTPATIENT
Start: 2018-12-19 | End: 2019-06-12

## 2018-12-19 RX ORDER — PIOGLITAZONEHYDROCHLORIDE 45 MG/1
45 TABLET ORAL DAILY
Qty: 90 TABLET | Refills: 1 | Status: SHIPPED | OUTPATIENT
Start: 2018-12-19 | End: 2019-06-12

## 2018-12-19 RX ORDER — CHLORTHALIDONE 25 MG/1
25 TABLET ORAL DAILY
Qty: 90 TABLET | Refills: 1 | Status: SHIPPED | OUTPATIENT
Start: 2018-12-19 | End: 2019-06-12

## 2018-12-19 RX ORDER — OXYCODONE AND ACETAMINOPHEN 7.5; 325 MG/1; MG/1
1-2 TABLET ORAL 3 TIMES DAILY PRN
Qty: 150 TABLET | Refills: 0 | Status: SHIPPED | OUTPATIENT
Start: 2019-01-19 | End: 2018-12-19

## 2018-12-19 RX ORDER — OXYCODONE AND ACETAMINOPHEN 7.5; 325 MG/1; MG/1
1-2 TABLET ORAL 3 TIMES DAILY PRN
Qty: 150 TABLET | Refills: 0 | Status: SHIPPED | OUTPATIENT
Start: 2019-02-19 | End: 2019-03-15

## 2018-12-19 RX ORDER — OXYCODONE AND ACETAMINOPHEN 7.5; 325 MG/1; MG/1
1-2 TABLET ORAL 3 TIMES DAILY PRN
Qty: 150 TABLET | Refills: 0 | Status: SHIPPED | OUTPATIENT
Start: 2018-12-19 | End: 2018-12-19

## 2018-12-19 RX ORDER — AMLODIPINE BESYLATE 10 MG/1
10 TABLET ORAL DAILY
Qty: 90 TABLET | Refills: 1 | Status: SHIPPED | OUTPATIENT
Start: 2018-12-19 | End: 2019-06-12

## 2018-12-19 RX ORDER — FEXOFENADINE HCL 180 MG/1
180 TABLET ORAL DAILY
Qty: 90 TABLET | Refills: 1 | Status: SHIPPED | OUTPATIENT
Start: 2018-12-19 | End: 2019-06-12

## 2018-12-19 ASSESSMENT — PAIN SCALES - GENERAL: PAINLEVEL: MILD PAIN (3)

## 2018-12-19 ASSESSMENT — MIFFLIN-ST. JEOR: SCORE: 1803.4

## 2018-12-19 NOTE — PROGRESS NOTES
SUBJECTIVE:   Xavier Mcguire is a 48 year old male who presents to clinic today for the following health issues:      Diabetes Follow-up      Patient is checking blood sugars: not at all    Diabetic concerns: None     Symptoms of hypoglycemia (low blood sugar): none     Paresthesias (numbness or burning in feet) or sores: Yes on both hands      Date of last diabetic eye exam: August 2018    BP Readings from Last 2 Encounters:   12/04/18 138/80   09/10/18 134/80     Hemoglobin A1C (%)   Date Value   09/10/2018 9.9 (H)   06/06/2018 10.2 (H)     LDL Cholesterol Calculated (mg/dL)   Date Value   06/06/2018 32   09/06/2017 49       Diabetes Management Resources  Hypertension Follow-up      Outpatient blood pressures are not being checked.    Low Salt Diet: low salt    Chronic Pain Follow-Up       Type / Location of Pain: Both knees  Analgesia/pain control:       Recent changes:  same      Overall control: Tolerable with discomfort  Activity level/function:      Daily activities:  Able to do light housework, cooking    Work:  Able to work part time with limitations  Adverse effects:  No  Adherance    Taking medication as directed?  No- patient forgets top take medications    Participating in other treatments: no - not currently   Risk Factors:    Sleep:  Fair    Mood/anxiety:  controlled    Recent family or social stressors:  none noted    Other aggravating factors: prolonged standing and walking for long periods of time   PHQ-9 SCORE 12/4/2017 6/6/2018 12/6/2018   PHQ-9 Total Score - - -   PHQ-9 Total Score 2 2 3     MIKE-7 SCORE 3/8/2017 12/4/2017 6/6/2018   Total Score - - -   Total Score 5 0 1     Encounter-Level CSA - 08/10/2015:    Controlled Substance Agreement - Scan on 8/12/2015  1:06 PM: [facility] CONTROLLED SUBSTANCE AGREEMENT (below)       Patient-Level CSA:    There are no patient-level csa.         Amount of exercise or physical activity: None    Problems taking medications regularly:  "No    Medication side effects: none    Diet: low salt    SUBJECTIVE:  Here today in follow-up of diabetes, hypertension, chronic right knee pain.  He said he was really good about taking his medications for the first couple months but that is fallen off recently.  Unfortunately this is been the pattern for him.  We do not have his A1c under control but medication adjustments do not really do much if they are not taken.  So we had another discussion about the necessity of this to prevent long-term irreversible complications.  Says he is currently feeling fine other than some soreness to his fingers, specifically index fingers.    Review of systems otherwise negative.  Past medical, family, and social history reviewed and updated in chart.    OBJECTIVE:  /88 (BP Location: Right arm, Patient Position: Chair, Cuff Size: Adult Large)   Pulse 73   Temp 98.2  F (36.8  C) (Oral)   Ht 1.651 m (5' 5\")   Wt 101.2 kg (223 lb)   SpO2 99%   BMI 37.11 kg/m    Alert, pleasant, upbeat, and in no apparent discomfort.  S1 and S2 normal, no murmurs, clicks, gallops or rubs. Regular rate and rhythm. Chest is clear; no wheezes or rales. No edema or JVD.   FEET: both sides normal; no swelling, tenderness or skin or vascular lesions.  Sensation is intact. Peripheral pulses are palpable. Toenails are normal.   Hands -  No chronic arthritic changes and no obvious swelling or erythema  Past labs reviewed with the patient.    ASSESSMENT / PLAN:  (E11.65) Type 2 diabetes mellitus with hyperglycemia, without long-term current use of insulin (H)  (primary encounter diagnosis)  Comment: A1c not at goal.  We do have cholesterol and blood pressure under control.  But this is all an issue of compliance.  Discussion as above  Plan: Hemoglobin A1c, empagliflozin (JARDIANCE) 10 MG        TABS tablet, glimepiride (AMARYL) 4 MG tablet,         metFORMIN (GLUCOPHAGE) 1000 MG tablet,         pioglitazone (ACTOS) 45 MG tablet            (I10) " Essential hypertension with goal blood pressure less than 130/80  Comment: At goal on current medication.  Continue to work on lifestyle management  Plan: amLODIPine (NORVASC) 10 MG tablet,         chlorthalidone (HYGROTON) 25 MG tablet            (M25.561,  G89.29) Chronic pain of right knee  Comment: Stable and monitored.  Refilled x3  Plan: oxyCODONE-acetaminophen (PERCOCET) 7.5-325 MG         per tablet, DISCONTINUED:         oxyCODONE-acetaminophen (PERCOCET) 7.5-325 MG         per tablet, DISCONTINUED:         oxyCODONE-acetaminophen (PERCOCET) 7.5-325 MG         per tablet            (G89.4) Chronic pain syndrome  Comment: Updated CSA  Plan: oxyCODONE-acetaminophen (PERCOCET) 7.5-325 MG         per tablet, DISCONTINUED:         oxyCODONE-acetaminophen (PERCOCET) 7.5-325 MG         per tablet, DISCONTINUED:         oxyCODONE-acetaminophen (PERCOCET) 7.5-325 MG         per tablet            (J30.9) Allergic rhinitis, unspecified seasonality, unspecified trigger  Comment:   Plan: fexofenadine (ALLEGRA) 180 MG tablet            Follow up 3 months or based upon lab results  STEW Terry MD    (Chart documentation completed in part with Dragon voice-recognition software.  Even though reviewed some grammatical, spelling, and word errors may remain.)

## 2018-12-19 NOTE — RESULT ENCOUNTER NOTE
Hey,  A little better - that is good.  Keeps trending toward our goal of < 8.  Keep up the good work.  STEW Terry M.D.

## 2019-02-11 ENCOUNTER — OFFICE VISIT (OUTPATIENT)
Dept: FAMILY MEDICINE | Facility: CLINIC | Age: 49
End: 2019-02-11
Payer: COMMERCIAL

## 2019-02-11 VITALS
WEIGHT: 229.8 LBS | OXYGEN SATURATION: 98 % | HEART RATE: 79 BPM | HEIGHT: 65 IN | DIASTOLIC BLOOD PRESSURE: 74 MMHG | TEMPERATURE: 98.2 F | SYSTOLIC BLOOD PRESSURE: 138 MMHG | BODY MASS INDEX: 38.29 KG/M2

## 2019-02-11 DIAGNOSIS — E66.01 SEVERE OBESITY (BMI 35.0-39.9) WITH COMORBIDITY (H): ICD-10-CM

## 2019-02-11 DIAGNOSIS — I10 ESSENTIAL HYPERTENSION WITH GOAL BLOOD PRESSURE LESS THAN 130/80: ICD-10-CM

## 2019-02-11 DIAGNOSIS — J06.9 VIRAL UPPER RESPIRATORY TRACT INFECTION: ICD-10-CM

## 2019-02-11 DIAGNOSIS — E11.65 TYPE 2 DIABETES MELLITUS WITH HYPERGLYCEMIA, WITHOUT LONG-TERM CURRENT USE OF INSULIN (H): ICD-10-CM

## 2019-02-11 DIAGNOSIS — H10.33 ACUTE CONJUNCTIVITIS OF BOTH EYES, UNSPECIFIED ACUTE CONJUNCTIVITIS TYPE: Primary | ICD-10-CM

## 2019-02-11 PROCEDURE — 99214 OFFICE O/P EST MOD 30 MIN: CPT | Performed by: NURSE PRACTITIONER

## 2019-02-11 RX ORDER — POLYMYXIN B SULFATE AND TRIMETHOPRIM 1; 10000 MG/ML; [USP'U]/ML
1-2 SOLUTION OPHTHALMIC EVERY 4 HOURS
Qty: 6 ML | Refills: 0 | Status: SHIPPED | OUTPATIENT
Start: 2019-02-11 | End: 2019-02-21

## 2019-02-11 ASSESSMENT — MIFFLIN-ST. JEOR: SCORE: 1834.25

## 2019-02-11 NOTE — PROGRESS NOTES
SUBJECTIVE:   Xavier Mcguire is a 48 year old male who presents to clinic today for the following health issues:    RESPIRATORY SYMPTOMS      Duration: over a week    Description  Rhinorrhea- mostly clear/yellow, occasionally bloody, cough and sneezing    Severity: mild, thinks he's getting better but eyes are worse    Accompanying signs and symptoms: None    History (predisposing factors):  none    Precipitating or alleviating factors: None    Therapies tried and outcome:  none    Eye(s) Problem      Duration: a week    Description:  Location: right  Pain: YES  Redness: YES  Discharge: YES    Accompanying signs and symptoms: itch    History (Trauma, foreign body exposure,): None    Precipitating or alleviating factors (contact use): None    Therapies tried and outcome: neosporin      Diabetes Follow-up      Patient is checking blood sugars: not at all    Diabetic concerns: None     Symptoms of hypoglycemia (low blood sugar): none     Paresthesias (numbness or burning in feet) or sores: No     Date of last diabetic eye exam: 8/2018    BP Readings from Last 2 Encounters:   02/11/19 138/74   12/19/18 128/88     Hemoglobin A1C (%)   Date Value   12/19/2018 9.2 (H)   09/10/2018 9.9 (H)     LDL Cholesterol Calculated (mg/dL)   Date Value   06/06/2018 32   09/06/2017 49       Diabetes Management Resources  Hypertension Follow-up      Outpatient blood pressures are not being checked.    Low Salt Diet: not monitoring salt      Problem list and histories reviewed & adjusted, as indicated.  Additional history: as documented    Patient Active Problem List   Diagnosis     RAYMOND (obstructive sleep apnea)     CARDIOVASCULAR SCREENING; LDL GOAL LESS THAN 100     Erectile dysfunction, unspecified erectile dysfunction type     Chronic pain syndrome     Essential hypertension with goal blood pressure less than 130/80     Chronic pain of right knee     Type 2 diabetes mellitus with hyperglycemia, without long-term current use of  insulin (H)     Severe obesity (BMI 35.0-39.9) with comorbidity (H)     Allergic rhinitis, unspecified seasonality, unspecified trigger     History reviewed. No pertinent surgical history.    Social History     Tobacco Use     Smoking status: Never Smoker     Smokeless tobacco: Never Used   Substance Use Topics     Alcohol use: Yes     Alcohol/week: 2.0 oz     Types: 4 Standard drinks or equivalent per week     Comment: social - heavy drinker on some weekends     Family History   Problem Relation Age of Onset     Diabetes Maternal Grandmother      Cerebrovascular Disease Maternal Grandmother      Asthma Son      Cerebrovascular Disease Brother         age 18     Diabetes Mother      Cancer Father 63        dad with hip cancer. ? type      C.A.D. No family hx of      Hypertension No family hx of      Breast Cancer No family hx of      Cancer - colorectal No family hx of      Prostate Cancer No family hx of      Alcohol/Drug No family hx of      Allergies No family hx of          Current Outpatient Medications   Medication Sig Dispense Refill     albuterol (PROAIR HFA, PROVENTIL HFA, VENTOLIN HFA) 108 (90 BASE) MCG/ACT inhaler Inhale 2 puffs into the lungs every 4 hours as needed for shortness of breath / dyspnea or wheezing 1 Inhaler 3     amLODIPine (NORVASC) 10 MG tablet Take 1 tablet (10 mg) by mouth daily 90 tablet 1     aspirin 81 MG tablet Take 1 tablet by mouth daily. 100 tablet 3     BONNY CONTOUR NEXT test strip USE TO TEST BLOOD SUGARS 1-4 TIMES DAILY OR AS DIRECTED 300 strip 1     blood glucose (ACCU-CHEK FASTCLIX) lancing device Lancing device to be used with lancets. 1 each 0     blood glucose monitoring (ACCU-CHEK FASTCLIX) lancets Use to test blood sugar 2 times daily. 102 each 6     chlorthalidone (HYGROTON) 25 MG tablet Take 1 tablet (25 mg) by mouth daily 90 tablet 1     empagliflozin (JARDIANCE) 10 MG TABS tablet Take 1 tablet (10 mg) by mouth daily 90 tablet 1     fexofenadine (ALLEGRA) 180 MG  "tablet Take 1 tablet (180 mg) by mouth daily 90 tablet 1     glimepiride (AMARYL) 4 MG tablet Take 1 tablet (4 mg) by mouth 2 times daily 180 tablet 1     ipratropium - albuterol 0.5 mg/2.5 mg/3 mL (DUONEB) 0.5-2.5 (3) MG/3ML nebulization Take 1 vial (3 mLs) by nebulization every 6 hours as needed for shortness of breath / dyspnea or wheezing 90 vial 0     losartan (COZAAR) 50 MG tablet Take 1 tablet (50 mg) by mouth daily 90 tablet 1     metFORMIN (GLUCOPHAGE) 1000 MG tablet Take 1 tablet (1,000 mg) by mouth 2 times daily (with meals) with breakfast and dinner. 180 tablet 1     order for DME Equipment being ordered: glucometer - test daily 1 Device 0     order for DME Equipment being ordered: Nebulizer 1 Device 1     [START ON 2/19/2019] oxyCODONE-acetaminophen (PERCOCET) 7.5-325 MG per tablet Take 1-2 tablets by mouth 3 times daily as needed for pain 150 tablet 0     pioglitazone (ACTOS) 45 MG tablet Take 1 tablet (45 mg) by mouth daily 90 tablet 1     sildenafil (REVATIO) 20 MG tablet TAKE 5 TABLETS 30 MINUTES TO 4 HOURS PRIOR TO SEX. 90 tablet 0     STATIN NOT PRESCRIBED, INTENTIONAL, continuous prn for other Statin not prescribed intentionally due to Other low LDL  (This option does not exclude patient from measure)  0     BP Readings from Last 3 Encounters:   02/11/19 138/74   12/19/18 128/88   12/04/18 138/80    Wt Readings from Last 3 Encounters:   02/11/19 104.2 kg (229 lb 12.8 oz)   12/19/18 101.2 kg (223 lb)   12/04/18 101.2 kg (223 lb)                    Reviewed and updated as needed this visit by clinical staff  Tobacco  Allergies  Meds  Med Hx  Surg Hx  Fam Hx  Soc Hx      Reviewed and updated as needed this visit by Provider         ROS:  Constitutional, HEENT, cardiovascular, pulmonary, gi and gu systems are negative, except as otherwise noted.    OBJECTIVE:     /74   Pulse 79   Temp 98.2  F (36.8  C) (Tympanic)   Ht 1.651 m (5' 5\")   Wt 104.2 kg (229 lb 12.8 oz)   SpO2 98%   BMI " "38.24 kg/m    Body mass index is 38.24 kg/m .  GENERAL: healthy, alert and no distress  EYES: PERRL, EOMI and conjunctiva/corneas- conjunctival injection OU  HENT: ear canals and TM's normal, nose and mouth without ulcers or lesions  NECK: no adenopathy, no asymmetry, masses, or scars and thyroid normal to palpation  RESP: lungs clear to auscultation - no rales, rhonchi or wheezes  CV: regular rate and rhythm, normal S1 S2, no S3 or S4, no murmur, click or rub, no peripheral edema and peripheral pulses strong  ABDOMEN: soft, nontender, no hepatosplenomegaly, no masses and bowel sounds normal  MS: no gross musculoskeletal defects noted, no edema  SKIN: no suspicious lesions or rashes  NEURO: Normal strength and tone, mentation intact and speech normal  PSYCH: mentation appears normal, affect normal/bright  LYMPH: no cervical adenopathy    Diagnostic Test Results:  none     ASSESSMENT/PLAN:         BMI:   Estimated body mass index is 38.24 kg/m  as calculated from the following:    Height as of this encounter: 1.651 m (5' 5\").    Weight as of this encounter: 104.2 kg (229 lb 12.8 oz).   Weight management plan: Discussed healthy diet and exercise guidelines      1. Acute conjunctivitis of both eyes, unspecified acute conjunctivitis type  Treating with Polytrim gtts, home care reviewed, return to clinic if not improved, new, or worsening symptoms.   - trimethoprim-polymyxin b (POLYTRIM) 66008-3.1 UNIT/ML-% ophthalmic solution; Place 1-2 drops into both eyes every 4 hours for 10 days  Dispense: 6 mL; Refill: 0    2. Viral upper respiratory tract infection  NON PRESCRIPTION TREATMENT    Mucinex 600 mg 2 tabs bid  Increase humidity to 30-40% in bedroom at night - vaporizer  Avoid decongestant  Saline nasal spray as needed  Increase fluid intake  Benadryl 25mg 1/2 - 1 hour before bed time  Maintain 8 hr minimum of sleep at night  Robitussin DM cough gels for cough    3. Type 2 diabetes mellitus with hyperglycemia, without " long-term current use of insulin (H)  Poorly controlled diabetes.  He'll follow back with PCM but we discussed increasing his Jardiance to 25 mg daily or adding Victoza or Trulicity.  Benefits of weight loss discussed at length, also encouraged him to check his sugars at least daily.    4. Severe obesity (BMI 35.0-39.9) with comorbidity (H)  Benefits of weight loss reviewed in detail, encouraged him to cut back on the carbohydrates in the diet, consume more fruits and vegetables, drink plenty of water, avoid fruit juices, sodas, get 150 min moderate exercise/week.  Offered CDE or Nutrition referral but he declined this- admits to poor diet . He does not cook and states his wife does not pay attention to salt in the diet and he admits to consuming more arbs tahn he ought. Recheck weight in 2 months.      5. Essential hypertension with goal blood pressure less than 130/80  BP OK,  Continue current management.  Advised cutting the salt in the diet, cotninued weight loss efforts, regular exercise.      Work on weight loss  Regular exercise  See Patient Instructions    NAMRATA [facility]

## 2019-02-11 NOTE — PATIENT INSTRUCTIONS
At [facility], we strive to deliver an exceptional experience to you, every time we see you.  If you receive a survey in the mail, please send us back your thoughts. We really do value your feedback.    Your care team:                            Family Medicine Internal Medicine   MD Andres Gonzalez MD Shantel Branch-Fleming, MD Katya Georgiev PA-C Megan Hill, APRN CNP    Ventura Garcia, MD Pediatrics   Buddy Kaminski, JOSE MARIA Sanabria, MD Valencia Griffiths APRN CNP   MD Rizwana Graves MD Deborah Mielke, MD Cindy Mtz, APRN [facility]      Clinic hours: Monday - Thursday 7 am-7 pm; Fridays 7 am-5 pm.   Urgent care: Monday - Friday 11 am-9 pm; Saturday and Sunday 9 am-5 pm.  Pharmacy : Monday -Thursday 8 am-8 pm; Friday 8 am-6 pm; Saturday and Sunday 9 am-5 pm.     Clinic: [phone]   Pharmacy: [phone]        Patient Education     Conjunctivitis, Nonspecific    The membrane that covers the white part of your eye (the conjunctiva) is inflamed. Inflammation happens when your body responds to an injury, allergic reaction, infection, or illness. Symptoms of inflammation in the eye may include redness, irritation, itching, swelling, or burning. These symptoms should go away within the next 24 hours. Conjunctivitis may be related to a particle that was in your eye. If so, it may wash out with your tears or irrigation treatment. Being exposed to liquid chemicals or fumes may also cause this reaction.   Home care    Apply a cold pack over the eye for 20 minutes at a time. This will reduce pain. To make a cold pack, put ice cubes in a plastic bag that seals at the top. Wrap the bag in a clean, thin towel or cloth.    Artificial tears may be prescribed to reduce irritation or redness.  These should be used 3 to 4 times a day.    You may use acetaminophen or ibuprofen to control pain, unless another medicine was prescribed. (Note: If you have chronic  liver or kidney disease, or if you have ever had a stomach ulcer or gastrointestinal bleeding, talk with your healthcare provider before using these medicines.)  Follow-up care  Follow up with your healthcare provider, or as advised.  When to seek medical advice  Call your healthcare provider right away if any of these occur:    Increased eyelid swelling    Increased eye pain    Increased redness or drainage from the eye    Increased blurry vision or increased sensitivity to light    Failure of normal vision to return within 24 to 48 hours  Date Last Reviewed: 7/1/2017 2000-2018 [employer]. [address] [place] [address]. All rights reserved. This information is not intended as a substitute for professional medical care. Always follow your healthcare professional's instructions.           Patient Education     Viral Upper Respiratory Illness (Adult)  You have a viral upper respiratory illness (URI), which is another term for the common cold. This illness is contagious during the first few days. It is spread through the air by coughing and sneezing. It may also be spread by direct contact (touching the sick person and then touching your own eyes, nose, or mouth). Frequent handwashing will decrease risk of spread. Most viral illnesses go away within 7 to 10 days with rest and simple home remedies. Sometimes the illness may last for several weeks. Antibiotics will not kill a virus, and they are generally not prescribed for this condition.    Home care    If symptoms are severe, rest at home for the first 2 to 3 days. When you resume activity, don't let yourself get too tired.    Don't smoke. If you need help stopping, talk with your healthcare provider.    Avoid being exposed to cigarette smoke (yours or others ).    You may use acetaminophen or ibuprofen to control pain and fever, unless another medicine was prescribed. If you have chronic liver or kidney disease, have ever had a stomach  ulcer or gastrointestinal bleeding, or are taking blood-thinning medicines, talk with your healthcare provider before using these medicines. Aspirin should never be given to anyone under 18 years of age who is ill with a viral infection or fever. It may cause severe liver or brain damage.    Your appetite may be poor, so a light diet is fine. Stay well hydrated by drinking 6 to 8 glasses of fluids per day (water, soft drinks, juices, tea, or soup). Extra fluids will help loosen secretions in the nose and lungs.    Over-the-counter cold medicines will not shorten the length of time you re sick, but they may be helpful for the following symptoms: cough, sore throat, and nasal and sinus congestion. If you take prescription medicines, ask your healthcare provider or pharmacist which over-the-counter medicines are safe to use. (Note: Don't use decongestants if you have high blood pressure.)  Follow-up care  Follow up with your healthcare provider, or as advised.  When to seek medical advice  Call your healthcare provider right away if any of these occur:    Cough with lots of colored sputum (mucus)    Severe headache; face, neck, or ear pain    Difficulty swallowing due to throat pain    Fever of 100.4 F (38 C) or higher, or as directed by your healthcare provider  Call 911  Call 911 if any of these occur:    Chest pain, shortness of breath, wheezing, or difficulty breathing    Coughing up blood    Very severe pain with swallowing, especially if it goes along with a muffled voice   Date Last Reviewed: 6/1/2018 2000-2018 [employer]. [address] [place] [address], [place] [address]. All rights reserved. This information is not intended as a substitute for professional medical care. Always follow your healthcare professional's instructions.

## 2019-02-11 NOTE — PROGRESS NOTES
"  SUBJECTIVE:   Xavier Mcguire is a 48 year old male who presents to clinic today for the following health issues:    RESPIRATORY SYMPTOMS      Duration: ***    Description  { :286938}    Severity: {MILD, MODERATE, SEVERE LOW:556572}    Accompanying signs and symptoms: right eye concern    History (predisposing factors):  { :489554::\"none\"}    Precipitating or alleviating factors: {NONE DEFAULTED:308874::\"None\"}    Therapies tried and outcome:  { :087456::\"none\"}      {additional problems for provider to add:766223}    Problem list and histories reviewed & adjusted, as indicated.  Additional history: {NONE - AS DOCUMENTED:164359::\"as documented\"}    {HIST REVIEW/ LINKS 2:079353}    Reviewed and updated as needed this visit by clinical staff       Reviewed and updated as needed this visit by Provider         {PROVIDER CHARTING PREFERENCE:523997}  "

## 2019-03-15 ENCOUNTER — OFFICE VISIT (OUTPATIENT)
Dept: FAMILY MEDICINE | Facility: CLINIC | Age: 49
End: 2019-03-15
Payer: COMMERCIAL

## 2019-03-15 VITALS
TEMPERATURE: 98.2 F | HEIGHT: 65 IN | OXYGEN SATURATION: 98 % | WEIGHT: 226 LBS | SYSTOLIC BLOOD PRESSURE: 134 MMHG | BODY MASS INDEX: 37.65 KG/M2 | HEART RATE: 71 BPM | DIASTOLIC BLOOD PRESSURE: 78 MMHG

## 2019-03-15 DIAGNOSIS — I10 ESSENTIAL HYPERTENSION WITH GOAL BLOOD PRESSURE LESS THAN 130/80: ICD-10-CM

## 2019-03-15 DIAGNOSIS — G89.4 CHRONIC PAIN SYNDROME: ICD-10-CM

## 2019-03-15 DIAGNOSIS — E66.01 SEVERE OBESITY (BMI 35.0-39.9) WITH COMORBIDITY (H): ICD-10-CM

## 2019-03-15 DIAGNOSIS — E11.65 TYPE 2 DIABETES MELLITUS WITH HYPERGLYCEMIA, WITHOUT LONG-TERM CURRENT USE OF INSULIN (H): Primary | ICD-10-CM

## 2019-03-15 DIAGNOSIS — G89.29 CHRONIC PAIN OF RIGHT KNEE: ICD-10-CM

## 2019-03-15 DIAGNOSIS — M25.561 CHRONIC PAIN OF RIGHT KNEE: ICD-10-CM

## 2019-03-15 LAB
ALBUMIN SERPL-MCNC: 3.9 G/DL (ref 3.4–5)
ALP SERPL-CCNC: 71 U/L (ref 40–150)
ALT SERPL W P-5'-P-CCNC: 24 U/L (ref 0–70)
ANION GAP SERPL CALCULATED.3IONS-SCNC: 7 MMOL/L (ref 3–14)
AST SERPL W P-5'-P-CCNC: 18 U/L (ref 0–45)
BILIRUB SERPL-MCNC: 0.3 MG/DL (ref 0.2–1.3)
BUN SERPL-MCNC: 17 MG/DL (ref 7–30)
CALCIUM SERPL-MCNC: 9.9 MG/DL (ref 8.5–10.1)
CHLORIDE SERPL-SCNC: 101 MMOL/L (ref 94–109)
CO2 SERPL-SCNC: 31 MMOL/L (ref 20–32)
CREAT SERPL-MCNC: 1.15 MG/DL (ref 0.66–1.25)
CREAT UR-MCNC: 101 MG/DL
GFR SERPL CREATININE-BSD FRML MDRD: 74 ML/MIN/{1.73_M2}
GLUCOSE SERPL-MCNC: 174 MG/DL (ref 70–99)
HBA1C MFR BLD: 8 % (ref 0–5.6)
HDLC SERPL-MCNC: 42 MG/DL
LDLC SERPL DIRECT ASSAY-MCNC: 74 MG/DL
MICROALBUMIN UR-MCNC: <5 MG/L
MICROALBUMIN/CREAT UR: NORMAL MG/G CR (ref 0–17)
POTASSIUM SERPL-SCNC: 4.1 MMOL/L (ref 3.4–5.3)
PROT SERPL-MCNC: 7.5 G/DL (ref 6.8–8.8)
SODIUM SERPL-SCNC: 139 MMOL/L (ref 133–144)

## 2019-03-15 PROCEDURE — 80053 COMPREHEN METABOLIC PANEL: CPT | Performed by: FAMILY MEDICINE

## 2019-03-15 PROCEDURE — 83721 ASSAY OF BLOOD LIPOPROTEIN: CPT | Performed by: FAMILY MEDICINE

## 2019-03-15 PROCEDURE — 99214 OFFICE O/P EST MOD 30 MIN: CPT | Performed by: FAMILY MEDICINE

## 2019-03-15 PROCEDURE — 83036 HEMOGLOBIN GLYCOSYLATED A1C: CPT | Performed by: FAMILY MEDICINE

## 2019-03-15 PROCEDURE — 83718 ASSAY OF LIPOPROTEIN: CPT | Performed by: FAMILY MEDICINE

## 2019-03-15 PROCEDURE — 36415 COLL VENOUS BLD VENIPUNCTURE: CPT | Performed by: FAMILY MEDICINE

## 2019-03-15 PROCEDURE — 82043 UR ALBUMIN QUANTITATIVE: CPT | Performed by: FAMILY MEDICINE

## 2019-03-15 RX ORDER — OXYCODONE AND ACETAMINOPHEN 7.5; 325 MG/1; MG/1
1-2 TABLET ORAL 3 TIMES DAILY PRN
Qty: 150 TABLET | Refills: 0 | Status: SHIPPED | OUTPATIENT
Start: 2019-03-15 | End: 2019-03-15

## 2019-03-15 RX ORDER — LOSARTAN POTASSIUM 50 MG/1
50 TABLET ORAL DAILY
Qty: 90 TABLET | Refills: 1 | Status: SHIPPED | OUTPATIENT
Start: 2019-03-15 | End: 2019-08-21

## 2019-03-15 RX ORDER — OXYCODONE AND ACETAMINOPHEN 7.5; 325 MG/1; MG/1
1-2 TABLET ORAL 3 TIMES DAILY PRN
Qty: 150 TABLET | Refills: 0 | Status: SHIPPED | OUTPATIENT
Start: 2019-04-15 | End: 2019-03-15

## 2019-03-15 RX ORDER — OXYCODONE AND ACETAMINOPHEN 7.5; 325 MG/1; MG/1
1-2 TABLET ORAL 3 TIMES DAILY PRN
Qty: 150 TABLET | Refills: 0 | Status: SHIPPED | OUTPATIENT
Start: 2019-05-15 | End: 2019-06-12

## 2019-03-15 ASSESSMENT — MIFFLIN-ST. JEOR: SCORE: 1817.01

## 2019-03-15 NOTE — PROGRESS NOTES
SUBJECTIVE:   Xavier Mcguire is a 48 year old male who presents to clinic today for the following health issues:      Diabetes Follow-up      Patient is checking blood sugars: not at all    Diabetic concerns: None     Symptoms of hypoglycemia (low blood sugar): none     Paresthesias (numbness or burning in feet) or sores: No     Date of last diabetic eye exam: May 2018    BP Readings from Last 2 Encounters:   02/11/19 138/74   12/19/18 128/88     Hemoglobin A1C (%)   Date Value   12/19/2018 9.2 (H)   09/10/2018 9.9 (H)     LDL Cholesterol Calculated (mg/dL)   Date Value   06/06/2018 32   09/06/2017 49       Diabetes Management Resources  Hypertension Follow-up      Outpatient blood pressures are not being checked.    Low Salt Diet: no added salt    Chronic Pain Follow-Up       Type / Location of Pain: Both knees  Analgesia/pain control:       Recent changes:  same      Overall control: Tolerable with discomfort  Activity level/function:      Daily activities:  Able to do light housework, cooking    Work:  Able to work part time with limitations  Adverse effects:  No  Adherance    Taking medication as directed?  Yes    Participating in other treatments: no - not currently   Risk Factors:    Sleep:  Fair    Mood/anxiety:  controlled    Recent family or social stressors:  none noted    Other aggravating factors: When on knees  PHQ-9 SCORE 12/4/2017 6/6/2018 12/6/2018   PHQ-9 Total Score - - -   PHQ-9 Total Score 2 2 3     MIKE-7 SCORE 3/8/2017 12/4/2017 6/6/2018   Total Score - - -   Total Score 5 0 1     Encounter-Level CSA - 08/10/2015:    Controlled Substance Agreement - Scan on 8/12/2015  1:06 PM: [facility] CONTROLLED SUBSTANCE AGREEMENT (below)       Patient-Level CSA:    Controlled Substance Agreement - Opioid - Scan on 12/19/2018  1:21 PM (below)           Amount of exercise or physical activity: 2-3 days/week for an average of 15-30 minutes    Problems taking medications regularly: No    Medication  "side effects: none    Diet: regular (no restrictions)    SUBJECTIVE:  Here today in follow-up of diabetes, obesity, hypertension.  As per usual admits that diet and exercise have not been optimal.  Weight is down a little bit but not significantly.  Admits to drinking sugary pop, etc. chronic knee pain still an ongoing issue.  We have monitored his usage of the pain medication through the  database and CSA / UDS.      Review of systems otherwise negative.  Past medical, family, and social history reviewed and updated in chart.    OBJECTIVE:  /78 (BP Location: Right arm, Patient Position: Chair, Cuff Size: Adult Large)   Pulse 71   Temp 98.2  F (36.8  C) (Oral)   Ht 1.651 m (5' 5\")   Wt 102.5 kg (226 lb)   SpO2 98%   BMI 37.61 kg/m    Alert, pleasant, upbeat, and in no apparent discomfort.  Obese  S1 and S2 normal, no murmurs, clicks, gallops or rubs. Regular rate and rhythm. Chest is clear; no wheezes or rales. No edema or JVD.  Past labs reviewed with the patient.     ASSESSMENT / PLAN:  (E11.65) Type 2 diabetes mellitus with hyperglycemia, without long-term current use of insulin (H)  (primary encounter diagnosis)  Comment: Recheck and adjust as needed.  Likely still fairly high and patient has declined injectable therapy in the past  Plan: Hemoglobin A1c, Albumin Random Urine         Quantitative with Creat Ratio, Comprehensive         metabolic panel, LDL cholesterol direct, HDL         cholesterol            (E66.01) Severe obesity (BMI 35.0-39.9) with comorbidity (H)  Comment: Lifestyle is entirely up to him.  Discussed again  Plan:     (I10) Essential hypertension with goal blood pressure less than 130/80  Comment: At goal.  Continue same therapy  Plan: losartan (COZAAR) 50 MG tablet            (M25.561,  G89.29) Chronic pain of right knee  Comment: Stable and monitored.  Refilled x3  Plan: oxyCODONE-acetaminophen (PERCOCET) 7.5-325 MG         per tablet, DISCONTINUED:         " oxyCODONE-acetaminophen (PERCOCET) 7.5-325 MG         per tablet, DISCONTINUED:         oxyCODONE-acetaminophen (PERCOCET) 7.5-325 MG         per tablet            (G89.4) Chronic pain syndrome  Comment:   Plan: Comprehen Drug Analysis UR,         oxyCODONE-acetaminophen (PERCOCET) 7.5-325 MG         per tablet, DISCONTINUED:         oxyCODONE-acetaminophen (PERCOCET) 7.5-325 MG         per tablet, DISCONTINUED:         oxyCODONE-acetaminophen (PERCOCET) 7.5-325 MG         per tablet            Follow up 3 months  STEW Terry MD    (Chart documentation completed in part with Dragon voice-recognition software.  Even though reviewed some grammatical, spelling, and word errors may remain.)

## 2019-03-18 NOTE — RESULT ENCOUNTER NOTE
Hey, that sugar has actually improved.  Our goal is less than 8, so with a little more effort and routine taking of the medication, things should continue to improve.  STEW Terry M.D.

## 2019-03-26 ENCOUNTER — TELEPHONE (OUTPATIENT)
Dept: FAMILY MEDICINE | Facility: CLINIC | Age: 49
End: 2019-03-26

## 2019-03-26 NOTE — TELEPHONE ENCOUNTER
Reason for Call:  Other     Detailed comments: patient seen for right knee pain and asking how he would schedule a cortisone injection?    Phone Number Patient can be reached at: Home number on file [phone] (home)    Best Time: any    Can we leave a detailed message on this number? YES    Call taken on 3/26/2019 at 3:47 PM by Priscilla Pedraza

## 2019-04-02 DIAGNOSIS — N52.9 ERECTILE DYSFUNCTION, UNSPECIFIED ERECTILE DYSFUNCTION TYPE: ICD-10-CM

## 2019-04-02 NOTE — TELEPHONE ENCOUNTER
"Requested Prescriptions   Pending Prescriptions Disp Refills     sildenafil (REVATIO) 20 MG tablet [Pharmacy Med Name: SILDENAFIL 20MG TABLETS]  Last Written Prescription Date:  11/21/18  Last Fill Quantity: 90 tablet,  # refills: 0   Last office visit: 3/15/2019 with prescribing provider:  Dr. Terry   Future Office Visit:   Next 5 appointments (look out 90 days)    Apr 05, 2019  9:20 AM CDT  Office Visit with Lizett Terry MD  [facility] ([address] [id number] [phone]          90 tablet 0     Sig: TAKE 5 TABLETS 30 MINUTES TO 4 HOURS PRIOR TO SEX.    Erectile Dysfuction Protocol Passed - 4/2/2019  9:29 AM       Passed - Absence of nitrates on medication list       Passed - Absence of Alpha Blockers on Med list       Passed - Recent (12 mo) or future (30 days) visit within the authorizing provider's specialty    Patient had office visit in the last 12 months or has a visit in the next 30 days with authorizing provider or within the authorizing provider's specialty.  See \"Patient Info\" tab in inbasket, or \"Choose Columns\" in Meds & Orders section of the refill encounter.             Passed - Medication is active on med list       Passed - Patient is age 18 or older          "

## 2019-04-04 RX ORDER — SILDENAFIL CITRATE 20 MG/1
TABLET ORAL
Qty: 90 TABLET | Refills: 0 | Status: SHIPPED | OUTPATIENT
Start: 2019-04-04 | End: 2020-01-28

## 2019-04-04 NOTE — TELEPHONE ENCOUNTER
Prescription approved per [facility] Refill Protocol.      Lida Olsen RN, BSN

## 2019-04-05 ENCOUNTER — OFFICE VISIT (OUTPATIENT)
Dept: FAMILY MEDICINE | Facility: CLINIC | Age: 49
End: 2019-04-05
Payer: COMMERCIAL

## 2019-04-05 VITALS
WEIGHT: 225 LBS | SYSTOLIC BLOOD PRESSURE: 146 MMHG | OXYGEN SATURATION: 98 % | BODY MASS INDEX: 37.49 KG/M2 | DIASTOLIC BLOOD PRESSURE: 80 MMHG | HEART RATE: 63 BPM | HEIGHT: 65 IN | TEMPERATURE: 97.8 F

## 2019-04-05 DIAGNOSIS — M25.561 CHRONIC PAIN OF RIGHT KNEE: Primary | ICD-10-CM

## 2019-04-05 DIAGNOSIS — G89.29 CHRONIC PAIN OF RIGHT KNEE: Primary | ICD-10-CM

## 2019-04-05 PROCEDURE — 20610 DRAIN/INJ JOINT/BURSA W/O US: CPT | Performed by: FAMILY MEDICINE

## 2019-04-05 RX ORDER — TRIAMCINOLONE ACETONIDE 40 MG/ML
40 INJECTION, SUSPENSION INTRA-ARTICULAR; INTRAMUSCULAR ONCE
Status: ACTIVE | OUTPATIENT
Start: 2019-04-05

## 2019-04-05 ASSESSMENT — PAIN SCALES - GENERAL: PAINLEVEL: MODERATE PAIN (5)

## 2019-04-05 ASSESSMENT — MIFFLIN-ST. JEOR: SCORE: 1807.47

## 2019-04-05 NOTE — PROGRESS NOTES
"  SUBJECTIVE:   Xavier Mcguire is a 49 year old male who presents to clinic today for the following health issues:    Patient presents for Cortisone Injection of right knee.    SUBJECTIVE:  Here for right knee injection.      Review of systems otherwise negative.  Past medical, family, and social history reviewed and updated in chart.    OBJECTIVE:  /80 (BP Location: Right arm, Patient Position: Chair, Cuff Size: Adult Large)   Pulse 63   Temp 97.8  F (36.6  C) (Oral)   Ht 1.651 m (5' 5\")   Wt 102.1 kg (225 lb)   SpO2 98%   BMI 37.44 kg/m    Alert, pleasant, upbeat, and in no apparent discomfort.    Discussed risks and benefits of proposed procedure - patient agreed to proceed.   Right knee prepped sterile with betadine.  Skin anesthetized with 1% lido no epi.  Joint space injected with 5 cc of 4:1 mixture of lido 1% and kenalog 40  Bandaged   No complications    ASSESSMENT / PLAN:  (M25.561,  G89.29) Chronic pain of right knee  (primary encounter diagnosis)  Comment: s/p cortisone injection.  Aftercare discussed  Plan: DRAIN/INJECT LARGE JOINT/BURSA, triamcinolone         (KENALOG-40) injection 40 mg            Follow up 2 weeks as needed   S. Pa Terry MD    (Chart documentation completed in part with Dragon voice-recognition software.  Even though reviewed some grammatical, spelling, and word errors may remain.)     "

## 2019-06-12 ENCOUNTER — OFFICE VISIT (OUTPATIENT)
Dept: FAMILY MEDICINE | Facility: CLINIC | Age: 49
End: 2019-06-12
Payer: COMMERCIAL

## 2019-06-12 VITALS
DIASTOLIC BLOOD PRESSURE: 89 MMHG | SYSTOLIC BLOOD PRESSURE: 133 MMHG | HEART RATE: 97 BPM | BODY MASS INDEX: 35.65 KG/M2 | OXYGEN SATURATION: 99 % | TEMPERATURE: 98.7 F | HEIGHT: 65 IN | WEIGHT: 214 LBS

## 2019-06-12 DIAGNOSIS — M25.561 CHRONIC PAIN OF RIGHT KNEE: ICD-10-CM

## 2019-06-12 DIAGNOSIS — E66.01 SEVERE OBESITY (BMI 35.0-39.9) WITH COMORBIDITY (H): ICD-10-CM

## 2019-06-12 DIAGNOSIS — Z12.11 SPECIAL SCREENING FOR MALIGNANT NEOPLASMS, COLON: ICD-10-CM

## 2019-06-12 DIAGNOSIS — G89.29 CHRONIC PAIN OF RIGHT KNEE: ICD-10-CM

## 2019-06-12 DIAGNOSIS — G89.4 CHRONIC PAIN SYNDROME: ICD-10-CM

## 2019-06-12 DIAGNOSIS — J30.9 ALLERGIC RHINITIS, UNSPECIFIED SEASONALITY, UNSPECIFIED TRIGGER: ICD-10-CM

## 2019-06-12 DIAGNOSIS — E11.65 TYPE 2 DIABETES MELLITUS WITH HYPERGLYCEMIA, WITHOUT LONG-TERM CURRENT USE OF INSULIN (H): Primary | ICD-10-CM

## 2019-06-12 DIAGNOSIS — I10 ESSENTIAL HYPERTENSION WITH GOAL BLOOD PRESSURE LESS THAN 130/80: ICD-10-CM

## 2019-06-12 PROCEDURE — 99214 OFFICE O/P EST MOD 30 MIN: CPT | Performed by: FAMILY MEDICINE

## 2019-06-12 RX ORDER — BLOOD-GLUCOSE METER
EACH MISCELLANEOUS
Qty: 1 KIT | Refills: 0 | Status: SHIPPED | OUTPATIENT
Start: 2019-06-12 | End: 2021-02-16

## 2019-06-12 RX ORDER — OXYCODONE AND ACETAMINOPHEN 7.5; 325 MG/1; MG/1
1-2 TABLET ORAL 3 TIMES DAILY PRN
Qty: 150 TABLET | Refills: 0 | Status: SHIPPED | OUTPATIENT
Start: 2019-06-12 | End: 2019-08-21

## 2019-06-12 RX ORDER — GLIMEPIRIDE 4 MG/1
4 TABLET ORAL 2 TIMES DAILY
Qty: 180 TABLET | Refills: 1 | Status: SHIPPED | OUTPATIENT
Start: 2019-06-12 | End: 2019-11-20

## 2019-06-12 RX ORDER — LANCING DEVICE/LANCETS
KIT MISCELLANEOUS
Qty: 1 EACH | Refills: 0 | Status: SHIPPED | OUTPATIENT
Start: 2019-06-12 | End: 2021-02-16

## 2019-06-12 RX ORDER — OXYCODONE AND ACETAMINOPHEN 7.5; 325 MG/1; MG/1
1-2 TABLET ORAL 3 TIMES DAILY PRN
Qty: 150 TABLET | Refills: 0 | Status: SHIPPED | OUTPATIENT
Start: 2019-07-12 | End: 2019-08-21

## 2019-06-12 RX ORDER — LANCETS
EACH MISCELLANEOUS
Qty: 100 EACH | Refills: 5 | Status: SHIPPED | OUTPATIENT
Start: 2019-06-12 | End: 2021-02-16

## 2019-06-12 RX ORDER — AMLODIPINE BESYLATE 10 MG/1
10 TABLET ORAL DAILY
Qty: 90 TABLET | Refills: 1 | Status: SHIPPED | OUTPATIENT
Start: 2019-06-12 | End: 2019-11-20

## 2019-06-12 RX ORDER — PIOGLITAZONEHYDROCHLORIDE 45 MG/1
45 TABLET ORAL DAILY
Qty: 90 TABLET | Refills: 1 | Status: SHIPPED | OUTPATIENT
Start: 2019-06-12 | End: 2019-11-20

## 2019-06-12 RX ORDER — FEXOFENADINE HCL 180 MG/1
180 TABLET ORAL DAILY
Qty: 90 TABLET | Refills: 1 | Status: SHIPPED | OUTPATIENT
Start: 2019-06-12 | End: 2019-11-20

## 2019-06-12 RX ORDER — CHLORTHALIDONE 25 MG/1
25 TABLET ORAL DAILY
Qty: 90 TABLET | Refills: 1 | Status: SHIPPED | OUTPATIENT
Start: 2019-06-12 | End: 2019-11-20

## 2019-06-12 ASSESSMENT — ANXIETY QUESTIONNAIRES
1. FEELING NERVOUS, ANXIOUS, OR ON EDGE: NOT AT ALL
7. FEELING AFRAID AS IF SOMETHING AWFUL MIGHT HAPPEN: NOT AT ALL
6. BECOMING EASILY ANNOYED OR IRRITABLE: NOT AT ALL
3. WORRYING TOO MUCH ABOUT DIFFERENT THINGS: NOT AT ALL
5. BEING SO RESTLESS THAT IT IS HARD TO SIT STILL: NOT AT ALL
GAD7 TOTAL SCORE: 1
2. NOT BEING ABLE TO STOP OR CONTROL WORRYING: SEVERAL DAYS

## 2019-06-12 ASSESSMENT — PATIENT HEALTH QUESTIONNAIRE - PHQ9
5. POOR APPETITE OR OVEREATING: NOT AT ALL
SUM OF ALL RESPONSES TO PHQ QUESTIONS 1-9: 3

## 2019-06-12 ASSESSMENT — MIFFLIN-ST. JEOR: SCORE: 1757.58

## 2019-06-12 NOTE — PATIENT INSTRUCTIONS
Blood sugar:     -Most people start to feel symptoms less than 80  -If this is routinely happening you could cut your glimepiride down to once a day, and even down to half a tablet a day if needed.  -The other diabetes medicines are not likely to drive your blood sugar too low    Blood pressure:     -Normal range is 100-140 / 60-90.  -If you get down close to or below 100 on the top number he might start to feel somewhat dizzy  -If so, cut the amlodipine in half to 5 mg  -You also take losartan and hydrochlorothiazide for blood pressure, but those are less likely to drop blood pressure too much    Knee:    -I am going to have the schedulers from our sports and orthopedic department contact you about setting up an appointment with 1 of our  to talk about other kinds of injections that might last longer.

## 2019-06-12 NOTE — PROGRESS NOTES
Subjective     Xavier Mcguire is a 49 year old male who presents to clinic today for the following health issues:    HPI   Diabetes Follow-up      How often are you checking your blood sugar? Not at all    What time of day are you checking your blood sugars (select all that apply)?  NA    Have you had any blood sugars above 200?  No    Have you had any blood sugars below 70?  No    What symptoms do you notice when your blood sugar is low?  None    What concerns do you have today about your diabetes? None     Do you have any of these symptoms? (Select all that apply)  No numbness or tingling in feet.  No redness, sores or blisters on feet.  No complaints of excessive thirst.  No reports of blurry vision.  No significant changes to weight.     Have you had a diabetic eye exam in the last 12 months? No    BP Readings from Last 2 Encounters:   04/05/19 146/80   03/15/19 134/78     Hemoglobin A1C (%)   Date Value   03/15/2019 8.0 (H)   12/19/2018 9.2 (H)     LDL Cholesterol Calculated (mg/dL)   Date Value   06/06/2018 32   09/06/2017 49     LDL Cholesterol Direct (mg/dL)   Date Value   03/15/2019 74       Diabetes Management Resources  Hypertension Follow-up      Do you check your blood pressure regularly outside of the clinic? Yes     Are you following a low salt diet? Yes    Are your blood pressures ever more than 140 on the top number (systolic) OR more   than 90 on the bottom number (diastolic), for example 140/90? No  Chronic Pain Follow-Up       Type / Location of Pain: Right Knee  Analgesia/pain control:       Recent changes:  Improved with Cortisone Injection but pain is starting to return       Overall control: Tolerable with discomfort  Activity level/function:      Daily activities:  Able to do light housework, cooking    Work:  Able to work part time with limitations  Adverse effects:  No  Adherance    Taking medication as directed?  Yes    Participating in other treatments: yes-injection  Risk  "Factors:    Sleep:  Poor    Mood/anxiety:  controlled    Recent family or social stressors:  none noted    Other aggravating factors: Prolonged activities   PHQ-9 SCORE 12/4/2017 6/6/2018 12/6/2018   PHQ-9 Total Score - - -   PHQ-9 Total Score 2 2 3     MIKE-7 SCORE 3/8/2017 12/4/2017 6/6/2018   Total Score - - -   Total Score 5 0 1     Encounter-Level CSA - 08/10/2015:    Controlled Substance Agreement - Scan on 8/12/2015  1:06 PM: [facility] [place] CONTROLLED SUBSTANCE AGREEMENT (below)       Patient-Level CSA:    Controlled Substance Agreement - Opioid - Scan on 12/19/2018  1:21 PM (below)           Amount of exercise or physical activity: 1 day/week for an average of 15-30 minutes    Problems taking medications regularly: No    Medication side effects: none    Diet: low salt    SUBJECTIVE:  Here today in follow-up of diabetes and hypertension.  Has started a 10-week program through work on healthy eating and weight loss.  Is already down about 10 pounds.  Has not really been monitoring blood sugars but wants to know parameters for blood sugar and blood pressure as he loses weight.  Still having quite a bit of pain in his right knee from the cortisone injection from April really did not do much.  We talked about other potential types of injections or interventions and I think it is time to get him set up with orthopedics.    Review of systems otherwise negative.  Past medical, family, and social history reviewed and updated in chart.    OBJECTIVE:  /89 (BP Location: Right arm, Patient Position: Chair, Cuff Size: Adult Large)   Pulse 97   Temp 98.7  F (37.1  C) (Oral)   Ht 1.651 m (5' 5\")   Wt 97.1 kg (214 lb)   SpO2 99%   BMI 35.61 kg/m    Alert, pleasant, upbeat, and in no apparent discomfort.  S1 and S2 normal, no murmurs, clicks, gallops or rubs. Regular rate and rhythm. Chest is clear; no wheezes or rales. No edema or JVD.  FEET: both sides normal; no swelling, tenderness or skin or vascular lesions.  " Sensation is intact. Peripheral pulses are palpable. Toenails are normal.   Past labs reviewed with the patient.     ASSESSMENT / PLAN:  (E11.65) Type 2 diabetes mellitus with hyperglycemia, without long-term current use of insulin (H)  (primary encounter diagnosis)  Comment: Recheck A1c now that he has lost some weight and I get it is below our goal of 8.  Parameters written out for the patient  Plan: blood glucose (ACCU-CHEK FASTCLIX) lancing         device, blood glucose monitoring (ACCU-CHEK         FASTCLIX) lancets, empagliflozin (JARDIANCE) 10        MG TABS tablet, glimepiride (AMARYL) 4 MG         tablet, metFORMIN (GLUCOPHAGE) 1000 MG tablet,         pioglitazone (ACTOS) 45 MG tablet, blood         glucose monitoring (ACCU-CHEK FIDEL PLUS) meter        device kit, blood glucose (ACCU-CHEK FIDEL)         test strip, Hemoglobin A1c            (E66.01) Severe obesity (BMI 35.0-39.9) with comorbidity (H)  Comment: Now actively involved in a weight loss program  Plan:     (I10) Essential hypertension with goal blood pressure less than 130/80  Comment: Monitoring  Plan: amLODIPine (NORVASC) 10 MG tablet,         chlorthalidone (HYGROTON) 25 MG tablet            (J30.9) Allergic rhinitis, unspecified seasonality, unspecified trigger  Comment:   Plan: fexofenadine (ALLEGRA) 180 MG tablet            (M25.561,  G89.29) Chronic pain of right knee  Comment: Would like to get him in with sports medicine to talk about potential options  Plan: oxyCODONE-acetaminophen (PERCOCET) 7.5-325 MG         per tablet, oxyCODONE-acetaminophen (PERCOCET)         7.5-325 MG per tablet, ORTHO  REFERRAL            (G89.4) Chronic pain syndrome  Comment: Problem list and CSA up-to-date.  Will obtain urine drug screen  Plan: oxyCODONE-acetaminophen (PERCOCET) 7.5-325 MG         per tablet, oxyCODONE-acetaminophen (PERCOCET)         7.5-325 MG per tablet, Drug Abuse Screen Panel         13, Urine (Pain Care Package)             (Z12.11) Special screening for malignant neoplasms, colon  Comment:   Plan: Fecal colorectal cancer screen (FIT)            Follow up 3 months  STEW Terry MD    (Chart documentation completed in part with Dragon voice-recognition software.  Even though reviewed some grammatical, spelling, and word errors may remain.)

## 2019-06-13 ASSESSMENT — ANXIETY QUESTIONNAIRES: GAD7 TOTAL SCORE: 1

## 2019-08-21 ENCOUNTER — OFFICE VISIT (OUTPATIENT)
Dept: FAMILY MEDICINE | Facility: CLINIC | Age: 49
End: 2019-08-21
Payer: COMMERCIAL

## 2019-08-21 VITALS
OXYGEN SATURATION: 99 % | BODY MASS INDEX: 36.15 KG/M2 | DIASTOLIC BLOOD PRESSURE: 86 MMHG | HEART RATE: 64 BPM | SYSTOLIC BLOOD PRESSURE: 138 MMHG | WEIGHT: 217 LBS | HEIGHT: 65 IN | TEMPERATURE: 98 F

## 2019-08-21 DIAGNOSIS — Z12.11 SPECIAL SCREENING FOR MALIGNANT NEOPLASMS, COLON: ICD-10-CM

## 2019-08-21 DIAGNOSIS — G89.29 CHRONIC PAIN OF RIGHT KNEE: ICD-10-CM

## 2019-08-21 DIAGNOSIS — E11.65 TYPE 2 DIABETES MELLITUS WITH HYPERGLYCEMIA, WITHOUT LONG-TERM CURRENT USE OF INSULIN (H): Primary | ICD-10-CM

## 2019-08-21 DIAGNOSIS — M25.561 CHRONIC PAIN OF RIGHT KNEE: ICD-10-CM

## 2019-08-21 DIAGNOSIS — G89.4 CHRONIC PAIN SYNDROME: ICD-10-CM

## 2019-08-21 DIAGNOSIS — I10 ESSENTIAL HYPERTENSION WITH GOAL BLOOD PRESSURE LESS THAN 130/80: ICD-10-CM

## 2019-08-21 LAB
AMPHETAMINES UR QL: NOT DETECTED NG/ML
BARBITURATES UR QL SCN: NOT DETECTED NG/ML
BENZODIAZ UR QL SCN: NOT DETECTED NG/ML
BUPRENORPHINE UR QL: NOT DETECTED NG/ML
CANNABINOIDS UR QL: NOT DETECTED NG/ML
COCAINE UR QL SCN: NOT DETECTED NG/ML
D-METHAMPHET UR QL: NOT DETECTED NG/ML
HBA1C MFR BLD: 7.8 % (ref 0–5.6)
METHADONE UR QL SCN: NOT DETECTED NG/ML
OPIATES UR QL SCN: NOT DETECTED NG/ML
OXYCODONE UR QL SCN: ABNORMAL NG/ML
PCP UR QL SCN: NOT DETECTED NG/ML
PROPOXYPH UR QL: NOT DETECTED NG/ML
TRICYCLICS UR QL SCN: NOT DETECTED NG/ML

## 2019-08-21 PROCEDURE — 83036 HEMOGLOBIN GLYCOSYLATED A1C: CPT | Performed by: FAMILY MEDICINE

## 2019-08-21 PROCEDURE — 36415 COLL VENOUS BLD VENIPUNCTURE: CPT | Performed by: FAMILY MEDICINE

## 2019-08-21 PROCEDURE — 99214 OFFICE O/P EST MOD 30 MIN: CPT | Performed by: FAMILY MEDICINE

## 2019-08-21 PROCEDURE — 80306 DRUG TEST PRSMV INSTRMNT: CPT | Performed by: FAMILY MEDICINE

## 2019-08-21 RX ORDER — LOSARTAN POTASSIUM 50 MG/1
50 TABLET ORAL DAILY
Qty: 90 TABLET | Refills: 1 | Status: SHIPPED | OUTPATIENT
Start: 2019-08-21 | End: 2019-11-20

## 2019-08-21 RX ORDER — OXYCODONE AND ACETAMINOPHEN 7.5; 325 MG/1; MG/1
1-2 TABLET ORAL 3 TIMES DAILY PRN
Qty: 150 TABLET | Refills: 0 | Status: SHIPPED | OUTPATIENT
Start: 2019-08-21 | End: 2019-09-20

## 2019-08-21 ASSESSMENT — MIFFLIN-ST. JEOR: SCORE: 1771.19

## 2019-08-21 NOTE — PROGRESS NOTES
Subjective     Xavier Mcguire is a 49 year old male who presents to clinic today for the following health issues:    HPI   Diabetes Follow-up      How often are you checking your blood sugar? A few times a month    What time of day are you checking your blood sugars (select all that apply)?  Before meals    Have you had any blood sugars above 200?  Yes     Have you had any blood sugars below 70?  No    What symptoms do you notice when your blood sugar is low?  None    What concerns do you have today about your diabetes? Blood sugar is often over 200     Do you have any of these symptoms? (Select all that apply)  No numbness or tingling in feet.  No redness, sores or blisters on feet.  No complaints of excessive thirst.  No reports of blurry vision.  No significant changes to weight.     Have you had a diabetic eye exam in the last 12 months? No    BP Readings from Last 2 Encounters:   06/12/19 133/89   04/05/19 146/80     Hemoglobin A1C (%)   Date Value   03/15/2019 8.0 (H)   12/19/2018 9.2 (H)     LDL Cholesterol Calculated (mg/dL)   Date Value   06/06/2018 32   09/06/2017 49     LDL Cholesterol Direct (mg/dL)   Date Value   03/15/2019 74       Diabetes Management Resources  Hypertension Follow-up      Do you check your blood pressure regularly outside of the clinic? Yes     Are you following a low salt diet? Yes    Are your blood pressures ever more than 140 on the top number (systolic) OR more   than 90 on the bottom number (diastolic), for example 140/90? No  Chronic Pain Follow-Up       Type / Location of Pain:  Right Knee  Analgesia/pain control:       Recent changes:  same      Overall control: Tolerable with discomfort  Activity level/function:      Daily activities:  Able to do light housework, cooking    Work:  Able to work part time with limitations  Adverse effects:  No  Adherance    Taking medication as directed?  Yes    Participating in other treatments: no   Risk Factors:    Sleep:  Fair     "Mood/anxiety:  controlled    Recent family or social stressors:  none noted    Other aggravating factors: prolonged standing  PHQ-9 SCORE 6/6/2018 12/6/2018 6/12/2019   PHQ-9 Total Score - - -   PHQ-9 Total Score 2 3 3     MIKE-7 SCORE 12/4/2017 6/6/2018 6/12/2019   Total Score - - -   Total Score 0 1 1     Encounter-Level CSA - 08/10/2015:    Controlled Substance Agreement - Scan on 8/12/2015  1:06 PM:  [place] CONTROLLED SUBSTANCE AGREEMENT (below)       Patient-Level CSA:    Controlled Substance Agreement - Opioid - Scan on 12/19/2018  1:21 PM (below)             How many servings of fruits and vegetables do you eat daily?  2-3    On average, how many sweetened beverages do you drink each day (soda, juice, sweet tea, etc)?   0    How many days per week do you miss taking your medication? 2    SUBJECTIVE:  Here today in follow-up of diabetes, hypertension, chronic knee pain.  Failed to drop off a urine sample at his last visit and I told him that no further prescriptions would be given until he is able to produce this.  Had been working on diet and exercise and was down about 10 pounds but said he stopped this.  Again compliance with lifestyle and medications is always the issue for the patient    Review of systems otherwise negative.  Past medical, family, and social history reviewed and updated in chart.    OBJECTIVE:  /86 (BP Location: Right arm, Patient Position: Chair, Cuff Size: Adult Large)   Pulse 64   Temp 98  F (36.7  C) (Oral)   Ht 1.651 m (5' 5\")   Wt 98.4 kg (217 lb)   SpO2 99%   BMI 36.11 kg/m    Alert, pleasant, upbeat, and in no apparent discomfort.  S1 and S2 normal, no murmurs, clicks, gallops or rubs. Regular rate and rhythm. Chest is clear; no wheezes or rales. No edema or JVD.   Past labs reviewed with the patient.     ASSESSMENT / PLAN:  (E11.65) Type 2 diabetes mellitus with hyperglycemia, without long-term current use of insulin (H)  (primary encounter diagnosis)  Comment: " Recheck A1c.  I am hoping it is down from 8.0 in March.  I really do not have much else to tell him because he does not seem to stick with any particular program  Plan: Hemoglobin A1c            (I10) Essential hypertension with goal blood pressure less than 130/80  Comment: Doing okay on current dosage.  Continue same  Plan: losartan (COZAAR) 50 MG tablet            (G89.4) Chronic pain syndrome  Comment: Problem list up-to-date.  Needs urine drug screen.  Controlled substance agreement due in December  Plan: Drug Abuse Screen Panel 13, Urine (Pain Care         Package), oxyCODONE-acetaminophen (PERCOCET)         7.5-325 MG per tablet            (M25.561,  G89.29) Chronic pain of right knee  Comment:   Plan: oxyCODONE-acetaminophen (PERCOCET) 7.5-325 MG         per tablet            (Z12.11) Special screening for malignant neoplasms, colon  Comment:   Plan: GASTROENTEROLOGY ADULT REF PROCEDURE ONLY [facility] [phone]; No Provider         Preference, Fecal colorectal cancer screen         (FIT)            Follow up 3 months or based upon results  STEW Terry MD    (Chart documentation completed in part with Dragon voice-recognition software.  Even though reviewed some grammatical, spelling, and word errors may remain.)

## 2019-08-22 NOTE — RESULT ENCOUNTER NOTE
[place], I was correct!  A1c 7.8.  Strong work!  Your urine screen was normal and as expected - showing the medication as prescribed.  As part of [facility] new monitoring policy we will periodically recheck this test.  STEW Terry M.D.

## 2019-09-20 ENCOUNTER — TELEPHONE (OUTPATIENT)
Dept: FAMILY MEDICINE | Facility: CLINIC | Age: 49
End: 2019-09-20

## 2019-09-20 ENCOUNTER — NURSE TRIAGE (OUTPATIENT)
Dept: NURSING | Facility: CLINIC | Age: 49
End: 2019-09-20

## 2019-09-20 DIAGNOSIS — M25.561 CHRONIC PAIN OF RIGHT KNEE: ICD-10-CM

## 2019-09-20 DIAGNOSIS — G89.4 CHRONIC PAIN SYNDROME: ICD-10-CM

## 2019-09-20 DIAGNOSIS — G89.29 CHRONIC PAIN OF RIGHT KNEE: ICD-10-CM

## 2019-09-20 RX ORDER — OXYCODONE AND ACETAMINOPHEN 7.5; 325 MG/1; MG/1
1-2 TABLET ORAL 3 TIMES DAILY PRN
Qty: 150 TABLET | Refills: 0 | Status: SHIPPED | OUTPATIENT
Start: 2019-09-20 | End: 2019-10-22

## 2019-09-20 NOTE — TELEPHONE ENCOUNTER
\  Requested Prescriptions   Pending Prescriptions Disp Refills     oxyCODONE-acetaminophen (PERCOCET) 7.5-325 MG per tablet  Last Written Prescription Date:  8/21/19  Last Fill Quantity: 150 tablet,  # refills: 0   Last office visit: 8/21/2019 with prescribing provider:  Dr. Terry   Future Office Visit:    Routing refill request to provider for review/approval because:  Drug not on the [facility], P or  Health refill protocol or controlled substance   150 tablet 0     Sig: Take 1-2 tablets by mouth 3 times daily as needed for pain       There is no refill protocol information for this order

## 2019-09-20 NOTE — TELEPHONE ENCOUNTER
.Reason for Call:  Medication or medication refill:    Do you use a [facility]?  Name of the pharmacy and phone number for the current request:  [facility] - [phone]    Name of the medication requested: oxycodone-acetaminophen(PERCOCET)  7.5-325 [place] per tablet 1-2 tablet, 3 TIMES DAILY AS NEEDED    Other request: call when done please, as Patient tried to request from the pharmacy directly, and told this was not allowed, informed of our new system and put in his request; Thank you    Can we leave a detailed message on this number? YES    Phone number patient can be reached at: Cell number on file:    Telephone Information:   Mobile [phone]       Best Time: anytime    Call taken on 9/20/2019 at 8:25 AM by Afshan Cadena

## 2019-09-20 NOTE — TELEPHONE ENCOUNTER
Requesting a refill of narcotics. Declined send a message to have them call him back. I advised it can take up to 3 business days to process the request. He wants to call the clinic back instead, at 8 a.m. When they turn on their phones.  Saba Edwards RN-[facility]

## 2019-09-20 NOTE — TELEPHONE ENCOUNTER
Controlled Substance Refill Request for oxyCODONE-acetaminophen (PERCOCET) 7.5-325 MG per tablet  Problem List Complete:  Yes  Overview Addendum 6/12/2019  9:27 AM by Dimitri Terry MD   Patient is followed by DIMITRI TERRY for ongoing prescription of pain medication.  All refills should be approved by this provider, or covering partner.     Medication(s): percocet 7.5.   Maximum quantity per month: 150 = 56.25 MED  Narcan n/a  Clinic visit frequency required: Q 3  months      Controlled substance agreement on file: Yes       Date(s): 12/18/18     Pain Clinic evaluation in the past: No     DIRE Total Score(s):    8/10/2015   Total Score 20         Last [facility] website verification:  done on 7/5/18        checked in past 3 months?  Yes 7/5/19     Routing refill request to provider for review/approval because:  Drug not on the [facility] refill protocol   Rosalba Alba RN

## 2019-09-23 NOTE — TELEPHONE ENCOUNTER
This writer attempted to contact 1 on 09/23/19      Reason for call Xavier and left detailed message.      If patient calls back:   Relay message that refill was sent to pharmacy, (read verbatim), document that pt called and close encounter        Bethany Dugan MA

## 2019-09-23 NOTE — TELEPHONE ENCOUNTER
Reason for Call:  Other prescription    Detailed comments: Pt calling to follow up on medication request and would like for Dr. Terry to send in Rx to Pharmacy as soon as possible.    Phone Number Patient can be reached at: Home number on file [phone] (home)    Best Time: anytime    Can we leave a detailed message on this number? YES    Call taken on 9/23/2019 at 11:24 AM by George Mccloud

## 2019-10-21 DIAGNOSIS — G89.29 CHRONIC PAIN OF RIGHT KNEE: ICD-10-CM

## 2019-10-21 DIAGNOSIS — G89.4 CHRONIC PAIN SYNDROME: ICD-10-CM

## 2019-10-21 DIAGNOSIS — M25.561 CHRONIC PAIN OF RIGHT KNEE: ICD-10-CM

## 2019-10-22 RX ORDER — OXYCODONE AND ACETAMINOPHEN 7.5; 325 MG/1; MG/1
1-2 TABLET ORAL 3 TIMES DAILY PRN
Qty: 150 TABLET | Refills: 0 | Status: SHIPPED | OUTPATIENT
Start: 2019-10-22 | End: 2019-11-20

## 2019-11-14 ENCOUNTER — TRANSFERRED RECORDS (OUTPATIENT)
Dept: HEALTH INFORMATION MANAGEMENT | Facility: CLINIC | Age: 49
End: 2019-11-14

## 2019-11-20 ENCOUNTER — OFFICE VISIT (OUTPATIENT)
Dept: FAMILY MEDICINE | Facility: CLINIC | Age: 49
End: 2019-11-20
Payer: COMMERCIAL

## 2019-11-20 VITALS
TEMPERATURE: 98.6 F | OXYGEN SATURATION: 99 % | SYSTOLIC BLOOD PRESSURE: 130 MMHG | BODY MASS INDEX: 36.99 KG/M2 | HEART RATE: 67 BPM | HEIGHT: 65 IN | WEIGHT: 222 LBS | DIASTOLIC BLOOD PRESSURE: 84 MMHG

## 2019-11-20 DIAGNOSIS — G89.29 CHRONIC PAIN OF RIGHT KNEE: ICD-10-CM

## 2019-11-20 DIAGNOSIS — E11.65 TYPE 2 DIABETES MELLITUS WITH HYPERGLYCEMIA, WITHOUT LONG-TERM CURRENT USE OF INSULIN (H): Primary | ICD-10-CM

## 2019-11-20 DIAGNOSIS — M25.561 CHRONIC PAIN OF RIGHT KNEE: ICD-10-CM

## 2019-11-20 DIAGNOSIS — G89.4 CHRONIC PAIN SYNDROME: ICD-10-CM

## 2019-11-20 DIAGNOSIS — I10 ESSENTIAL HYPERTENSION WITH GOAL BLOOD PRESSURE LESS THAN 130/80: ICD-10-CM

## 2019-11-20 DIAGNOSIS — Z13.6 CARDIOVASCULAR SCREENING; LDL GOAL LESS THAN 70: ICD-10-CM

## 2019-11-20 DIAGNOSIS — J30.9 ALLERGIC RHINITIS, UNSPECIFIED SEASONALITY, UNSPECIFIED TRIGGER: ICD-10-CM

## 2019-11-20 DIAGNOSIS — Z23 NEED FOR PROPHYLACTIC VACCINATION AND INOCULATION AGAINST INFLUENZA: ICD-10-CM

## 2019-11-20 PROCEDURE — 90686 IIV4 VACC NO PRSV 0.5 ML IM: CPT | Performed by: FAMILY MEDICINE

## 2019-11-20 PROCEDURE — 90471 IMMUNIZATION ADMIN: CPT | Performed by: FAMILY MEDICINE

## 2019-11-20 PROCEDURE — 99214 OFFICE O/P EST MOD 30 MIN: CPT | Mod: 25 | Performed by: FAMILY MEDICINE

## 2019-11-20 RX ORDER — PIOGLITAZONEHYDROCHLORIDE 45 MG/1
45 TABLET ORAL DAILY
Qty: 90 TABLET | Refills: 1 | Status: SHIPPED | OUTPATIENT
Start: 2019-11-20 | End: 2020-05-04

## 2019-11-20 RX ORDER — CHLORTHALIDONE 25 MG/1
25 TABLET ORAL DAILY
Qty: 90 TABLET | Refills: 1 | Status: SHIPPED | OUTPATIENT
Start: 2019-11-20 | End: 2020-05-04

## 2019-11-20 RX ORDER — GLIMEPIRIDE 4 MG/1
4 TABLET ORAL 2 TIMES DAILY
Qty: 180 TABLET | Refills: 1 | Status: SHIPPED | OUTPATIENT
Start: 2019-11-20 | End: 2020-05-04

## 2019-11-20 RX ORDER — AMLODIPINE BESYLATE 10 MG/1
10 TABLET ORAL DAILY
Qty: 90 TABLET | Refills: 1 | Status: SHIPPED | OUTPATIENT
Start: 2019-11-20 | End: 2020-05-04

## 2019-11-20 RX ORDER — LOSARTAN POTASSIUM 50 MG/1
50 TABLET ORAL DAILY
Qty: 90 TABLET | Refills: 1 | Status: SHIPPED | OUTPATIENT
Start: 2019-11-20 | End: 2020-05-04

## 2019-11-20 RX ORDER — OXYCODONE AND ACETAMINOPHEN 7.5; 325 MG/1; MG/1
1-2 TABLET ORAL 3 TIMES DAILY PRN
Qty: 150 TABLET | Refills: 0 | Status: SHIPPED | OUTPATIENT
Start: 2019-11-20 | End: 2019-12-18

## 2019-11-20 RX ORDER — FEXOFENADINE HCL 180 MG/1
180 TABLET ORAL DAILY
Qty: 90 TABLET | Refills: 1 | Status: SHIPPED | OUTPATIENT
Start: 2019-11-20 | End: 2020-05-04

## 2019-11-20 ASSESSMENT — MIFFLIN-ST. JEOR: SCORE: 1793.87

## 2019-11-20 NOTE — PROGRESS NOTES
Subjective     Xavier Mcguire is a 49 year old male who presents to clinic today for the following health issues:    HPI   Diabetes Follow-up    How often are you checking your blood sugar? A few times a month  What time of day are you checking your blood sugars (select all that apply)?  Before meals  Have you had any blood sugars above 200?  Yes   Have you had any blood sugars below 70?  No    What symptoms do you notice when your blood sugar is low?  None    What concerns do you have today about your diabetes? Blood sugar is often over 200     Do you have any of these symptoms? (Select all that apply)  No numbness or tingling in feet.  No redness, sores or blisters on feet.  No complaints of excessive thirst.  No reports of blurry vision.  No significant changes to weight.     Have you had a diabetic eye exam in the last 12 months? No    BP Readings from Last 2 Encounters:   08/21/19 138/86   06/12/19 133/89     Hemoglobin A1C (%)   Date Value   08/21/2019 7.8 (H)   03/15/2019 8.0 (H)     LDL Cholesterol Calculated (mg/dL)   Date Value   06/06/2018 32   09/06/2017 49     LDL Cholesterol Direct (mg/dL)   Date Value   03/15/2019 74       Diabetes Management Resources    Hypertension Follow-up      Do you check your blood pressure regularly outside of the clinic? Yes     Are you following a low salt diet? Yes    Are your blood pressures ever more than 140 on the top number (systolic) OR more   than 90 on the bottom number (diastolic), for example 140/90? No    Chronic Pain Follow-Up       Type / Location of Pain: Right Knee and sometimes Left Knee   Analgesia/pain control:       Recent changes:  Same to fluctuates       Overall control: Tolerable with discomfort  Activity level/function:      Daily activities:  Able to do light housework, cooking    Work:  Able to work part time with limitations  Adverse effects:  No  Adherance    Taking medication as directed?  Yes    Participating in other treatments: no  "  Risk Factors:    Sleep:  Fair    Mood/anxiety:  controlled    Recent family or social stressors:  none noted    Other aggravating factors: prolonged sitting and prolonged standing  PHQ-9 SCORE 6/6/2018 12/6/2018 6/12/2019   PHQ-9 Total Score - - -   PHQ-9 Total Score 2 3 3     MIKE-7 SCORE 12/4/2017 6/6/2018 6/12/2019   Total Score - - -   Total Score 0 1 1     Encounter-Level CSA - 08/10/2015:    Controlled Substance Agreement - Scan on 8/12/2015  1:06 PM: [facility] CONTROLLED SUBSTANCE AGREEMENT     Patient-Level CSA:    Controlled Substance Agreement - Opioid - Scan on 12/19/2018  1:21 PM           SUBJECTIVE:  Here today in follow-up of diabetes, hypertension, chronic knee pain.  Would like a flu shot today.  We have checked on A1c a couple of months ago and is down to 7.8 which is the best readings we have had in years.  But he admits that because of Halloween and upcoming holidays he has not been as good on his diet.  So again stressed the need to increase exercise and eliminate carbohydrate sources, focusing on proteins, vegetables, etc.    Doing well.  Reports no interval health concerns.  Patient reports no side effects from medications, and desires no change in therapy.     Review of systems otherwise negative.  Past medical, family, and social history reviewed and updated in chart.    OBJECTIVE:  /84 (BP Location: Right arm, Patient Position: Chair, Cuff Size: Adult Large)   Pulse 67   Temp 98.6  F (37  C) (Oral)   Ht 1.651 m (5' 5\")   Wt 100.7 kg (222 lb)   SpO2 99%   BMI 36.94 kg/m    Alert, pleasant, upbeat, and in no apparent discomfort.  S1 and S2 normal, no murmurs, clicks, gallops or rubs. Regular rate and rhythm. Chest is clear; no wheezes or rales. No edema or JVD.  Past labs reviewed with the patient.     ASSESSMENT / PLAN:  (E11.65) Type 2 diabetes mellitus with hyperglycemia, without long-term current use of insulin (H)  (primary encounter diagnosis)  Comment: We will renew his " medication and due for recheck in about 3 months.  We can make this a full diabetes recheck at that time.  Advised him to get through the holidays as best as he can get back to his program in preparation for that lab check  Plan: empagliflozin (JARDIANCE) 10 MG TABS tablet,         glimepiride (AMARYL) 4 MG tablet, metFORMIN         (GLUCOPHAGE) 1000 MG tablet, pioglitazone         (ACTOS) 45 MG tablet, **A1C FUTURE anytime,         Albumin Random Urine Quantitative with Creat         Ratio, **Comprehensive metabolic panel FUTURE         anytime, **TSH with free T4 reflex FUTURE         anytime, Lipid panel reflex to direct LDL         Fasting            (I10) Essential hypertension with goal blood pressure less than 130/80  Comment: Well-controlled.  Continue to follow  Plan: amLODIPine (NORVASC) 10 MG tablet,         chlorthalidone (HYGROTON) 25 MG tablet,         losartan (COZAAR) 50 MG tablet            (Z13.6) CARDIOVASCULAR SCREENING; LDL GOAL LESS THAN 70  Comment: Last LDL 74.  We will continue to keep an eye on this  Plan:     (M25.561,  G89.29) Chronic pain of right knee  Comment:   Plan: oxyCODONE-acetaminophen (PERCOCET) 7.5-325 MG         per tablet            (G89.4) Chronic pain syndrome  Comment: Up-to-date on  database screening, controlled substance agreement, urine drug screen  Plan: oxyCODONE-acetaminophen (PERCOCET) 7.5-325 MG         per tablet, Drug Abuse Screen Panel 13, Urine         (Pain Care Package)            (J30.9) Allergic rhinitis, unspecified seasonality, unspecified trigger  Comment:   Plan: fexofenadine (ALLEGRA) 180 MG tablet            (Z23) Need for prophylactic vaccination and inoculation against influenza  Comment:   Plan: INFLUENZA VACCINE IM > 6 MONTHS VALENT IIV4         [58349], Vaccine Administration, Initial         [20033]            Follow up 3 months  S. Pa Terry MD    (Chart documentation completed in part with Dragon voice-recognition software.  Even  though reviewed some grammatical, spelling, and word errors may remain.)

## 2019-12-17 DIAGNOSIS — M25.561 CHRONIC PAIN OF RIGHT KNEE: ICD-10-CM

## 2019-12-17 DIAGNOSIS — G89.4 CHRONIC PAIN SYNDROME: ICD-10-CM

## 2019-12-17 DIAGNOSIS — G89.29 CHRONIC PAIN OF RIGHT KNEE: ICD-10-CM

## 2019-12-17 NOTE — TELEPHONE ENCOUNTER
Requested Prescriptions   Pending Prescriptions Disp Refills     oxyCODONE-acetaminophen (PERCOCET) 7.5-325 MG per tablet  ,  Last Written Prescription Date:  11/20/19  Last Fill Quantity: 150 tablet0,   # refills: 0  Last Office Visit: Dr. Terry  Future Office visit:       Routing refill request to provider for review/approval because:  Drug not on the [facility], P or [facility] refill protocol or controlled substance   150 tablet 0     Sig: Take 1-2 tablets by mouth 3 times daily as needed for pain       There is no refill protocol information for this order

## 2019-12-17 NOTE — TELEPHONE ENCOUNTER
Reason for Call:  Other prescription    Detailed comments: patient requesting oxyCODONE-acetaminophen (PERCOCET) 7.5-325 MG per tablet    Phone Number Patient can be reached at: Home number on file [phone] (home)    Best Time: any    Can we leave a detailed message on this number? YES    Call taken on 12/17/2019 at 8:59 AM by Priscilla Pedraza

## 2019-12-17 NOTE — TELEPHONE ENCOUNTER
Controlled Substance Refill Request for oxyCODONE-acetaminophen (PERCOCET) 7.5-325 MG per tablet  Problem List Complete:  Yes  Overview Addendum 10/22/2019  2:30 PM by Lida Olsen RN   Patient is followed by DIMITRI GUZMAN for ongoing prescription of pain medication.  All refills should be approved by this provider, or covering partner.     Medication(s): percocet 7.5.   Maximum quantity per month: 150 = 56.25 MED  Narcan n/a  Clinic visit frequency required: Q 3  months      Controlled substance agreement on file: Yes       Date(s): 12/18/18     Pain Clinic evaluation in the past: No     DIRE Total Score(s):    8/10/2015   Total Score 20         Last [facility] website verification:  10/22/19    [email or web address]         checked in past 3 months?  Yes 10/22/19     Last Written Prescription Date:  11/20/19  Last Fill Quantity: 150 tablet0,   # refills: 0  Last Office Visit: Dr. Guzman  Future Office visit:None    Routing refill request to provider for review/approval because:  Drug not on the [facility] refill protocol   Rosalba Alba RN  [facility]

## 2019-12-18 RX ORDER — OXYCODONE AND ACETAMINOPHEN 7.5; 325 MG/1; MG/1
1-2 TABLET ORAL 3 TIMES DAILY PRN
Qty: 150 TABLET | Refills: 0 | Status: SHIPPED | OUTPATIENT
Start: 2019-12-18 | End: 2020-01-14

## 2020-01-06 ENCOUNTER — HOSPITAL ENCOUNTER (OUTPATIENT)
Facility: AMBULATORY SURGERY CENTER | Age: 50
End: 2020-01-06
Attending: INTERNAL MEDICINE
Payer: COMMERCIAL

## 2020-01-14 ENCOUNTER — TELEPHONE (OUTPATIENT)
Dept: FAMILY MEDICINE | Facility: CLINIC | Age: 50
End: 2020-01-14

## 2020-01-14 DIAGNOSIS — G89.29 CHRONIC PAIN OF RIGHT KNEE: ICD-10-CM

## 2020-01-14 DIAGNOSIS — M25.561 CHRONIC PAIN OF RIGHT KNEE: ICD-10-CM

## 2020-01-14 DIAGNOSIS — G89.4 CHRONIC PAIN SYNDROME: ICD-10-CM

## 2020-01-14 RX ORDER — OXYCODONE AND ACETAMINOPHEN 7.5; 325 MG/1; MG/1
1-2 TABLET ORAL 3 TIMES DAILY PRN
Qty: 150 TABLET | Refills: 0 | Status: SHIPPED | OUTPATIENT
Start: 2020-01-14 | End: 2020-02-19

## 2020-01-14 RX ORDER — OXYCODONE AND ACETAMINOPHEN 7.5; 325 MG/1; MG/1
1-2 TABLET ORAL 3 TIMES DAILY PRN
Qty: 150 TABLET | Refills: 0 | Status: CANCELLED | OUTPATIENT
Start: 2020-01-14

## 2020-01-14 NOTE — TELEPHONE ENCOUNTER
Controlled Substance Refill Request for Oxycodone-acteminophen  Problem List Complete:  Yes  Overview Addendum 10/22/2019  2:30 PM by Lida Olsen RN   Patient is followed by DIMITRI GUZMAN for ongoing prescription of pain medication.  All refills should be approved by this provider, or covering partner.     Medication(s): percocet 7.5.   Maximum quantity per month: 150 = 56.25 MED  Narcan n/a  Clinic visit frequency required: Q 3  months      Controlled substance agreement on file: Yes       Date(s): 12/18/18     Pain Clinic evaluation in the past: No     DIRE Total Score(s):    8/10/2015   Total Score 20         Last [facility] website verification:  10/22/19        Cris Lema RN, [facility]

## 2020-01-14 NOTE — TELEPHONE ENCOUNTER
Looks like patient scheduled for DM follow up on 2/19/2020.  Unable to leave message.     LXIONG3, MEDICAL ASSISTANT

## 2020-01-14 NOTE — TELEPHONE ENCOUNTER
.Reason for Call:  Medication or medication refill:    Do you use a [facility]?  Name of the pharmacy and phone number for the current request:  [facility] - [phone]    Name of the medication requested:   oxyCODONE-acetaminophen (PERCOCET) 7.5-325 MG per tablet 1-2 tablet, 3 TIMES DAILY PRN         Other request: none    Can we leave a detailed message on this number? Unsure if full    Phone number patient can be reached at: Home number on file [phone] (home)    Best Time: anytime    Call taken on 1/14/2020 at 1:12 PM by Afshan Cadena

## 2020-01-15 ENCOUNTER — TELEPHONE (OUTPATIENT)
Dept: FAMILY MEDICINE | Facility: CLINIC | Age: 50
End: 2020-01-15

## 2020-01-15 NOTE — TELEPHONE ENCOUNTER
"PA for oxyCODONE-acetaminophen (PERCOCET) 7.5-325 MG per tablet    To submit the PA    Go to key.[email or web address] and click \"Enter a Key\"    Key:  [id number]    Enter patients last name and     Complete the form and click \"Send to Plan\"    PA or alternative    Natali Prieto    "

## 2020-01-16 NOTE — TELEPHONE ENCOUNTER
Central Prior Authorization Team   Phone: [phone]      PA Initiation    Medication: oxyCODONE-acetaminophen (PERCOCET) 7.5-325 MG per tablet-Initiated  Insurance Company: [employer] - Phone [phone] Fax [phone]  Pharmacy Filling the Rx: [id number] [place], [place] - [address] AT [facility] [address]  Filling Pharmacy Phone: [phone]  Filling Pharmacy Fax:    Start Date: 1/16/2020

## 2020-01-17 NOTE — TELEPHONE ENCOUNTER
Prior Authorization Approval    Authorization Effective Date: 12/18/2019  Authorization Expiration Date: 1/16/2021  Medication: oxyCODONE-acetaminophen (PERCOCET) 7.5-325 MG per tablet-APPROVED  Approved Dose/Quantity:  Reference #:     Insurance Company: [employer] - Phone [phone] Fax [phone]  Expected CoPay:       CoPay Card Available:      Foundation Assistance Needed:    Which Pharmacy is filling the prescription (Not needed for infusion/clinic administered): [id number] - [place], [place] - [address] AT [facility]  Pharmacy Notified: Yes  Patient Notified: No    Pharmacy will notify patient when medication is ready.

## 2020-01-24 DIAGNOSIS — N52.9 ERECTILE DYSFUNCTION, UNSPECIFIED ERECTILE DYSFUNCTION TYPE: ICD-10-CM

## 2020-01-27 NOTE — TELEPHONE ENCOUNTER
"Requested Prescriptions   Pending Prescriptions Disp Refills     sildenafil (REVATIO) 20 MG tablet [Pharmacy Med Name: SILDENAFIL 20MG TABLETS] 90 tablet 0     Sig: TAKE 5 TABLETS 30 MINUTES TO 4 HOURS PRIOR TO SEX.       Erectile Dysfuction Protocol Passed - 1/24/2020  4:42 PM        Passed - Absence of nitrates on medication list        Passed - Absence of Alpha Blockers on Med list        Passed - Recent (12 mo) or future (30 days) visit within the authorizing provider's specialty     Patient has had an office visit with the authorizing provider or a provider within the authorizing providers department within the previous 12 mos or has a future within next 30 days. See \"Patient Info\" tab in inbasket, or \"Choose Columns\" in Meds & Orders section of the refill encounter.              Passed - Medication is active on med list        Passed - Patient is age 18 or older        sildenafil (REVATIO) 20 MG tablet  Last Written Prescription Date:  4/4/19  Last Fill Quantity: 90,  # refills: 0   Last office visit: 11/20/2019 with prescribing provider:  Dr. Terry   Future Office Visit:   Next 5 appointments (look out 90 days)    Feb 19, 2020  8:00 AM CST  [facility] [address] [place] [id number] [phone]           "

## 2020-01-28 DIAGNOSIS — N52.9 ERECTILE DYSFUNCTION, UNSPECIFIED ERECTILE DYSFUNCTION TYPE: ICD-10-CM

## 2020-01-28 RX ORDER — SILDENAFIL CITRATE 20 MG/1
TABLET ORAL
Qty: 90 TABLET | Refills: 0 | Status: SHIPPED | OUTPATIENT
Start: 2020-01-28 | End: 2020-01-30

## 2020-01-28 NOTE — TELEPHONE ENCOUNTER
Prescription approved per [facility] Refill Protocol.      Lida Olsen RN, BSN, PHN

## 2020-01-29 NOTE — TELEPHONE ENCOUNTER
"Requested Prescriptions   Pending Prescriptions Disp Refills     sildenafil (REVATIO) 20 MG tablet [Pharmacy Med Name: SILDENAFIL 20MG TABLETS] 450 tablet      Sig: TAKE 5 TABLETS BY MOUTH 30 MINUTES TO 4 HOURS BEFORE SEX       Erectile Dysfuction Protocol Passed - 1/28/2020  6:39 PM        Passed - Absence of nitrates on medication list        Passed - Absence of Alpha Blockers on Med list        Passed - Recent (12 mo) or future (30 days) visit within the authorizing provider's specialty     Patient has had an office visit with the authorizing provider or a provider within the authorizing providers department within the previous 12 mos or has a future within next 30 days. See \"Patient Info\" tab in inbasket, or \"Choose Columns\" in Meds & Orders section of the refill encounter.              Passed - Medication is active on med list        Passed - Patient is age 18 or older          "

## 2020-01-30 RX ORDER — SILDENAFIL CITRATE 20 MG/1
TABLET ORAL
Qty: 90 TABLET | Refills: 0 | Status: SHIPPED | OUTPATIENT
Start: 2020-01-30 | End: 2021-02-16

## 2020-01-30 NOTE — TELEPHONE ENCOUNTER
Prescription approved per [facility] Refill [facility].    Ning Rose RN   [facility]

## 2020-02-19 ENCOUNTER — OFFICE VISIT (OUTPATIENT)
Dept: FAMILY MEDICINE | Facility: CLINIC | Age: 50
End: 2020-02-19
Payer: COMMERCIAL

## 2020-02-19 VITALS
SYSTOLIC BLOOD PRESSURE: 136 MMHG | RESPIRATION RATE: 18 BRPM | TEMPERATURE: 98.1 F | WEIGHT: 217.9 LBS | DIASTOLIC BLOOD PRESSURE: 83 MMHG | BODY MASS INDEX: 36.3 KG/M2 | OXYGEN SATURATION: 98 % | HEART RATE: 75 BPM | HEIGHT: 65 IN

## 2020-02-19 DIAGNOSIS — E11.65 TYPE 2 DIABETES MELLITUS WITH HYPERGLYCEMIA, WITHOUT LONG-TERM CURRENT USE OF INSULIN (H): Primary | ICD-10-CM

## 2020-02-19 DIAGNOSIS — M25.561 CHRONIC PAIN OF RIGHT KNEE: ICD-10-CM

## 2020-02-19 DIAGNOSIS — Z12.11 SPECIAL SCREENING FOR MALIGNANT NEOPLASMS, COLON: ICD-10-CM

## 2020-02-19 DIAGNOSIS — G89.4 CHRONIC PAIN SYNDROME: ICD-10-CM

## 2020-02-19 DIAGNOSIS — E66.01 SEVERE OBESITY (BMI 35.0-39.9) WITH COMORBIDITY (H): ICD-10-CM

## 2020-02-19 DIAGNOSIS — I10 ESSENTIAL HYPERTENSION WITH GOAL BLOOD PRESSURE LESS THAN 130/80: ICD-10-CM

## 2020-02-19 DIAGNOSIS — G89.29 CHRONIC PAIN OF RIGHT KNEE: ICD-10-CM

## 2020-02-19 LAB
ALBUMIN SERPL-MCNC: 4.1 G/DL (ref 3.4–5)
ALP SERPL-CCNC: 63 U/L (ref 40–150)
ALT SERPL W P-5'-P-CCNC: 23 U/L (ref 0–70)
ANION GAP SERPL CALCULATED.3IONS-SCNC: 6 MMOL/L (ref 3–14)
AST SERPL W P-5'-P-CCNC: 13 U/L (ref 0–45)
BILIRUB SERPL-MCNC: 0.4 MG/DL (ref 0.2–1.3)
BUN SERPL-MCNC: 14 MG/DL (ref 7–30)
CALCIUM SERPL-MCNC: 9.5 MG/DL (ref 8.5–10.1)
CHLORIDE SERPL-SCNC: 103 MMOL/L (ref 94–109)
CHOLEST SERPL-MCNC: 135 MG/DL
CO2 SERPL-SCNC: 29 MMOL/L (ref 20–32)
CREAT SERPL-MCNC: 1.06 MG/DL (ref 0.66–1.25)
GFR SERPL CREATININE-BSD FRML MDRD: 81 ML/MIN/{1.73_M2}
GLUCOSE SERPL-MCNC: 135 MG/DL (ref 70–99)
HBA1C MFR BLD: 8.1 % (ref 0–5.6)
HDLC SERPL-MCNC: 46 MG/DL
LDLC SERPL CALC-MCNC: 65 MG/DL
NONHDLC SERPL-MCNC: 89 MG/DL
POTASSIUM SERPL-SCNC: 3.3 MMOL/L (ref 3.4–5.3)
PROT SERPL-MCNC: 8 G/DL (ref 6.8–8.8)
SODIUM SERPL-SCNC: 138 MMOL/L (ref 133–144)
TRIGL SERPL-MCNC: 120 MG/DL
TSH SERPL DL<=0.005 MIU/L-ACNC: 1.12 MU/L (ref 0.4–4)

## 2020-02-19 PROCEDURE — 99214 OFFICE O/P EST MOD 30 MIN: CPT | Performed by: FAMILY MEDICINE

## 2020-02-19 PROCEDURE — 36415 COLL VENOUS BLD VENIPUNCTURE: CPT | Performed by: FAMILY MEDICINE

## 2020-02-19 PROCEDURE — 84443 ASSAY THYROID STIM HORMONE: CPT | Performed by: FAMILY MEDICINE

## 2020-02-19 PROCEDURE — 80053 COMPREHEN METABOLIC PANEL: CPT | Performed by: FAMILY MEDICINE

## 2020-02-19 PROCEDURE — 83036 HEMOGLOBIN GLYCOSYLATED A1C: CPT | Performed by: FAMILY MEDICINE

## 2020-02-19 PROCEDURE — 80061 LIPID PANEL: CPT | Performed by: FAMILY MEDICINE

## 2020-02-19 RX ORDER — OXYCODONE AND ACETAMINOPHEN 7.5; 325 MG/1; MG/1
1-2 TABLET ORAL 3 TIMES DAILY PRN
Qty: 150 TABLET | Refills: 0 | Status: SHIPPED | OUTPATIENT
Start: 2020-02-19 | End: 2020-03-17

## 2020-02-19 ASSESSMENT — PAIN SCALES - GENERAL: PAINLEVEL: MODERATE PAIN (5)

## 2020-02-19 ASSESSMENT — MIFFLIN-ST. JEOR: SCORE: 1775.27

## 2020-02-19 NOTE — LETTER
[facility]  02/19/20    Patient: Xavier Mcguire  YOB: 1970  Medical Record Number: [id number]                                                                  Opioid / Opioid Plus Controlled Substance Agreement    I understand that my care provider has prescribed an opioid (narcotic) controlled substance to help manage my condition(s). I am taking this medicine to help me function or work. I know this is strong medicine, and that it can cause serious side effects. Opioid medicine can be sedating, addicting and may cause a dependency on the drug. They can affect my ability to drive or think, and cause depression. They need to be taken exactly as prescribed. Combining opioids with certain medicines or chemicals (such as cocaine, sedatives and tranquilizers, sleeping pills, meth) can be dangerous or even fatal. Also, if I stop opioids suddenly, I may have severe withdrawal symptoms. Last, I understand that opioids do not work for all types of pain nor for all patients. If not helpful, I may be asked to stop them.        The risks, benefits, and side effects of these medicine(s) were explained to me. I agree that:    1. I will take part in other treatments as advised by my care team. This may be psychiatry or counseling, physical therapy, behavioral therapy, group treatment or a referral to a pain clinic. I will reduce or stop my medicine when my care team tells me to do so.  2. I will take my medicines as prescribed. I will not change the dose or schedule unless my care team tells me to. There will be no refills if I  run out early.   I may be contactedwithout warning and asked to complete a urine drug test or pill count at any time.   3. I will keep all my appointments, and understand this is part of the monitoring of opioids. My care team may require an office visit for EVERY opioid/controlled substance refill. If I miss appointments or don t follow instructions, my care team may  stop my medicine.  4. I will not ask other providers to prescribe controlled substances, and I will not accept controlled substances from other people. If I need another prescribed controlled substance for a new reason, I will tell my care team within 1 business day.  5. I will use one pharmacy to fill all of my controlled substance prescriptions, and it is up to me to make sure that I do not run out of my medicines on weekends or holidays. If my care team is willing to refill my opioid prescription without a visit, I must request refills only during office hours, refills may take up to 3 days to process, and it may take up to 5 to 7 days for my medicine to be mailed and ready at my pharmacy. Prescriptions will not be mailed anywhere except my pharmacy.        [id number]  Rev 12/18         Registration to scan to EHR                             Page 1 of 2               Controlled Substance Agreement Opioid        [facility]  02/19/20  Patient: Xavier Mcguire  YOB: 1970  Medical Record Number: [id number]                                                                  6. I am responsible for my prescriptions. If the medicine/prescription is lost or stolen, it will not be replaced. I also agree not to share controlled substance medicines with anyone.  7. I agree to not use ANY illegal or recreational drugs. This includes marijuana, cocaine, bath salts or other drugs. I agree not to use alcohol unless my care team says I may.          I agree to give urine samples whenever asked. If I don t give a urine sample, the care team may stop my medicine.    8. If I enroll in the [place] Medical Marijuana program, I will tell my care team. I will also sign an agreement to share my medical records with my care team.   9. I will bring in my list of medicines (or my medicine bottles) each time I come to the clinic.   10. I will tell my care team right away if I become pregnant or have a new medical  problem treated outside of my regular clinic.  11. I understand that this medicine can affect my thinking and judgment. It may be unsafe for me to drive, use machinery and do dangerous tasks. I will not do any of these things until I know how the medicine affects me. If my dose changes, I will wait to see how it affects me. I will contact my care team if I have concerns about medicine side effects.    I understand that if I do not follow any of the conditions above, my prescriptions or treatment may be stopped.      I agree that my provider, clinic care team, and pharmacy may work with any city, state or federal law enforcement agency that investigates the misuse, sale, or other diversion of my controlled medicine. I will allow my provider to discuss my care with or share a copy of this agreement with any other treating provider, pharmacy or emergency room where I receive care. I agree to give up (waive) any right of privacy or confidentiality with respect to these consents.     I have read this agreement and have asked questions about anything I did not understand.      ________________________________________________________________________  Patient signature - Date/Time -  Xavier Mcguire                                      ________________________________________________________________________  Witness signature                                                            ________________________________________________________________________  Provider signature - Lizett Terry MD      [id number]  Rev 12/18         Registration to scan to EHR                         Page 2 of 2                   Controlled Substance Agreement Opioid           Page 1 of 2  Opioid Pain Medicines (also known as Narcotics)  What You Need to Know    What are opioids?   Opioids are pain medicines that must be prescribed by a doctor.  They are also known as narcotics.    Examples are:     morphine (MS Contin,  Randi)    oxycodone (Oxycontin)    oxycodone and acetaminophen (Percocet)    hydrocodone and acetaminophen (Vicodin, Norco)     fentanyl patch (Duragesic)     hydromorphone (Dilaudid)     methadone     What do opioids do well?   Opioids are best for short-term pain after a surgery or injury. They also work well for cancer pain. Unlike other pain medicines, they do not cause liver or kidney failure or ulcers. They may help some people with long-lasting (chronic) pain.     What do opioids NOT do well?   Opioids never get rid of pain entirely, and they do not work well for most patients with chronic pain. Opioids do not reduce swelling, one of the causes of pain. They also don t work well for nerve pain.                           For informational purposes only.  Not to replace the advice of your care provider.  Copyright [facility]. All right reserved. [employer] 630871-Lie 02/18.      Page 2 of 2    Risks and side effects   Talk to your doctor before you start or decide to keep taking one of these medicines. Side effects include:    Lowering your breathing rate enough to cause death    Overdose, including death, especially if taking higher than prescribed doses    Long-term opioid use    Worse depression symptoms; less pleasure in things you usually enjoy    Feeling tired or sluggish    Slower thoughts or cloudy thinking    Being more sensitive to pain over time; pain is harder to control    Trouble sleeping or restless sleep    Changes in hormone levels (for example, less testosterone)    Changes in sex drive or ability to have sex    Constipation    Unsafe driving    Itching and sweating    Feeling dizzy    Nausea, vomiting and dry mouth    What else should I know about opioids?  When someone takes opioids for too long or too often, they become dependent. This means that if you stop or reduce the medicine too quickly, you will have withdrawal symptoms.    Dependence is not the same as addiction.  Addiction is when people keep using a substance that harms their body, their mind or their relations with others. If you have a history of drug or alcohol abuse, taking opioids can cause a relapse.    Over time, opioids don t work as well. Most people will need higher and higher doses. The higher the dose, the more serious the side effects. We don t know the long-term effects of opioids.      Prescribed opioids aren't the best way to manage chronic pain    Other ways to manage pain include:      Ibuprofen or acetaminophen.  You should always try this first.      Treat health problems that may be causing pain.      acupuncture or massage, deep breathing, meditation, visual imagery, aromatherapy.      Use heat or ice at the pain site      Physical therapy and exercise      Stop smoking      See a counselor or therapist                                                  People who have used opioids for a long time may have a lower quality of life, worse depression, higher levels of pain and more visits to doctors.    Never share your opioids with others. Be sure to store opioids in a secure place, locked if possible.Young children can easily swallow them and overdose.     You can overdose on opioids.  Signs of overdose include decrease or loss of consciousness, slowed breathing, trouble waking and blue lips.  If someone is worried about overdose, they should call 911.    If you are at risk for overdose, you may get naloxone (Narcan, a medicine that reverses the effects of opioids.  If you overdose, a friend or family member can give you Narcan while waiting for the ambulance.  They need to know the signs of overdose and how to give Narcan.    While you're taking opioids:    Don't use alcohol or street drugs. Taking them together can cause death.    Don't take any of these medicines unless your doctor says its okay.  Taking these with opioids can cause death.    Benzodiazepines (such as lorazepam         or  diazepam)    Muscle relaxers (such as cyclobenzaprine)    sleeping pills    other opioids    Safe disposal of opioids  Find your area drug take-back program, your pharmacy mail-back program, buy a special disposal bag (such as Deterra) from your pharmacy or flush them down the toilet.  Use the guidelines at:  www.fda.gov/drugs/resourcesforyou

## 2020-02-19 NOTE — PROGRESS NOTES
Subjective     Xavier Mcguire is a 49 year old male who presents to clinic today for the following health issues:    HPI   Diabetes Follow-up      How often are you checking your blood sugar? Not at all    What concerns do you have today about your diabetes? None     Do you have any of these symptoms? (Select all that apply)  No numbness or tingling in feet.  No redness, sores or blisters on feet.  No complaints of excessive thirst.  No reports of blurry vision.  No significant changes to weight.      BP Readings from Last 2 Encounters:   11/20/19 130/84   08/21/19 138/86     Hemoglobin A1C (%)   Date Value   08/21/2019 7.8 (H)   03/15/2019 8.0 (H)     LDL Cholesterol Calculated (mg/dL)   Date Value   06/06/2018 32   09/06/2017 49     LDL Cholesterol Direct (mg/dL)   Date Value   03/15/2019 74                 How many servings of fruits and vegetables do you eat daily?  0-1    On average, how many sweetened beverages do you drink each day (Examples: soda, juice, sweet tea, etc.  Do NOT count diet or artificially sweetened beverages)?   2    How many days per week do you exercise enough to make your heart beat faster? 3 or less    How many minutes a day do you exercise enough to make your heart beat faster? 9 or less  How many days per week do you miss taking your medication? 1    What makes it hard for you to take your medications?  remembering to take    SUBJECTIVE:  Here today in follow-up of diabetes, hypertension, chronic knee pain.  Last A1c in August was 7.8 which is the best it is been in a while.  But he admits that he has fallen off of diet and exercise.  Interestingly weight is actually down 7 pounds.  Home blood sugar readings are actually pretty decent per the patient.  But we again discussed the need for lifestyle management in addition to medications.  No new physical complaints.  Patient reports no side effects from medications, and desires no change in therapy.     Review of systems otherwise  "negative.  Past medical, family, and social history reviewed and updated in chart.    OBJECTIVE:  /83 (BP Location: Right arm, Patient Position: Sitting, Cuff Size: Adult Large)   Pulse 75   Temp 98.1  F (36.7  C) (Oral)   Resp 18   Ht 1.651 m (5' 5\")   Wt 98.8 kg (217 lb 14.4 oz)   SpO2 98%   BMI 36.26 kg/m    Alert, pleasant, upbeat, and in no apparent discomfort.  Obese  S1 and S2 normal, no murmurs, clicks, gallops or rubs. Regular rate and rhythm. Chest is clear; no wheezes or rales. No edema or JVD.  Past labs reviewed with the patient.     ASSESSMENT / PLAN:  (E11.65) Type 2 diabetes mellitus with hyperglycemia, without long-term current use of insulin (H)  (primary encounter diagnosis)  Comment: Recheck and adjust as needed.  Hopefully he is still in the reasonable range  Plan:     (E66.01) Severe obesity (BMI 35.0-39.9) with comorbidity (H)  Comment: Needs lifestyle improvement as above  Plan:     (I10) Essential hypertension with goal blood pressure less than 130/80  Comment: Well-controlled on current regimen  Plan:     (M25.561,  G89.29) Chronic pain of right knee  Comment: Stable and following  Plan:     (G89.4) Chronic pain syndrome  Comment: Updated controlled substance agreement today.  Routinely monitor through  database and will continue to push for reduction in usage  Plan:     (Z12.11) Special screening for malignant neoplasms, colon  Comment:   Plan: Fecal colorectal cancer screen (FIT),         GASTROENTEROLOGY ADULT REF PROCEDURE ONLY Krystle Wyatt [facility] [phone]; No Provider         Preference            Follow up 3 to 6 months based upon results  SBrian Terry MD    (Chart documentation completed in part with Dragon voice-recognition software.  Even though reviewed some grammatical, spelling, and word errors may remain.)       "

## 2020-02-21 ENCOUNTER — OFFICE VISIT (OUTPATIENT)
Dept: FAMILY MEDICINE | Facility: CLINIC | Age: 50
End: 2020-02-21
Payer: COMMERCIAL

## 2020-02-21 VITALS
BODY MASS INDEX: 35.82 KG/M2 | OXYGEN SATURATION: 98 % | WEIGHT: 215 LBS | HEIGHT: 65 IN | TEMPERATURE: 98 F | DIASTOLIC BLOOD PRESSURE: 84 MMHG | RESPIRATION RATE: 20 BRPM | HEART RATE: 96 BPM | SYSTOLIC BLOOD PRESSURE: 136 MMHG

## 2020-02-21 DIAGNOSIS — J01.00 ACUTE MAXILLARY SINUSITIS, RECURRENCE NOT SPECIFIED: Primary | ICD-10-CM

## 2020-02-21 DIAGNOSIS — R50.9 FEVER, UNSPECIFIED FEVER CAUSE: ICD-10-CM

## 2020-02-21 LAB
DEPRECATED S PYO AG THROAT QL EIA: NEGATIVE
FLUAV+FLUBV AG SPEC QL: NEGATIVE
FLUAV+FLUBV AG SPEC QL: NEGATIVE
SPECIMEN SOURCE: NORMAL
STREP GROUP A PCR: NOT DETECTED

## 2020-02-21 PROCEDURE — 40001204 ZZHCL STATISTIC STREP A RAPID: Performed by: FAMILY MEDICINE

## 2020-02-21 PROCEDURE — 87804 INFLUENZA ASSAY W/OPTIC: CPT | Performed by: FAMILY MEDICINE

## 2020-02-21 PROCEDURE — 87651 STREP A DNA AMP PROBE: CPT | Performed by: FAMILY MEDICINE

## 2020-02-21 PROCEDURE — 99214 OFFICE O/P EST MOD 30 MIN: CPT | Performed by: FAMILY MEDICINE

## 2020-02-21 RX ORDER — PREDNISONE 20 MG/1
40 TABLET ORAL DAILY
Qty: 10 TABLET | Refills: 0 | Status: SHIPPED | OUTPATIENT
Start: 2020-02-21 | End: 2020-05-04

## 2020-02-21 RX ORDER — FLUTICASONE PROPIONATE 50 MCG
1 SPRAY, SUSPENSION (ML) NASAL DAILY
Qty: 16 G | Refills: 1 | Status: SHIPPED | OUTPATIENT
Start: 2020-02-21 | End: 2021-05-18

## 2020-02-21 ASSESSMENT — PAIN SCALES - GENERAL: PAINLEVEL: NO PAIN (0)

## 2020-02-21 ASSESSMENT — MIFFLIN-ST. JEOR: SCORE: 1762.11

## 2020-02-21 NOTE — PATIENT INSTRUCTIONS
At [facility], we strive to deliver an exceptional experience to you, every time we see you.  If you receive a survey in the mail, please send us back your thoughts. We really do value your feedback.    Based on your medical history, these are the current health maintenance/preventive care services that you are due for (some may have been done at this visit.)  Health Maintenance Due   Topic Date Due     PREVENTIVE CARE VISIT  1970     COLONOSCOPY  03/29/1979     DIABETIC FOOT EXAM  06/06/2019     PHQ-2  01/01/2020     ZOSTER IMMUNIZATION (1 of 2) 03/29/2019     MICROALBUMIN  03/15/2020         Suggested websites for health information:  Www.[place].org : Up to date and easily searchable information on multiple topics.  Www.medlineplus.gov : medication info, interactive tutorials, watch real surgeries online  Www.familydoctor.org : good info from the Academy of Family Physicians  Www.cdc.gov : public health info, travel advisories, epidemics (H1N1)  Www.aap.org : children's health info, normal development, vaccinations  Www.health.[facility].[place].us : [place] dept of health, public health issues in [place], N1N1    Your care team:                            Family Medicine Internal Medicine   MD Andres Gonzalez MD Shantel Branch-Fleming, MD Katya Georgiev PA-C Nam Ho, MD Pediatrics   JOSE MARIA Cadet, MD Rizwana Taylor CNP, MD Deborah Mielke, MD Kim Thein, APRN [facility]      Clinic hours: Monday - Thursday 7 am-7 pm; Fridays 7 am-5 pm.   Urgent care: Monday - Friday 11 am-9 pm; Saturday and Sunday 9 am-5 pm.  Pharmacy : Monday -Thursday 8 am-8 pm; Friday 8 am-6 pm; Saturday and Sunday 9 am-5 pm.     Clinic: [phone]   Pharmacy: [phone]

## 2020-02-21 NOTE — PROGRESS NOTES
Subjective     Xavier Mcguire is a 49 year old male who presents to clinic today for the following health issues:    HPI   Acute Illness   Acute illness concerns: sinus, congestion, headache  Onset: yesterday    Fever: no    Chills/Sweats: no    Headache (location?): YES, having hard time remembering or difficulty to concentrate    Sinus Pressure:YES    Conjunctivitis:  YES: bilateral    Ear Pain: no, right feel pressure    Rhinorrhea: YES, increased sneezing    Congestion: YES    Sore Throat: YES     Cough: no    Wheeze: YES, a little of short of breath    Decreased Appetite: no    Nausea: no    Vomiting: no    Diarrhea:  no    Dysuria/Freq.: no    Fatigue/Achiness: YES    Sick/Strep Exposure: no     Therapies Tried and outcome: nyquil      Patient Active Problem List   Diagnosis     RAYMOND (obstructive sleep apnea)     CARDIOVASCULAR SCREENING; LDL GOAL LESS THAN 100     Erectile dysfunction, unspecified erectile dysfunction type     Chronic pain syndrome     Essential hypertension with goal blood pressure less than 130/80     Chronic pain of right knee     Type 2 diabetes mellitus with hyperglycemia, without long-term current use of insulin (H)     Severe obesity (BMI 35.0-39.9) with comorbidity (H)     Allergic rhinitis, unspecified seasonality, unspecified trigger     CARDIOVASCULAR SCREENING; LDL GOAL LESS THAN 70     History reviewed. No pertinent surgical history.    Social History     Tobacco Use     Smoking status: Never Smoker     Smokeless tobacco: Never Used   Substance Use Topics     Alcohol use: Yes     Alcohol/week: 3.3 standard drinks     Types: 4 Standard drinks or equivalent per week     Comment: social - heavy drinker on some weekends     Family History   Problem Relation Age of Onset     Diabetes Maternal Grandmother      Cerebrovascular Disease Maternal Grandmother      Asthma Son      Cerebrovascular Disease Brother         age 18     Diabetes Mother      Cancer Father 63        dad with  hip cancer. ? type      C.A.D. No family hx of      Hypertension No family hx of      Breast Cancer No family hx of      Cancer - colorectal No family hx of      Prostate Cancer No family hx of      Alcohol/Drug No family hx of      Allergies No family hx of          Current Outpatient Medications   Medication Sig Dispense Refill     amLODIPine (NORVASC) 10 MG tablet Take 1 tablet (10 mg) by mouth daily 90 tablet 1     amoxicillin-clavulanate (AUGMENTIN) 875-125 MG PO tablet Take 1 tablet by mouth 2 times daily for 10 days 20 tablet 0     aspirin 81 MG tablet Take 1 tablet by mouth daily. 100 tablet 3     blood glucose (ACCU-CHEK FIDEL) test strip Use to test blood sugar 1 times daily or as directed. 100 each 5     blood glucose (ACCU-CHEK FASTCLIX) lancing device Lancing device to be used with lancets. 1 each 0     blood glucose monitoring (ACCU-CHEK FIDEL PLUS) meter device kit Use to test blood sugar 1 times daily or as directed. 1 kit 0     blood glucose monitoring (ACCU-CHEK FASTCLIX) lancets Use to test blood sugar daily. 100 each 5     chlorthalidone (HYGROTON) 25 MG tablet Take 1 tablet (25 mg) by mouth daily 90 tablet 1     empagliflozin (JARDIANCE) 10 MG TABS tablet Take 1 tablet (10 mg) by mouth daily 90 tablet 1     fexofenadine (ALLEGRA) 180 MG tablet Take 1 tablet (180 mg) by mouth daily 90 tablet 1     fluticasone 50 MCG/ACT NA nasal spray Spray 1 spray into both nostrils daily 16 g 1     glimepiride (AMARYL) 4 MG tablet Take 1 tablet (4 mg) by mouth 2 times daily 180 tablet 1     ipratropium - albuterol 0.5 mg/2.5 mg/3 mL (DUONEB) 0.5-2.5 (3) MG/3ML nebulization Take 1 vial (3 mLs) by nebulization every 6 hours as needed for shortness of breath / dyspnea or wheezing 90 vial 0     losartan (COZAAR) 50 MG tablet Take 1 tablet (50 mg) by mouth daily 90 tablet 1     metFORMIN (GLUCOPHAGE) 1000 MG tablet Take 1 tablet (1,000 mg) by mouth 2 times daily (with meals) with breakfast and dinner. 180 tablet 1  "    oxyCODONE-acetaminophen 7.5-325 MG PO per tablet Take 1-2 tablets by mouth 3 times daily as needed for pain 150 tablet 0     pioglitazone (ACTOS) 45 MG tablet Take 1 tablet (45 mg) by mouth daily 90 tablet 1     predniSONE 20 MG PO tablet Take 2 tablets (40 mg) by mouth daily for 5 days 10 tablet 0     sildenafil (REVATIO) 20 MG tablet TAKE 5 TABLETS BY MOUTH 30 MINUTES TO 4 HOURS BEFORE SEX 90 tablet 0     STATIN NOT PRESCRIBED, INTENTIONAL, continuous prn for other Statin not prescribed intentionally due to Other low LDL  (This option does not exclude patient from measure)  0     Allergies   Allergen Reactions     Lisinopril Other (See Comments)     BP Readings from Last 3 Encounters:   02/21/20 136/84   02/19/20 136/83   11/20/19 130/84    Wt Readings from Last 3 Encounters:   02/21/20 97.5 kg (215 lb)   02/19/20 98.8 kg (217 lb 14.4 oz)   11/20/19 100.7 kg (222 lb)               Reviewed and updated as needed this visit by Provider         Review of Systems   ROS COMP: Constitutional, HEENT, cardiovascular, pulmonary, GI, , musculoskeletal, neuro, skin, endocrine and psych systems are negative, except as otherwise noted.      Objective    /84   Pulse 96   Temp 98  F (36.7  C) (Oral)   Resp 20   Ht 1.651 m (5' 5\")   Wt 97.5 kg (215 lb)   SpO2 98%   BMI 35.78 kg/m    Body mass index is 35.78 kg/m .  Physical Exam   GENERAL: healthy, alert and no distress  NECK: no adenopathy, no asymmetry, masses, or scars and thyroid normal to palpation  RESP: lungs clear to auscultation - no rales, rhonchi or wheezes  CV: regular rate and rhythm, normal S1 S2, no S3 or S4, no murmur, click or rub, no peripheral edema and peripheral pulses strong  ABDOMEN: soft, nontender, no hepatosplenomegaly, no masses and bowel sounds normal  MS: no gross musculoskeletal defects noted, no edema    Diagnostic Test Results:  Labs reviewed in Epic        Assessment & Plan     1. Acute maxillary sinusitis, recurrence not " "specified  Likely viral. Discussed usual course. Treat with nasal topical steroid and burst of oral steroid to help with congestion. If symptoms worsen and do no improve in about 1 week, okay to start abx. RTC if no improvements.  - fluticasone 50 MCG/ACT NA nasal spray; Spray 1 spray into both nostrils daily  Dispense: 16 g; Refill: 1  - predniSONE 20 MG PO tablet; Take 2 tablets (40 mg) by mouth daily for 5 days  Dispense: 10 tablet; Refill: 0  - amoxicillin-clavulanate (AUGMENTIN) 875-125 MG PO tablet; Take 1 tablet by mouth 2 times daily for 10 days  Dispense: 20 tablet; Refill: 0    2. Fever, unspecified fever cause  Rapid strep negative. Flu negative.  - Influenza A/B antigen  - Streptococcus A Rapid Scr w Reflx to PCR  - Group A Streptococcus PCR Throat Swab     BMI:   Estimated body mass index is 35.78 kg/m  as calculated from the following:    Height as of this encounter: 1.651 m (5' 5\").    Weight as of this encounter: 97.5 kg (215 lb).           See Patient Instructions    Return in about 6 months (around 8/21/2020) for Physical Exam.    Ventura Garcia MD, MD  [facility]      "

## 2020-02-21 NOTE — RESULT ENCOUNTER NOTE
Xavier,  A1c up a little (8.1) but not as bad as feared.  Get off the pop and have a better diet and this should get better.  We can recheck in a few months.  STEW Terry M.D.

## 2020-02-24 ENCOUNTER — HEALTH MAINTENANCE LETTER (OUTPATIENT)
Age: 50
End: 2020-02-24

## 2020-03-16 DIAGNOSIS — G89.4 CHRONIC PAIN SYNDROME: ICD-10-CM

## 2020-03-16 DIAGNOSIS — M25.561 CHRONIC PAIN OF RIGHT KNEE: ICD-10-CM

## 2020-03-16 DIAGNOSIS — G89.29 CHRONIC PAIN OF RIGHT KNEE: ICD-10-CM

## 2020-03-16 NOTE — TELEPHONE ENCOUNTER
Reason for Call:  Other prescription    Detailed comments: Pt would like to request the following medication for refill. Thank you.    oxyCODONE-acetaminophen 7.5-325 MG PO per tablet    [id number] - [facility] [address] AT [facility]  [phone]    Phone Number Patient can be reached at: Home number on file [phone] (home)    Best Time: Any    Can we leave a detailed message on this number? YES    Call taken on 3/16/2020 at 8:12 AM by Tonja Gandhi

## 2020-03-16 NOTE — TELEPHONE ENCOUNTER
Requested Prescriptions   Pending Prescriptions Disp Refills     oxyCODONE-acetaminophen (PERCOCET) 7.5-325 MG per tablet 150 tablet 0     Sig: Take 1-2 tablets by mouth 3 times daily as needed for pain       There is no refill protocol information for this order          oxyCODONE-acetaminophen (PERCOCET) 7.5-325 MG per tablet      Last Written Prescription Date:  2/19/2020  Last Fill Quantity: 150,   # refills: 0  Last Office Visit: 2/21/2020  Future Office visit:    Next 5 appointments (look out 90 days)    May 04, 2020  9:00 AM CDT  Telephone Visit with Lizett Terry MD  [facility] [address] [place] [id number] [phone]           Routing refill request to provider for review/approval because:  Drug not on the [facility], [facility] or [facility] refill protocol or controlled substance

## 2020-03-17 RX ORDER — OXYCODONE AND ACETAMINOPHEN 7.5; 325 MG/1; MG/1
1-2 TABLET ORAL 3 TIMES DAILY PRN
Qty: 150 TABLET | Refills: 0 | Status: SHIPPED | OUTPATIENT
Start: 2020-03-17 | End: 2020-04-16

## 2020-03-17 NOTE — TELEPHONE ENCOUNTER
Controlled Substance Refill Request for oxycodone-acteminophen  Problem List Complete:  Yes  Overview Addendum 3/17/2020  1:56 PM by Cris Lema RN    Patient is followed by DIMITRI GUZMAN for ongoing prescription of pain medication.  All refills should be approved by this provider, or covering partner.     Medication(s): percocet 7.5.   Maximum quantity per month: 150 = 56.25 MED  Narcan n/a  Clinic visit frequency required: Q 3  months      Controlled substance agreement on file: Yes       Date(s): 2/19/2020     Pain Clinic evaluation in the past: No     DIRE Total Score(s):    8/10/2015   Total Score 20         Last [facility] website verification:  03/17/2020       Cris Lema RN, [facility] Triage

## 2020-04-14 DIAGNOSIS — G89.29 CHRONIC PAIN OF RIGHT KNEE: ICD-10-CM

## 2020-04-14 DIAGNOSIS — M25.561 CHRONIC PAIN OF RIGHT KNEE: ICD-10-CM

## 2020-04-14 DIAGNOSIS — G89.4 CHRONIC PAIN SYNDROME: ICD-10-CM

## 2020-04-14 NOTE — TELEPHONE ENCOUNTER
Reason for call:  Medication   If this is a refill request, has the caller requested the refill from the pharmacy already? No  Will the patient be using a [facility]? No  Name of the pharmacy and phone number for the current request: [facility]     Name of the medication requested: OXYCODONE    Other request:     Phone number to reach patient:  Home number on file [phone] (home)    Best Time:  ANY    Can we leave a detailed message on this number?  YES    Travel screening: Negative

## 2020-04-14 NOTE — TELEPHONE ENCOUNTER
Requested Prescriptions   Pending Prescriptions Disp Refills     oxyCODONE-acetaminophen (PERCOCET) 7.5-325 MG per tablet        Last Written Prescription Date:  3/17/20  Last Fill Quantity: 150 tablet,   # refills: 0  Last Office Visit: Dr. Terry  Future Office visit:    Next 5 appointments (look out 90 days)    May 04, 2020  9:00 AM CDT  Telephone Visit with Lizett Terry MD  [facility] [address] [place] [id number] [phone]           Routing refill request to provider for review/approval because:  Drug not on the [facility], UMP or  Health refill protocol or controlled substance   150 tablet 0     Sig: Take 1-2 tablets by mouth 3 times daily as needed for pain       There is no refill protocol information for this order

## 2020-04-16 RX ORDER — OXYCODONE AND ACETAMINOPHEN 7.5; 325 MG/1; MG/1
1-2 TABLET ORAL 3 TIMES DAILY PRN
Qty: 150 TABLET | Refills: 0 | Status: SHIPPED | OUTPATIENT
Start: 2020-04-16 | End: 2020-05-04

## 2020-04-16 NOTE — TELEPHONE ENCOUNTER
Patient needs his percocet as he will run out this weekend.  Explained to patient it can take up to 72 hours to complete his refill request.

## 2020-05-04 ENCOUNTER — VIRTUAL VISIT (OUTPATIENT)
Dept: FAMILY MEDICINE | Facility: CLINIC | Age: 50
End: 2020-05-04
Payer: COMMERCIAL

## 2020-05-04 VITALS
HEART RATE: 83 BPM | SYSTOLIC BLOOD PRESSURE: 169 MMHG | BODY MASS INDEX: 35.78 KG/M2 | DIASTOLIC BLOOD PRESSURE: 102 MMHG | HEIGHT: 65 IN

## 2020-05-04 DIAGNOSIS — G89.29 CHRONIC PAIN OF RIGHT KNEE: ICD-10-CM

## 2020-05-04 DIAGNOSIS — G89.4 CHRONIC PAIN SYNDROME: ICD-10-CM

## 2020-05-04 DIAGNOSIS — E11.65 TYPE 2 DIABETES MELLITUS WITH HYPERGLYCEMIA, WITHOUT LONG-TERM CURRENT USE OF INSULIN (H): Primary | ICD-10-CM

## 2020-05-04 DIAGNOSIS — Z12.11 SPECIAL SCREENING FOR MALIGNANT NEOPLASMS, COLON: ICD-10-CM

## 2020-05-04 DIAGNOSIS — J30.9 ALLERGIC RHINITIS, UNSPECIFIED SEASONALITY, UNSPECIFIED TRIGGER: ICD-10-CM

## 2020-05-04 DIAGNOSIS — M25.561 CHRONIC PAIN OF RIGHT KNEE: ICD-10-CM

## 2020-05-04 DIAGNOSIS — I10 ESSENTIAL HYPERTENSION WITH GOAL BLOOD PRESSURE LESS THAN 130/80: ICD-10-CM

## 2020-05-04 PROCEDURE — 99214 OFFICE O/P EST MOD 30 MIN: CPT | Mod: TEL | Performed by: FAMILY MEDICINE

## 2020-05-04 RX ORDER — PIOGLITAZONEHYDROCHLORIDE 45 MG/1
45 TABLET ORAL DAILY
Qty: 90 TABLET | Refills: 1 | Status: SHIPPED | OUTPATIENT
Start: 2020-05-04 | End: 2020-09-23

## 2020-05-04 RX ORDER — FEXOFENADINE HCL 180 MG/1
180 TABLET ORAL DAILY
Qty: 90 TABLET | Refills: 1 | Status: SHIPPED | OUTPATIENT
Start: 2020-05-04 | End: 2021-05-18

## 2020-05-04 RX ORDER — LOSARTAN POTASSIUM 50 MG/1
50 TABLET ORAL DAILY
Qty: 90 TABLET | Refills: 1 | Status: SHIPPED | OUTPATIENT
Start: 2020-05-04 | End: 2020-09-23

## 2020-05-04 RX ORDER — OXYCODONE AND ACETAMINOPHEN 7.5; 325 MG/1; MG/1
1-2 TABLET ORAL 3 TIMES DAILY PRN
Qty: 150 TABLET | Refills: 0 | Status: SHIPPED | OUTPATIENT
Start: 2020-05-15 | End: 2020-06-16

## 2020-05-04 RX ORDER — CHLORTHALIDONE 25 MG/1
25 TABLET ORAL DAILY
Qty: 90 TABLET | Refills: 1 | Status: SHIPPED | OUTPATIENT
Start: 2020-05-04 | End: 2020-09-23

## 2020-05-04 RX ORDER — GLIMEPIRIDE 4 MG/1
4 TABLET ORAL 2 TIMES DAILY
Qty: 180 TABLET | Refills: 1 | Status: SHIPPED | OUTPATIENT
Start: 2020-05-04 | End: 2020-09-23

## 2020-05-04 RX ORDER — AMLODIPINE BESYLATE 10 MG/1
10 TABLET ORAL DAILY
Qty: 90 TABLET | Refills: 1 | Status: SHIPPED | OUTPATIENT
Start: 2020-05-04 | End: 2020-09-23

## 2020-05-04 ASSESSMENT — PAIN SCALES - GENERAL: PAINLEVEL: NO PAIN (0)

## 2020-05-04 NOTE — PATIENT INSTRUCTIONS
At [facility], we strive to deliver an exceptional experience to you, every time we see you. If you receive a survey, please complete it as we do value your feedback.  If you have MyChart, you can expect to receive results automatically within 24 hours of their completion.  Your provider will send a note interpreting your results as well.   If you do not have MyChart, you should receive your results in about a week by mail.    Your care team:                            Family Medicine Internal Medicine   MD Andres Gonzalez MD Shantel Branch-Fleming, MD Katya Georgiev PA-C Megan Hill, APRANTONIO Garcia, MD Pediatrics   Buddy Kaminski, PAFABIO Sanabria, MD Valencia Griffiths APRN CNP   MD Rizwana Graves MD Deborah Mielke, MD Kim Thein, APRN [facility]      Clinic hours: Monday - Thursday 7 am-7 pm; Fridays 7 am-5 pm.   Urgent care: Monday - Friday 11 am-9 pm; Saturday and Sunday 9 am-5 pm.    Clinic: [phone]       [facility]: Monday - Thursday 8 am - 7 pm; Friday 8 am - 6 pm  [facility]: [phone]     Use www.oncare.org for 24/7 diagnosis and treatment of dozens of conditions.

## 2020-06-12 ENCOUNTER — TELEPHONE (OUTPATIENT)
Dept: FAMILY MEDICINE | Facility: CLINIC | Age: 50
End: 2020-06-12

## 2020-06-12 DIAGNOSIS — M25.561 CHRONIC PAIN OF RIGHT KNEE: ICD-10-CM

## 2020-06-12 DIAGNOSIS — G89.29 CHRONIC PAIN OF RIGHT KNEE: ICD-10-CM

## 2020-06-12 DIAGNOSIS — G89.4 CHRONIC PAIN SYNDROME: ICD-10-CM

## 2020-06-12 NOTE — TELEPHONE ENCOUNTER
Can you refill his Percocet to [facility] [address] this time    Thank you    Call if questions [phone] ok to leave message

## 2020-06-16 RX ORDER — OXYCODONE AND ACETAMINOPHEN 7.5; 325 MG/1; MG/1
1-2 TABLET ORAL 3 TIMES DAILY PRN
Qty: 150 TABLET | Refills: 0 | Status: SHIPPED | OUTPATIENT
Start: 2020-06-16 | End: 2020-07-15

## 2020-06-16 NOTE — TELEPHONE ENCOUNTER
Routing refill request to provider for review/approval because:  Drug not on the [facility] refill protocol     Controlled Substance Refill Request for Percocet  Problem List Complete:  Yes   checked in past 3 months?  Yes  6/16/20     Patient is followed by DMIITRI GUZMAN for ongoing prescription of pain medication.  All refills should be approved by this provider, or covering partner.     Medication(s): percocet 7.5.   Maximum quantity per month: 150 = 56.25 MED  Narcan n/a  Clinic visit frequency required: Q 3  months      Controlled substance agreement on file: Yes       Date(s): 2/19/2020     Pain Clinic evaluation in the past: No     DIRE Total Score(s):    8/10/2015   Total Score 20         Last [place] website verification:  6/16/2020    RX monitoring program ([place]) reviewed:  reviewed- no concerns    [place] profile:  [email or web address]

## 2020-06-16 NOTE — TELEPHONE ENCOUNTER
Left message informing pt RX sent to preferred pharmacy and that pt is due for f/u in July/Aug with PCP    BRETT Devine.

## 2020-06-16 NOTE — TELEPHONE ENCOUNTER
Prescription refilled per problem list . Please assist with scheduling follow up with Dr. Terry in July or August

## 2020-07-13 DIAGNOSIS — G89.4 CHRONIC PAIN SYNDROME: ICD-10-CM

## 2020-07-13 DIAGNOSIS — G89.29 CHRONIC PAIN OF RIGHT KNEE: ICD-10-CM

## 2020-07-13 DIAGNOSIS — M25.561 CHRONIC PAIN OF RIGHT KNEE: ICD-10-CM

## 2020-07-13 NOTE — TELEPHONE ENCOUNTER
Pending Prescriptions:                       Disp   Refills    oxyCODONE-acetaminophen (PERCOCET) 7.5-32*150 ta*0            Sig: Take 1-2 tablets by mouth 3 times daily as needed           for pain

## 2020-07-15 RX ORDER — OXYCODONE AND ACETAMINOPHEN 7.5; 325 MG/1; MG/1
1-2 TABLET ORAL 3 TIMES DAILY PRN
Qty: 150 TABLET | Refills: 0 | Status: SHIPPED | OUTPATIENT
Start: 2020-07-15 | End: 2020-08-18

## 2020-07-15 NOTE — TELEPHONE ENCOUNTER
Controlled Substance Refill Request for Percocet  Problem List Complete:  Yes  Chronic pain syndrome   Problem Detail     Noted:  3/14/2016    Priority:  Medium    Overview Addendum 6/16/2020  8:33 AM by Erika Killian RN    Patient is followed by DIMITRI GUZMAN for ongoing prescription of pain medication.  All refills should be approved by this provider, or covering partner.     Medication(s): percocet 7.5.   Maximum quantity per month: 150 = 56.25 MED  Narcan n/a  Clinic visit frequency required: Q 3  months      Controlled substance agreement on file: Yes       Date(s): 2/19/2020     Pain Clinic evaluation in the past: No     DIRE Total Score(s):    8/10/2015   Total Score 20         Last [place] website verification:  6/16/2020   [email or web address]     checked in past 3 months?  Yes 6/16/20   RX monitoring program ([place]) reviewed:  not reviewed/not due - last done on 6/16/20  [place] profile:  [email or web address]  Last Written Prescription Date:  6/16/20  Last Fill Quantity: 150 tablets  # refills: 0   Last office visit: 2/19/2020 with prescribing provider:  2/19/20   Future Office Visit:  None        Lida Olsen RN, BSN, PHN

## 2020-08-14 DIAGNOSIS — G89.4 CHRONIC PAIN SYNDROME: ICD-10-CM

## 2020-08-14 DIAGNOSIS — M25.561 CHRONIC PAIN OF RIGHT KNEE: ICD-10-CM

## 2020-08-14 DIAGNOSIS — E11.65 TYPE 2 DIABETES MELLITUS WITH HYPERGLYCEMIA, WITHOUT LONG-TERM CURRENT USE OF INSULIN (H): ICD-10-CM

## 2020-08-14 DIAGNOSIS — G89.29 CHRONIC PAIN OF RIGHT KNEE: ICD-10-CM

## 2020-08-14 LAB
AMPHETAMINES UR QL: NOT DETECTED NG/ML
BARBITURATES UR QL SCN: NOT DETECTED NG/ML
BENZODIAZ UR QL SCN: NOT DETECTED NG/ML
BUPRENORPHINE UR QL: NOT DETECTED NG/ML
CANNABINOIDS UR QL: NOT DETECTED NG/ML
COCAINE UR QL SCN: NOT DETECTED NG/ML
CREAT UR-MCNC: 194 MG/DL
D-METHAMPHET UR QL: NOT DETECTED NG/ML
HBA1C MFR BLD: 9.4 % (ref 0–5.6)
METHADONE UR QL SCN: NOT DETECTED NG/ML
MICROALBUMIN UR-MCNC: 21 MG/L
MICROALBUMIN/CREAT UR: 10.72 MG/G CR (ref 0–17)
OPIATES UR QL SCN: NOT DETECTED NG/ML
OXYCODONE UR QL SCN: ABNORMAL NG/ML
PCP UR QL SCN: NOT DETECTED NG/ML
PROPOXYPH UR QL: NOT DETECTED NG/ML
TRICYCLICS UR QL SCN: NOT DETECTED NG/ML

## 2020-08-14 PROCEDURE — 80306 DRUG TEST PRSMV INSTRMNT: CPT | Performed by: FAMILY MEDICINE

## 2020-08-14 PROCEDURE — 82043 UR ALBUMIN QUANTITATIVE: CPT | Performed by: FAMILY MEDICINE

## 2020-08-14 PROCEDURE — 36415 COLL VENOUS BLD VENIPUNCTURE: CPT | Performed by: FAMILY MEDICINE

## 2020-08-14 PROCEDURE — 83036 HEMOGLOBIN GLYCOSYLATED A1C: CPT | Performed by: FAMILY MEDICINE

## 2020-08-17 NOTE — RESULT ENCOUNTER NOTE
Xavier Santiago,  Sorry for the delay - just got back from vacation.  Well, that sugar is even worse than before.  Not sure what to say.  I think this all comes down to poor diet and a lack of exercise.  I cannot fix that for you.    Your urine screen was normal and as expected - showing the medication as prescribed.  As part of [place]'s new monitoring policy we will periodically recheck this test.  STEW Terry M.D.

## 2020-08-18 RX ORDER — OXYCODONE AND ACETAMINOPHEN 7.5; 325 MG/1; MG/1
1-2 TABLET ORAL 3 TIMES DAILY PRN
Qty: 150 TABLET | Refills: 0 | Status: SHIPPED | OUTPATIENT
Start: 2020-08-18 | End: 2020-09-18

## 2020-08-18 NOTE — TELEPHONE ENCOUNTER
Routing refill request to provider for review/approval because:  Drug not on the [facility] refill protocol     Controlled Substance Refill Request for Norco  Problem List Complete:  Yes   checked in past 3 months?  Yes  6/16/20     Patient is followed by DIMITRI GUZMAN for ongoing prescription of pain medication.  All refills should be approved by this provider, or covering partner.     Medication(s): percocet 7.5.   Maximum quantity per month: 150 = 56.25 MED  Narcan n/a  Clinic visit frequency required: Q 3  months      Controlled substance agreement on file: Yes       Date(s): 2/19/2020     Pain Clinic evaluation in the past: No     DIRE Total Score(s):    8/10/2015   Total Score 20         Last [place] website verification:  6/16/2020    RX [place] program ([place]) reviewed:  not reviewed/not due - last done on 6/16/20    [place] profile:  [email or web address]      Erika Killian RN  [facility]

## 2020-09-15 DIAGNOSIS — G89.4 CHRONIC PAIN SYNDROME: ICD-10-CM

## 2020-09-15 DIAGNOSIS — M25.561 CHRONIC PAIN OF RIGHT KNEE: ICD-10-CM

## 2020-09-15 DIAGNOSIS — G89.29 CHRONIC PAIN OF RIGHT KNEE: ICD-10-CM

## 2020-09-15 RX ORDER — OXYCODONE AND ACETAMINOPHEN 7.5; 325 MG/1; MG/1
1-2 TABLET ORAL 3 TIMES DAILY PRN
Qty: 150 TABLET | Refills: 0 | OUTPATIENT
Start: 2020-09-15

## 2020-09-15 NOTE — TELEPHONE ENCOUNTER
Controlled Substance Refill Request for Percocet  Problem List Complete:  Yes   Chronic pain syndrome   Problem Detail     Noted:  3/14/2016    Priority:  Medium    Overview Addendum 9/15/2020  9:59 AM by Lida Olsen RN    Patient is followed by DIMITRI GUZMAN for ongoing prescription of pain medication.  All refills should be approved by this provider, or covering partner.     Medication(s): percocet 7.5.   Maximum quantity per month: 150 = 56.25 MED  Narcan n/a  Clinic visit frequency required: Q 3  months      Controlled substance agreement on file: Yes       Date(s): 2/19/2020     Pain Clinic evaluation in the past: No     DIRE Total Score(s):    8/10/2015   Total Score 20         Last [facility] website verification:  09/15/20    [email or web address]     checked in past 3 months?  Yes 9/15/20  Last Written Prescription Date:  8/18/20  Last Fill Quantity: 150 tablets  # refills: 0   Last office visit: 2/19/2020 with prescribing provider:  5/4/20   Future Office Visit: None          Lida Olsen RN, BSN, PHN

## 2020-09-15 NOTE — TELEPHONE ENCOUNTER
Reason for Call:  Medication or medication refill:    Do you use a [facility]?  Name of the pharmacy and phone number for the current request:    [facility]    Telephone  Fax    [phone]      Name of the medication requested:  oxyCODONE-acetaminophen (PERCOCET) 7.5-325 MG per tablet   Other request: NA/  Can we leave a detailed message on this number? YES    Phone number patient can be reached at: Cell number on file:    Telephone Information:   Mobile [phone]       Best Time: ANYTIME    Call taken on 9/15/2020 at 9:07 AM by Ankita Woods

## 2020-09-16 NOTE — TELEPHONE ENCOUNTER
This writer attempted to contact pt on 09/16/20      Reason for call schedule virtual or OV and unable to leave message, VM full.      If patient calls back:   Schedule virutal or office appointment within 2 weeks with PCP, document that pt called and close encounter         Melani Rodriguez MA

## 2020-09-17 NOTE — TELEPHONE ENCOUNTER
This writer attempted to contact pt on 09/17/20        Reason for call schedule virtual or OV and unable to leave message, VM full.        If patient calls back:              Schedule virutal or office appointment within 2 weeks with PCP, document that pt called and close encounter            Melani Rodriguez MA

## 2020-09-17 NOTE — TELEPHONE ENCOUNTER
Xavier returned call and scheduled a virtual visit for 9.23 with Dr. Terry.    Best number to reach caller: Cell number on file:    Telephone Information:   Mobile [phone]       Is it ok to leave a detailed message: YES

## 2020-09-18 RX ORDER — OXYCODONE AND ACETAMINOPHEN 7.5; 325 MG/1; MG/1
1-2 TABLET ORAL 3 TIMES DAILY PRN
Qty: 150 TABLET | Refills: 0 | Status: SHIPPED | OUTPATIENT
Start: 2020-09-18 | End: 2020-09-23

## 2020-09-23 ENCOUNTER — VIRTUAL VISIT (OUTPATIENT)
Dept: FAMILY MEDICINE | Facility: CLINIC | Age: 50
End: 2020-09-23
Payer: COMMERCIAL

## 2020-09-23 DIAGNOSIS — E11.65 TYPE 2 DIABETES MELLITUS WITH HYPERGLYCEMIA, WITHOUT LONG-TERM CURRENT USE OF INSULIN (H): Primary | ICD-10-CM

## 2020-09-23 DIAGNOSIS — G89.29 CHRONIC PAIN OF RIGHT KNEE: ICD-10-CM

## 2020-09-23 DIAGNOSIS — I10 ESSENTIAL HYPERTENSION WITH GOAL BLOOD PRESSURE LESS THAN 130/80: ICD-10-CM

## 2020-09-23 DIAGNOSIS — G89.4 CHRONIC PAIN SYNDROME: ICD-10-CM

## 2020-09-23 DIAGNOSIS — M25.561 CHRONIC PAIN OF RIGHT KNEE: ICD-10-CM

## 2020-09-23 PROCEDURE — 99214 OFFICE O/P EST MOD 30 MIN: CPT | Mod: TEL | Performed by: FAMILY MEDICINE

## 2020-09-23 RX ORDER — LOSARTAN POTASSIUM 50 MG/1
50 TABLET ORAL DAILY
Qty: 90 TABLET | Refills: 1 | Status: SHIPPED | OUTPATIENT
Start: 2020-09-23 | End: 2021-02-16

## 2020-09-23 RX ORDER — METFORMIN HCL 500 MG
2000 TABLET, EXTENDED RELEASE 24 HR ORAL
Qty: 360 TABLET | Refills: 1 | Status: SHIPPED | OUTPATIENT
Start: 2020-09-23 | End: 2021-02-16

## 2020-09-23 RX ORDER — AMLODIPINE BESYLATE 10 MG/1
10 TABLET ORAL DAILY
Qty: 90 TABLET | Refills: 1 | Status: SHIPPED | OUTPATIENT
Start: 2020-09-23 | End: 2021-02-16

## 2020-09-23 RX ORDER — CHLORTHALIDONE 25 MG/1
25 TABLET ORAL DAILY
Qty: 90 TABLET | Refills: 1 | Status: SHIPPED | OUTPATIENT
Start: 2020-09-23 | End: 2021-02-16

## 2020-09-23 RX ORDER — GLIMEPIRIDE 4 MG/1
8 TABLET ORAL DAILY
Qty: 180 TABLET | Refills: 1 | Status: SHIPPED | OUTPATIENT
Start: 2020-09-23 | End: 2021-02-16

## 2020-09-23 RX ORDER — PIOGLITAZONEHYDROCHLORIDE 45 MG/1
45 TABLET ORAL DAILY
Qty: 90 TABLET | Refills: 1 | Status: SHIPPED | OUTPATIENT
Start: 2020-09-23 | End: 2021-02-16

## 2020-09-23 RX ORDER — OXYCODONE AND ACETAMINOPHEN 7.5; 325 MG/1; MG/1
1-2 TABLET ORAL 3 TIMES DAILY PRN
Qty: 150 TABLET | Refills: 0 | Status: SHIPPED | OUTPATIENT
Start: 2020-10-16 | End: 2020-11-17

## 2020-09-23 NOTE — PROGRESS NOTES
"Xavier Mcguire is a 50 year old male who is being evaluated via a billable telephone visit.      The patient has been notified of following:     \"This telephone visit will be conducted via a call between you and your physician/provider. We have found that certain health care needs can be provided without the need for a physical exam.  This service lets us provide the care you need with a short phone conversation.  If a prescription is necessary we can send it directly to your pharmacy.  If lab work is needed we can place an order for that and you can then stop by our lab to have the test done at a later time.    Telephone visits are billed at different rates depending on your insurance coverage. During this emergency period, for some insurers they may be billed the same as an in-person visit.  Please reach out to your insurance provider with any questions.    If during the course of the call the physician/provider feels a telephone visit is not appropriate, you will not be charged for this service.\"    Patient has given verbal consent for Telephone visit?  Yes    What phone number would you like to be contacted at? [phone]    How would you like to obtain your AVS? Lamar Dupree     Xavier Mcguire is a 50 year old male who presents via phone visit today for the following health issues:    HPI    Diabetes Follow-up      How often are you checking your blood sugar? Not at all    What concerns do you have today about your diabetes? None     Do you have any of these symptoms? (Select all that apply)  No numbness or tingling in feet.  No redness, sores or blisters on feet.  No complaints of excessive thirst.  No reports of blurry vision.  No significant changes to weight.      BP Readings from Last 2 Encounters:   05/04/20 (!) 169/102   02/21/20 136/84     Hemoglobin A1C (%)   Date Value   08/14/2020 9.4 (H)   02/19/2020 8.1 (H)     LDL Cholesterol Calculated (mg/dL)   Date Value   02/19/2020 65 "   06/06/2018 32     LDL Cholesterol Direct (mg/dL)   Date Value   03/15/2019 74               Chronic Pain Follow-Up    Where in your body do you have pain? Right knee is the worst  How has your pain affected your ability to work? Pain does not limit ability to work  Which of these pain treatments have you tried since your last clinic visit? Other: None  How well are you sleeping? Good  How has your mood been since your last visit? About the same  Have you had a significant life event? No  Other aggravating factors: prolonged sitting  Taking medication as directed? Yes    PHQ-9 SCORE 6/6/2018 12/6/2018 6/12/2019   PHQ-9 Total Score - - -   PHQ-9 Total Score 2 3 3     MIKE-7 SCORE 12/4/2017 6/6/2018 6/12/2019   Total Score - - -   Total Score 0 1 1     No flowsheet data found.  Encounter-Level CSA - 08/10/2015:    Controlled Substance Agreement - Scan on 8/12/2015  1:06 PM:  [place] CONTROLLED SUBSTANCE AGREEMENT     Patient-Level CSA:    Controlled Substance Agreement - Opioid - Scan on 12/19/2018  1:21 PM         How many servings of fruits and vegetables do you eat daily?  0-1    On average, how many sweetened beverages do you drink each day (Examples: soda, juice, sweet tea, etc.  Do NOT count diet or artificially sweetened beverages)?   2    How many days per week do you exercise enough to make your heart beat faster? 3 or less    How many minutes a day do you exercise enough to make your heart beat faster? 9 or less  How many days per week do you miss taking your medication? 2    What makes it hard for you to take your medications?  remembering to take    Spoke with patient via phone in follow-up of diabetes and hypertension.  Disappointingly his A1c went up to 9.4 recently.  Compliance is always been an issue with the patient, primarily dietary compliance.  Says he is generally pretty good with his medication though he tends to take it in the middle of the day.  But he admits that he has been drinking a lot  of sugared pop lately and that is always been his advice and his problem.  Stopped this about a month ago when he saw the results and thinks he is actually down a little bit in weight since.  The best we have seen his A1c was 7.8 about a year ago so I know the medications can work.  The patient needs to stay active and work on his diet.  So we discussed low carbohydrate high-protein diets in general today and I directed him to a lot of the online websites and recipes that are out there.  Also discussed flu shot and patient plans to get that very soon.  From a pain standpoint things are stable.  Due to update his controlled substance agreement but he is up-to-date on urine drug screen.    Review of Systems   Constitutional, HEENT, cardiovascular, pulmonary, gi and gu systems are negative, except as otherwise noted.       Objective          Vitals:  No vitals were obtained today due to virtual visit.    healthy, alert and no distress  PSYCH: Alert and oriented times 3; coherent speech, normal   rate and volume, able to articulate logical thoughts, able   to abstract reason, no tangential thoughts, no hallucinations   or delusions  His affect is normal  RESP: No cough, no audible wheezing, able to talk in full sentences  Remainder of exam unable to be completed due to telephone visits    Past labs reviewed with the patient.         Assessment/Plan:    Assessment & Plan     Type 2 diabetes mellitus with hyperglycemia, without long-term current use of insulin (H)  Not well controlled at all and patient diet and lifestyle is really the biggest .  Her only other move is to go to injectable therapy so we discussed that today.  One thing we can do is have him take both of his glimepiride at the same time and change to once daily metformin and take that all at the same time.  This might help as well.  Plan to recheck in 2 to 3 months  - empagliflozin (JARDIANCE) 10 MG TABS tablet; Take 1 tablet (10 mg) by mouth daily  -  "glimepiride (AMARYL) 4 MG tablet; Take 2 tablets (8 mg) by mouth daily  - metFORMIN (GLUCOPHAGE-XR) 500 MG 24 hr tablet; Take 4 tablets (2,000 mg) by mouth daily (with dinner)  - pioglitazone (ACTOS) 45 MG tablet; Take 1 tablet (45 mg) by mouth daily  - **A1C FUTURE anytime; Future  - **Comprehensive metabolic panel FUTURE anytime; Future  - Lipid panel reflex to direct LDL Fasting; Future  - Albumin Random Urine Quantitative with Creat Ratio; Future    Essential hypertension with goal blood pressure less than 130/80  Continuing to monitor.  Continue same dosage  - amLODIPine (NORVASC) 10 MG tablet; Take 1 tablet (10 mg) by mouth daily  - chlorthalidone (HYGROTON) 25 MG tablet; Take 1 tablet (25 mg) by mouth daily  - losartan (COZAAR) 50 MG tablet; Take 1 tablet (50 mg) by mouth daily    Chronic pain of right knee  Stable and followed  - oxyCODONE-acetaminophen (PERCOCET) 7.5-325 MG per tablet; Take 1-2 tablets by mouth 3 times daily as needed for pain Needs follow up visit for any further refill.    Chronic pain syndrome  I will send patient a controlled substance agreement to update.  Otherwise up-to-date on monitoring  - oxyCODONE-acetaminophen (PERCOCET) 7.5-325 MG per tablet; Take 1-2 tablets by mouth 3 times daily as needed for pain Needs follow up visit for any further refill.     BMI:   Estimated body mass index is 35.78 kg/m  as calculated from the following:    Height as of 5/4/20: 1.651 m (5' 5\").    Weight as of 2/21/20: 97.5 kg (215 lb).   Weight management plan: Discussed healthy diet and exercise guidelines        See Patient Instructions    Return in about 3 months (around 12/23/2020) for Diabetes Recheck with Previsit Labs.    Lizett Terry MD  [facility]    Phone call duration:  13 minutes                "

## 2020-09-23 NOTE — LETTER
[facility]  09/23/20    Patient: Xavier Mcguire  YOB: 1970  Medical Record Number: [id number]                                                                  Opioid / Opioid Plus Controlled Substance Agreement    I understand that my care provider has prescribed an opioid (narcotic) controlled substance to help manage my condition(s). I am taking this medicine to help me function or work. I know this is strong medicine, and that it can cause serious side effects. Opioid medicine can be sedating, addicting and may cause a dependency on the drug. They can affect my ability to drive or think, and cause depression. They need to be taken exactly as prescribed. Combining opioids with certain medicines or chemicals (such as cocaine, sedatives and tranquilizers, sleeping pills, meth) can be dangerous or even fatal. Also, if I stop opioids suddenly, I may have severe withdrawal symptoms. Last, I understand that opioids do not work for all types of pain nor for all patients. If not helpful, I may be asked to stop them.      The risks, benefits, and side effects of these medicine(s) were explained to me. I agree that:    1. I will take part in other treatments as advised by my care team. This may be psychiatry or counseling, physical therapy, behavioral therapy, group treatment or a referral to a pain clinic. I will reduce or stop my medicine when my care team tells me to do so.  2. I will take my medicines as prescribed. I will not change the dose or schedule unless my care team tells me to. There will be no refills if I  run out early.   I may be contactedwithout warning and asked to complete a urine drug test or pill count at any time.   3. I will keep all my appointments, and understand this is part of the monitoring of opioids. My care team may require an office visit for EVERY opioid/controlled substance refill. If I miss appointments or don t follow instructions, my care team may  stop my medicine.  4. I will not ask other providers to prescribe controlled substances, and I will not accept controlled substances from other people. If I need another prescribed controlled substance for a new reason, I will tell my care team within 1 business day.  5. I will use one pharmacy to fill all of my controlled substance prescriptions, and it is up to me to make sure that I do not run out of my medicines on weekends or holidays. If my care team is willing to refill my opioid prescription without a visit, I must request refills only during office hours, refills may take up to 3 days to process, and it may take up to 5 to 7 days for my medicine to be mailed and ready at my pharmacy. Prescriptions will not be mailed anywhere except my pharmacy.        [id number]  Rev 12/18         Registration to scan to EHR                             Page 1 of 2               Controlled Substance Agreement Opioid        [facility]  09/23/20  Patient: Xavier Mcguire  YOB: 1970  Medical Record Number: [id number]                                                                  6. I am responsible for my prescriptions. If the medicine/prescription is lost or stolen, it will not be replaced. I also agree not to share controlled substance medicines with anyone.  7. I agree to not use ANY illegal or recreational drugs. This includes marijuana, cocaine, bath salts or other drugs. I agree not to use alcohol unless my care team says I may.          I agree to give urine samples whenever asked. If I don t give a urine sample, the care team may stop my medicine.    8. If I enroll in the [place] Medical Marijuana program, I will tell my care team. I will also sign an agreement to share my medical records with my care team.   9. I will bring in my list of medicines (or my medicine bottles) each time I come to the clinic.   10. I will tell my care team right away if I become pregnant or have a new  medical problem treated outside of my regular clinic.  11. I understand that this medicine can affect my thinking and judgment. It may be unsafe for me to drive, use machinery and do dangerous tasks. I will not do any of these things until I know how the medicine affects me. If my dose changes, I will wait to see how it affects me. I will contact my care team if I have concerns about medicine side effects.    I understand that if I do not follow any of the conditions above, my prescriptions or treatment may be stopped.      I agree that my provider, clinic care team, and pharmacy may work with any city, state or federal law enforcement agency that investigates the misuse, sale, or other diversion of my controlled medicine. I will allow my provider to discuss my care with or share a copy of this agreement with any other treating provider, pharmacy or emergency room where I receive care. I agree to give up (waive) any right of privacy or confidentiality with respect to these consents.     I have read this agreement and have asked questions about anything I did not understand.      ________________________________________________________________________  Patient signature - Date/Time -  Xavier Mcguire                                      ________________________________________________________________________  Witness signature                                                            ________________________________________________________________________  Provider signature - Lizett Terry MD      [id number]  Rev 12/18         Registration to scan to EHR                         Page 2 of 2                   Controlled Substance Agreement Opioid           Page 1 of 2  Opioid Pain Medicines (also known as Narcotics)  What You Need to Know    What are opioids?   Opioids are pain medicines that must be prescribed by a doctor.  They are also known as narcotics.    Examples are:     morphine (MS Contin,  Randi)    oxycodone (Oxycontin)    oxycodone and acetaminophen (Percocet)    hydrocodone and acetaminophen (Vicodin, Norco)     fentanyl patch (Duragesic)     hydromorphone (Dilaudid)     methadone     What do opioids do well?   Opioids are best for short-term pain after a surgery or injury. They also work well for cancer pain. Unlike other pain medicines, they do not cause liver or kidney failure or ulcers. They may help some people with long-lasting (chronic) pain.     What do opioids NOT do well?   Opioids never get rid of pain entirely, and they do not work well for most patients with chronic pain. Opioids do not reduce swelling, one of the causes of pain. They also don t work well for nerve pain.                           For informational purposes only.  Not to replace the advice of your care provider.  Copyright [facility]. All right reserved. [employer] 930244-Jip 02/18.      Page 2 of 2    Risks and side effects   Talk to your doctor before you start or decide to keep taking one of these medicines. Side effects include:    Lowering your breathing rate enough to cause death    Overdose, including death, especially if taking higher than prescribed doses    Long-term opioid use    Worse depression symptoms; less pleasure in things you usually enjoy    Feeling tired or sluggish    Slower thoughts or cloudy thinking    Being more sensitive to pain over time; pain is harder to control    Trouble sleeping or restless sleep    Changes in hormone levels (for example, less testosterone)    Changes in sex drive or ability to have sex    Constipation    Unsafe driving    Itching and sweating    Feeling dizzy    Nausea, vomiting and dry mouth    What else should I know about opioids?  When someone takes opioids for too long or too often, they become dependent. This means that if you stop or reduce the medicine too quickly, you will have withdrawal symptoms.    Dependence is not the same as addiction.  Addiction is when people keep using a substance that harms their body, their mind or their relations with others. If you have a history of drug or alcohol abuse, taking opioids can cause a relapse.    Over time, opioids don t work as well. Most people will need higher and higher doses. The higher the dose, the more serious the side effects. We don t know the long-term effects of opioids.      Prescribed opioids aren't the best way to manage chronic pain    Other ways to manage pain include:      Ibuprofen or acetaminophen.  You should always try this first.      Treat health problems that may be causing pain.      acupuncture or massage, deep breathing, meditation, visual imagery, aromatherapy.      Use heat or ice at the pain site      Physical therapy and exercise      Stop smoking      See a counselor or therapist                                                  People who have used opioids for a long time may have a lower quality of life, worse depression, higher levels of pain and more visits to doctors.    Never share your opioids with others. Be sure to store opioids in a secure place, locked if possible.Young children can easily swallow them and overdose.     You can overdose on opioids.  Signs of overdose include decrease or loss of consciousness, slowed breathing, trouble waking and blue lips.  If someone is worried about overdose, they should call 911.    If you are at risk for overdose, you may get naloxone (Narcan, a medicine that reverses the effects of opioids.  If you overdose, a friend or family member can give you Narcan while waiting for the ambulance.  They need to know the signs of overdose and how to give Narcan.    While you're taking opioids:    Don't use alcohol or street drugs. Taking them together can cause death.    Don't take any of these medicines unless your doctor says its okay.  Taking these with opioids can cause death.    Benzodiazepines (such as lorazepam         or  diazepam)    Muscle relaxers (such as cyclobenzaprine)    sleeping pills    other opioids    Safe disposal of opioids  Find your area drug take-back program, your pharmacy mail-back program, buy a special disposal bag (such as Deterra) from your pharmacy or flush them down the toilet.  Use the guidelines at:  www.fda.gov/drugs/resourcesforyou

## 2020-09-23 NOTE — Clinical Note
Please mail a copy of controlled substance agreement (letters) to patient along with a stamped, return envelope.  Once it has been returned please send it to me for cosignature.  STEW Terry M.D.

## 2020-10-16 ENCOUNTER — TELEPHONE (OUTPATIENT)
Dept: FAMILY MEDICINE | Facility: CLINIC | Age: 50
End: 2020-10-16

## 2020-10-16 DIAGNOSIS — G89.4 CHRONIC PAIN SYNDROME: ICD-10-CM

## 2020-10-16 DIAGNOSIS — G89.29 CHRONIC PAIN OF RIGHT KNEE: ICD-10-CM

## 2020-10-16 DIAGNOSIS — M25.561 CHRONIC PAIN OF RIGHT KNEE: ICD-10-CM

## 2020-10-16 NOTE — TELEPHONE ENCOUNTER
Reason for call:  Medication   If this is a refill request, has the caller requested the refill from the pharmacy already? No  Will the patient be using a [facility]? No  Name of the pharmacy and phone number for the current request: [facility]     Name of the medication requested: OXYCODONE     Other request:     Phone number to reach patient:  Home number on file [phone] (home)    Best Time:  ANY    Can we leave a detailed message on this number?  YES    Travel screening: Negative

## 2020-11-03 ENCOUNTER — TRANSFERRED RECORDS (OUTPATIENT)
Dept: HEALTH INFORMATION MANAGEMENT | Facility: CLINIC | Age: 50
End: 2020-11-03

## 2020-11-03 LAB — RETINOPATHY: NEGATIVE

## 2020-11-21 NOTE — PROGRESS NOTES
SUBJECTIVE:                                                    Xavier Mcguire is a 47 year old male who presents to clinic today for the following health issues:      Diabetes Follow-up      Patient is checking blood sugars: not at all -- when pt was taking them they were still pretty high (185-210s) in AM    Diabetic concerns: None     Symptoms of hypoglycemia (low blood sugar): none     Paresthesias (numbness or burning in feet) or sores: No     Date of last diabetic eye exam: UTD     Hyperlipidemia Follow-Up      Rate your low fat/cholesterol diet?: not monitoring fat    Taking statin?  No    Other lipid medications/supplements?:  none     Hypertension Follow-up      Outpatient blood pressures are not being checked.    Low Salt Diet: no added salt       Chronic Pain Follow-Up       Type / Location of Pain: knee  Analgesia/pain control:       Recent changes:  Worse (walking more)      Overall control: Comfortably manageable  Activity level/function:      Daily activities:  Able to do all daily activities    Work:  Full time, no restrictions  Adverse effects:  No  Adherance    Taking medication as directed?  Yes    Participating in other treatments: None  Risk Factors:    Sleep:  Fair    Mood/anxiety:  controlled    Recent family or social stressors:  none noted    Other aggravating factors: none  PHQ-9 SCORE 3/14/2016 9/20/2016 3/8/2017   Total Score - - -   Total Score 2 3 7     MIKE-7 SCORE 3/14/2016 9/20/2016 3/8/2017   Total Score - - -   Total Score 1 1 5     Encounter-Level CSA - 08/10/2015:                 Controlled Substance Agreement - Scan on 8/12/2015  1:06 PM :  [place] CONTROLLED SUBSTANCE AGREEMENT (below)                 Amount of exercise or physical activity: walking - 10k steps daily (20k for the past 6-7 weeks)    Problems taking medications regularly: not everyday (sometimes missing morning/night)    Medication side effects: none    Diet: regular (no  "restrictions)    SUBJECTIVE:  Here in follow up diabetes, hypertension.  Again, patient upfront about the fact that his compliance is poor.  Another discussion about the importance of taking his meds, and that I really have nothing for him if he won't comply.  Poor diet, no exercise.  Chronic pain - stable     Review of systems otherwise negative.  Past medical, family, and social history reviewed and updated in chart.    OBJECTIVE:  /70 (BP Location: Right arm, Patient Position: Right side, Cuff Size: Adult Regular)  Pulse 80  Temp 98.1  F (36.7  C) (Oral)  Resp 16  Ht 1.676 m (5' 6\")  Wt 107.9 kg (237 lb 14.4 oz)  SpO2 98%  BMI 38.4 kg/m2  Alert, pleasant, upbeat, and in no apparent discomfort.  S1 and S2 normal, no murmurs, clicks, gallops or rubs. Regular rate and rhythm. Chest is clear; no wheezes or rales. No edema or JVD.  Past labs reviewed with the patient.     ASSESSMENT / PLAN:  (E11.65) Type 2 diabetes mellitus with hyperglycemia, without long-term current use of insulin (H)  (primary encounter diagnosis)  Comment: recheck labs - not likely controlled at all   Plan: Hemoglobin A1c, LDL cholesterol direct, Albumin        Random Urine Quantitative            (I10) Essential hypertension with goal blood pressure less than 130/80  Comment: at goal   Plan:     (M25.561,  G89.29) Chronic pain of right knee  Comment:   Plan: oxyCODONE-acetaminophen (PERCOCET) 7.5-325 MG         per tablet, DISCONTINUED:         oxyCODONE-acetaminophen (PERCOCET) 7.5-325 MG         per tablet, DISCONTINUED:         oxyCODONE-acetaminophen (PERCOCET) 7.5-325 MG         per tablet            (G89.4) Chronic pain syndrome  Comment: problem list and CSA up to date   Plan: Comprehen Drug Analysis UR            Follow up 3 months   STEW Terry MD    (Chart documentation completed in part with Dragon voice-recognition software.  Even though reviewed some grammatical, spelling, and word errors may remain.)   " [id number]}     No

## 2020-12-01 ENCOUNTER — VIRTUAL VISIT (OUTPATIENT)
Dept: FAMILY MEDICINE | Facility: CLINIC | Age: 50
End: 2020-12-01
Payer: COMMERCIAL

## 2020-12-01 DIAGNOSIS — N52.9 ERECTILE DYSFUNCTION, UNSPECIFIED ERECTILE DYSFUNCTION TYPE: ICD-10-CM

## 2020-12-01 DIAGNOSIS — Z12.11 COLON CANCER SCREENING: ICD-10-CM

## 2020-12-01 DIAGNOSIS — E11.65 TYPE 2 DIABETES MELLITUS WITH HYPERGLYCEMIA, WITHOUT LONG-TERM CURRENT USE OF INSULIN (H): Primary | ICD-10-CM

## 2020-12-01 DIAGNOSIS — I10 ESSENTIAL HYPERTENSION WITH GOAL BLOOD PRESSURE LESS THAN 130/80: ICD-10-CM

## 2020-12-01 DIAGNOSIS — E66.01 SEVERE OBESITY (BMI 35.0-39.9) WITH COMORBIDITY (H): ICD-10-CM

## 2020-12-01 DIAGNOSIS — G89.4 CHRONIC PAIN SYNDROME: ICD-10-CM

## 2020-12-01 PROCEDURE — 99214 OFFICE O/P EST MOD 30 MIN: CPT | Mod: TEL | Performed by: FAMILY MEDICINE

## 2020-12-01 RX ORDER — TADALAFIL 20 MG/1
20 TABLET ORAL DAILY PRN
Qty: 10 TABLET | Refills: 5 | Status: SHIPPED | OUTPATIENT
Start: 2020-12-01 | End: 2021-08-17

## 2020-12-01 NOTE — PROGRESS NOTES
"Xavier Mcguire is a 50 year old male who is being evaluated via a billable telephone visit.      The patient has been notified of following:     \"This telephone visit will be conducted via a call between you and your physician/provider. We have found that certain health care needs can be provided without the need for a physical exam.  This service lets us provide the care you need with a short phone conversation.  If a prescription is necessary we can send it directly to your pharmacy.  If lab work is needed we can place an order for that and you can then stop by our lab to have the test done at a later time.    Telephone visits are billed at different rates depending on your insurance coverage. During this emergency period, for some insurers they may be billed the same as an in-person visit.  Please reach out to your insurance provider with any questions.    If during the course of the call the physician/provider feels a telephone visit is not appropriate, you will not be charged for this service.\"    Patient has given verbal consent for Telephone visit?  Yes    What phone number would you like to be contacted at? cell    How would you like to obtain your AVS? Lamar Dupree     Xavier Mcguire is a 50 year old male who presents via phone visit today for the following health issues:    HPI     Spoke with patient via phone today in follow-up of diabetes, obesity, hypertension, chronic pain.  Well-known to me and when we last spoke a few months ago we moved his dosing schedule from twice daily to once daily to improve compliance.  He says it is much easier to remember to take but still admits that he forgets sometimes.  This is really one of the biggest drivers in getting his diabetes under control is his overall compliance.  So we discussed other ways to make this work.  No side effects from the medication.  He would like to try changing back to Cialis and see if this is covered as this works much " better than the Viagra.  Our computer does not really show whether his insurance covers this or not but we can at least give it a try.  Up-to-date on urine drug screen but needs to send back his controlled substance agreement and certainly needs to send back his colon cancer screening.    Review of Systems   Constitutional, HEENT, cardiovascular, pulmonary, gi and gu systems are negative, except as otherwise noted.       Objective          Vitals:  No vitals were obtained today due to virtual visit.    healthy, alert and no distress  PSYCH: Alert and oriented times 3; coherent speech, normal   rate and volume, able to articulate logical thoughts, able   to abstract reason, no tangential thoughts, no hallucinations   or delusions  His affect is normal  RESP: No cough, no audible wheezing, able to talk in full sentences  Remainder of exam unable to be completed due to telephone visits    Past labs reviewed with the patient.         Assessment/Plan:    Assessment & Plan     Type 2 diabetes mellitus with hyperglycemia, without long-term current use of insulin (H)  He will be setting up his diabetes recheck soon.  Waiting on the results of recent Covid testing and a family member so as not to infect others.  He himself tested negative.    Severe obesity (BMI 35.0-39.9) with comorbidity (H)  Continue to work on lifestyle improvement    Essential hypertension with goal blood pressure less than 130/80  Has been borderline control we will continue to follow.  Continue same regimen    Erectile dysfunction, unspecified erectile dysfunction type  Discussed mechanism of action of the proposed medication, as well as potential effects, both good and bad.  Patient expressed understanding and agreed with treatment.   - tadalafil (CIALIS) 20 MG tablet; Take 1 tablet (20 mg) by mouth daily as needed    Chronic pain syndrome  Send back controlled substance agreement.  [facility] database monitored routinely    Colon cancer screening  Send  back FIT           See Patient Instructions    Return in about 3 months (around 3/1/2021) for Diabetes follow up, Follow up on Pain, can call for refills, In Office or Video.    Lizett Terry MD  [facility]    Phone call duration:  13 minutes

## 2020-12-16 ENCOUNTER — MYC MEDICAL ADVICE (OUTPATIENT)
Dept: FAMILY MEDICINE | Facility: CLINIC | Age: 50
End: 2020-12-16

## 2020-12-16 ENCOUNTER — TELEPHONE (OUTPATIENT)
Dept: FAMILY MEDICINE | Facility: CLINIC | Age: 50
End: 2020-12-16

## 2020-12-16 DIAGNOSIS — G89.4 CHRONIC PAIN SYNDROME: ICD-10-CM

## 2020-12-16 DIAGNOSIS — M25.561 CHRONIC PAIN OF RIGHT KNEE: ICD-10-CM

## 2020-12-16 DIAGNOSIS — G89.29 CHRONIC PAIN OF RIGHT KNEE: ICD-10-CM

## 2020-12-16 RX ORDER — OXYCODONE AND ACETAMINOPHEN 7.5; 325 MG/1; MG/1
1-2 TABLET ORAL 3 TIMES DAILY PRN
Qty: 150 TABLET | Refills: 0 | Status: SHIPPED | OUTPATIENT
Start: 2020-12-16 | End: 2021-01-13

## 2020-12-16 NOTE — TELEPHONE ENCOUNTER
Reason for Call:  Medication or medication refill: refill   Oxycodone 7.5mg    Do you use a [facility]?  Name of the pharmacy and phone number for the current request:    [facility] [address] [facility]  [phone]    Name of the medication requested: oxycodone    Other request: N/a  Did send a message on [employer], just following up    Can we leave a detailed message on this number? YES    Phone number patient can be reached at: Cell number on file:    Telephone Information:   Mobile [phone]       Best Time: anytime    Call taken on 12/16/2020 at 10:17 AM by Tiara Brantley

## 2020-12-18 ENCOUNTER — DOCUMENTATION ONLY (OUTPATIENT)
Dept: FAMILY MEDICINE | Facility: CLINIC | Age: 50
End: 2020-12-18

## 2020-12-18 DIAGNOSIS — E11.65 TYPE 2 DIABETES MELLITUS WITH HYPERGLYCEMIA, WITHOUT LONG-TERM CURRENT USE OF INSULIN (H): ICD-10-CM

## 2020-12-18 LAB
ALBUMIN SERPL-MCNC: 4.2 G/DL (ref 3.4–5)
ALP SERPL-CCNC: 55 U/L (ref 40–150)
ALT SERPL W P-5'-P-CCNC: 28 U/L (ref 0–70)
ANION GAP SERPL CALCULATED.3IONS-SCNC: 3 MMOL/L (ref 3–14)
AST SERPL W P-5'-P-CCNC: 20 U/L (ref 0–45)
BILIRUB SERPL-MCNC: 0.6 MG/DL (ref 0.2–1.3)
BUN SERPL-MCNC: 15 MG/DL (ref 7–30)
CALCIUM SERPL-MCNC: 9.2 MG/DL (ref 8.5–10.1)
CHLORIDE SERPL-SCNC: 104 MMOL/L (ref 94–109)
CHOLEST SERPL-MCNC: 147 MG/DL
CO2 SERPL-SCNC: 33 MMOL/L (ref 20–32)
CREAT SERPL-MCNC: 1.05 MG/DL (ref 0.66–1.25)
GFR SERPL CREATININE-BSD FRML MDRD: 81 ML/MIN/{1.73_M2}
GLUCOSE SERPL-MCNC: 131 MG/DL (ref 70–99)
HBA1C MFR BLD: 8.3 % (ref 0–5.6)
HDLC SERPL-MCNC: 48 MG/DL
LDLC SERPL CALC-MCNC: 77 MG/DL
NONHDLC SERPL-MCNC: 99 MG/DL
POTASSIUM SERPL-SCNC: 3.8 MMOL/L (ref 3.4–5.3)
PROT SERPL-MCNC: 7.7 G/DL (ref 6.8–8.8)
SODIUM SERPL-SCNC: 140 MMOL/L (ref 133–144)
TRIGL SERPL-MCNC: 110 MG/DL

## 2020-12-18 PROCEDURE — 80061 LIPID PANEL: CPT | Performed by: FAMILY MEDICINE

## 2020-12-18 PROCEDURE — 83036 HEMOGLOBIN GLYCOSYLATED A1C: CPT | Performed by: FAMILY MEDICINE

## 2020-12-18 PROCEDURE — 36415 COLL VENOUS BLD VENIPUNCTURE: CPT | Performed by: FAMILY MEDICINE

## 2020-12-18 PROCEDURE — 80053 COMPREHEN METABOLIC PANEL: CPT | Performed by: FAMILY MEDICINE

## 2020-12-18 NOTE — PROGRESS NOTES
Patient was wondering if he needed a urine test done as well. Patient is leaving a sample.    Please review and place future order.    Thank you,  Pebbles Hoover

## 2020-12-20 NOTE — RESULT ENCOUNTER NOTE
Xavier,  Kidneys, liver, and cholesterol look great.  And your sugar is down a full point - close to that cutoff of less than 8.  So keep at it and we can recheck in a few months.  STEW Terry M.D.

## 2021-01-13 DIAGNOSIS — G89.29 CHRONIC PAIN OF RIGHT KNEE: ICD-10-CM

## 2021-01-13 DIAGNOSIS — G89.4 CHRONIC PAIN SYNDROME: ICD-10-CM

## 2021-01-13 DIAGNOSIS — M25.561 CHRONIC PAIN OF RIGHT KNEE: ICD-10-CM

## 2021-01-13 RX ORDER — OXYCODONE AND ACETAMINOPHEN 7.5; 325 MG/1; MG/1
1-2 TABLET ORAL 3 TIMES DAILY PRN
Qty: 150 TABLET | Refills: 0 | Status: SHIPPED | OUTPATIENT
Start: 2021-01-15 | End: 2021-02-16

## 2021-01-13 NOTE — TELEPHONE ENCOUNTER
Reason for call: Refill Oxycodone to [facility] [address] [facility]    Can we leave a detailed message: Yes     Patient  can be reached at: [phone]    Call taken at 8:50 am  on 1/13/2021    Cynthia Delaney [facility]  2nd Floor  Primary Care

## 2021-01-13 NOTE — TELEPHONE ENCOUNTER
Refilled - please contact patient about sending back CSA (mailed in Sept).  No more refill w/o this.

## 2021-01-13 NOTE — TELEPHONE ENCOUNTER
Routing refill request to provider for review/approval because:  Drug not on the [facility] refill protocol     Odette AMARON, RN

## 2021-01-14 NOTE — TELEPHONE ENCOUNTER
Pt informed, pt states he did not receive form.    CSA re-printed and mailed to pt.      Melani GLASGOW, Patient Care

## 2021-02-16 ENCOUNTER — OFFICE VISIT (OUTPATIENT)
Dept: FAMILY MEDICINE | Facility: CLINIC | Age: 51
End: 2021-02-16
Payer: COMMERCIAL

## 2021-02-16 VITALS
TEMPERATURE: 97.9 F | HEART RATE: 77 BPM | BODY MASS INDEX: 35.99 KG/M2 | WEIGHT: 216 LBS | HEIGHT: 65 IN | OXYGEN SATURATION: 100 % | SYSTOLIC BLOOD PRESSURE: 136 MMHG | DIASTOLIC BLOOD PRESSURE: 76 MMHG

## 2021-02-16 DIAGNOSIS — E11.65 TYPE 2 DIABETES MELLITUS WITH HYPERGLYCEMIA, WITHOUT LONG-TERM CURRENT USE OF INSULIN (H): Primary | ICD-10-CM

## 2021-02-16 DIAGNOSIS — M25.561 CHRONIC PAIN OF RIGHT KNEE: ICD-10-CM

## 2021-02-16 DIAGNOSIS — I10 ESSENTIAL HYPERTENSION WITH GOAL BLOOD PRESSURE LESS THAN 130/80: ICD-10-CM

## 2021-02-16 DIAGNOSIS — G89.4 CHRONIC PAIN SYNDROME: ICD-10-CM

## 2021-02-16 DIAGNOSIS — E66.01 SEVERE OBESITY (BMI 35.0-39.9) WITH COMORBIDITY (H): ICD-10-CM

## 2021-02-16 DIAGNOSIS — Z12.11 COLON CANCER SCREENING: ICD-10-CM

## 2021-02-16 DIAGNOSIS — Z12.5 PROSTATE CANCER SCREENING: ICD-10-CM

## 2021-02-16 DIAGNOSIS — G89.29 CHRONIC PAIN OF RIGHT KNEE: ICD-10-CM

## 2021-02-16 LAB
FOLATE SERPL-MCNC: 21 NG/ML
HBA1C MFR BLD: 8.3 % (ref 0–5.6)
PSA SERPL-ACNC: 2.99 UG/L (ref 0–4)
VIT B12 SERPL-MCNC: 544 PG/ML (ref 193–986)

## 2021-02-16 PROCEDURE — 82607 VITAMIN B-12: CPT | Performed by: FAMILY MEDICINE

## 2021-02-16 PROCEDURE — 82746 ASSAY OF FOLIC ACID SERUM: CPT | Performed by: FAMILY MEDICINE

## 2021-02-16 PROCEDURE — 99214 OFFICE O/P EST MOD 30 MIN: CPT | Performed by: FAMILY MEDICINE

## 2021-02-16 PROCEDURE — 83036 HEMOGLOBIN GLYCOSYLATED A1C: CPT | Performed by: FAMILY MEDICINE

## 2021-02-16 PROCEDURE — G0103 PSA SCREENING: HCPCS | Performed by: FAMILY MEDICINE

## 2021-02-16 PROCEDURE — 36415 COLL VENOUS BLD VENIPUNCTURE: CPT | Performed by: FAMILY MEDICINE

## 2021-02-16 RX ORDER — GLIMEPIRIDE 4 MG/1
8 TABLET ORAL DAILY
Qty: 180 TABLET | Refills: 1 | Status: SHIPPED | OUTPATIENT
Start: 2021-02-16 | End: 2021-08-17

## 2021-02-16 RX ORDER — OXYCODONE AND ACETAMINOPHEN 7.5; 325 MG/1; MG/1
1-2 TABLET ORAL 3 TIMES DAILY PRN
Qty: 150 TABLET | Refills: 0 | Status: SHIPPED | OUTPATIENT
Start: 2021-02-16 | End: 2021-03-19

## 2021-02-16 RX ORDER — CHLORTHALIDONE 25 MG/1
25 TABLET ORAL DAILY
Qty: 90 TABLET | Refills: 1 | Status: SHIPPED | OUTPATIENT
Start: 2021-02-16 | End: 2021-08-17

## 2021-02-16 RX ORDER — METFORMIN HCL 500 MG
2000 TABLET, EXTENDED RELEASE 24 HR ORAL
Qty: 360 TABLET | Refills: 1 | Status: SHIPPED | OUTPATIENT
Start: 2021-02-16 | End: 2021-06-16 | Stop reason: ALTCHOICE

## 2021-02-16 RX ORDER — AMLODIPINE BESYLATE 10 MG/1
10 TABLET ORAL DAILY
Qty: 90 TABLET | Refills: 1 | Status: SHIPPED | OUTPATIENT
Start: 2021-02-16 | End: 2021-08-17

## 2021-02-16 RX ORDER — LOSARTAN POTASSIUM 50 MG/1
50 TABLET ORAL DAILY
Qty: 90 TABLET | Refills: 1 | Status: SHIPPED | OUTPATIENT
Start: 2021-02-16 | End: 2021-08-17

## 2021-02-16 RX ORDER — PIOGLITAZONEHYDROCHLORIDE 45 MG/1
45 TABLET ORAL DAILY
Qty: 90 TABLET | Refills: 1 | Status: SHIPPED | OUTPATIENT
Start: 2021-02-16 | End: 2021-08-17

## 2021-02-16 ASSESSMENT — PAIN SCALES - GENERAL: PAINLEVEL: MODERATE PAIN (5)

## 2021-02-16 ASSESSMENT — MIFFLIN-ST. JEOR: SCORE: 1761.65

## 2021-02-16 NOTE — LETTER
[facility]  02/16/21    Patient: Xavier Mcguire  YOB: 1970  Medical Record Number: [id number]                                                                  Opioid / Opioid Plus Controlled Substance Agreement    I understand that my care provider has prescribed an opioid (narcotic) controlled substance to help manage my condition(s). I am taking this medicine to help me function or work. I know this is strong medicine, and that it can cause serious side effects. Opioid medicine can be sedating, addicting and may cause a dependency on the drug. They can affect my ability to drive or think, and cause depression. They need to be taken exactly as prescribed. Combining opioids with certain medicines or chemicals (such as cocaine, sedatives and tranquilizers, sleeping pills, meth) can be dangerous or even fatal. Also, if I stop opioids suddenly, I may have severe withdrawal symptoms. Last, I understand that opioids do not work for all types of pain nor for all patients. If not helpful, I may be asked to stop them.      The risks, benefits, and side effects of these medicine(s) were explained to me. I agree that:    1. I will take part in other treatments as advised by my care team. This may be psychiatry or counseling, physical therapy, behavioral therapy, group treatment or a referral to a pain clinic. I will reduce or stop my medicine when my care team tells me to do so.  2. I will take my medicines as prescribed. I will not change the dose or schedule unless my care team tells me to. There will be no refills if I  run out early.   I may be contactedwithout warning and asked to complete a urine drug test or pill count at any time.   3. I will keep all my appointments, and understand this is part of the monitoring of opioids. My care team may require an office visit for EVERY opioid/controlled substance refill. If I miss appointments or don t follow instructions, my care team  may stop my medicine.  4. I will not ask other providers to prescribe controlled substances, and I will not accept controlled substances from other people. If I need another prescribed controlled substance for a new reason, I will tell my care team within 1 business day.  5. I will use one pharmacy to fill all of my controlled substance prescriptions, and it is up to me to make sure that I do not run out of my medicines on weekends or holidays. If my care team is willing to refill my opioid prescription without a visit, I must request refills only during office hours, refills may take up to 3 days to process, and it may take up to 5 to 7 days for my medicine to be mailed and ready at my pharmacy. Prescriptions will not be mailed anywhere except my pharmacy.        [id number]  Rev 12/18         Registration to scan to EHR                             Page 1 of 2               Controlled Substance Agreement [facility]  02/16/21  Patient: Xavier Mcguire  YOB: 1970  Medical Record Number: [id number]                                                                  6. I am responsible for my prescriptions. If the medicine/prescription is lost or stolen, it will not be replaced. I also agree not to share controlled substance medicines with anyone.  7. I agree to not use ANY illegal or recreational drugs. This includes marijuana, cocaine, bath salts or other drugs. I agree not to use alcohol unless my care team says I may.          I agree to give urine samples whenever asked. If I don t give a urine sample, the care team may stop my medicine.    8. If I enroll in the [place] Medical Marijuana program, I will tell my care team. I will also sign an agreement to share my medical records with my care team.   9. I will bring in my list of medicines (or my medicine bottles) each time I come to the clinic.   10. I will tell my care team right away if I become pregnant or have a  new medical problem treated outside of my regular clinic.  11. I understand that this medicine can affect my thinking and judgment. It may be unsafe for me to drive, use machinery and do dangerous tasks. I will not do any of these things until I know how the medicine affects me. If my dose changes, I will wait to see how it affects me. I will contact my care team if I have concerns about medicine side effects.    I understand that if I do not follow any of the conditions above, my prescriptions or treatment may be stopped.      I agree that my provider, clinic care team, and pharmacy may work with any city, state or federal law enforcement agency that investigates the misuse, sale, or other diversion of my controlled medicine. I will allow my provider to discuss my care with or share a copy of this agreement with any other treating provider, pharmacy or emergency room where I receive care. I agree to give up (waive) any right of privacy or confidentiality with respect to these consents.     I have read this agreement and have asked questions about anything I did not understand.      ________________________________________________________________________  Patient signature - Date/Time -  Xavier Mcugire                                      ________________________________________________________________________  Witness signature                                                            ________________________________________________________________________  Provider signature - Lizett Terry MD      [id number]  Rev 12/18         Registration to scan to EHR                         Page 2 of 2                   Controlled Substance Agreement Opioid           Page 1 of 2  Opioid Pain Medicines (also known as Narcotics)  What You Need to Know    What are opioids?   Opioids are pain medicines that must be prescribed by a doctor.  They are also known as narcotics.    Examples are:     morphine (MS Contin,  Randi)    oxycodone (Oxycontin)    oxycodone and acetaminophen (Percocet)    hydrocodone and acetaminophen (Vicodin, Norco)     fentanyl patch (Duragesic)     hydromorphone (Dilaudid)     methadone     What do opioids do well?   Opioids are best for short-term pain after a surgery or injury. They also work well for cancer pain. Unlike other pain medicines, they do not cause liver or kidney failure or ulcers. They may help some people with long-lasting (chronic) pain.     What do opioids NOT do well?   Opioids never get rid of pain entirely, and they do not work well for most patients with chronic pain. Opioids do not reduce swelling, one of the causes of pain. They also don t work well for nerve pain.                           For informational purposes only.  Not to replace the advice of your care provider.  Copyright 201 [facility]. All right reserved. [employer] 390507-Oqa 02/18.      Page 2 of 2    Risks and side effects   Talk to your doctor before you start or decide to keep taking one of these medicines. Side effects include:    Lowering your breathing rate enough to cause death    Overdose, including death, especially if taking higher than prescribed doses    Long-term opioid use    Worse depression symptoms; less pleasure in things you usually enjoy    Feeling tired or sluggish    Slower thoughts or cloudy thinking    Being more sensitive to pain over time; pain is harder to control    Trouble sleeping or restless sleep    Changes in hormone levels (for example, less testosterone)    Changes in sex drive or ability to have sex    Constipation    Unsafe driving    Itching and sweating    Feeling dizzy    Nausea, vomiting and dry mouth    What else should I know about opioids?  When someone takes opioids for too long or too often, they become dependent. This means that if you stop or reduce the medicine too quickly, you will have withdrawal symptoms.    Dependence is not the same as addiction.  Addiction is when people keep using a substance that harms their body, their mind or their relations with others. If you have a history of drug or alcohol abuse, taking opioids can cause a relapse.    Over time, opioids don t work as well. Most people will need higher and higher doses. The higher the dose, the more serious the side effects. We don t know the long-term effects of opioids.      Prescribed opioids aren't the best way to manage chronic pain    Other ways to manage pain include:      Ibuprofen or acetaminophen.  You should always try this first.      Treat health problems that may be causing pain.      acupuncture or massage, deep breathing, meditation, visual imagery, aromatherapy.      Use heat or ice at the pain site      Physical therapy and exercise      Stop smoking      See a counselor or therapist                                                  People who have used opioids for a long time may have a lower quality of life, worse depression, higher levels of pain and more visits to doctors.    Never share your opioids with others. Be sure to store opioids in a secure place, locked if possible.Young children can easily swallow them and overdose.     You can overdose on opioids.  Signs of overdose include decrease or loss of consciousness, slowed breathing, trouble waking and blue lips.  If someone is worried about overdose, they should call 911.    If you are at risk for overdose, you may get naloxone (Narcan, a medicine that reverses the effects of opioids.  If you overdose, a friend or family member can give you Narcan while waiting for the ambulance.  They need to know the signs of overdose and how to give Narcan.    While you're taking opioids:    Don't use alcohol or street drugs. Taking them together can cause death.    Don't take any of these medicines unless your doctor says its okay.  Taking these with opioids can cause death.    Benzodiazepines (such as lorazepam         or  diazepam)    Muscle relaxers (such as cyclobenzaprine)    sleeping pills    other opioids    Safe disposal of opioids  Find your area drug take-back program, your pharmacy mail-back program, buy a special disposal bag (such as Deterra) from your pharmacy or flush them down the toilet.  Use the guidelines at:  www.fda.gov/drugs/resourcesforyou

## 2021-02-16 NOTE — PROGRESS NOTES
Assessment & Plan     Type 2 diabetes mellitus with hyperglycemia, without long-term current use of insulin (H)  A1c has improved but not yet at goal of less than 8.  We will recheck and adjust accordingly.  He also mentions that his family has a history of some low folic acid and his mom's neurologist thought this may have contributed to memory loss.  So we discussed this today and patient would like to check on level.  I think this is reasonable but not something we need to follow on a regular basis.  - empagliflozin (JARDIANCE) 10 MG TABS tablet; Take 1 tablet (10 mg) by mouth daily  - glimepiride (AMARYL) 4 MG tablet; Take 2 tablets (8 mg) by mouth daily  - metFORMIN (GLUCOPHAGE-XR) 500 MG 24 hr tablet; Take 4 tablets (2,000 mg) by mouth daily (with dinner)  - pioglitazone (ACTOS) 45 MG tablet; Take 1 tablet (45 mg) by mouth daily  - Hemoglobin A1c  - Folate  - Vitamin B12    Severe obesity (BMI 35.0-39.9) with comorbidity (H)  Working on lifestyle improvement    Essential hypertension with goal blood pressure less than 130/80  Stable on current regimen.  Continue same plan and routine follow-up.   - amLODIPine (NORVASC) 10 MG tablet; Take 1 tablet (10 mg) by mouth daily  - chlorthalidone (HYGROTON) 25 MG tablet; Take 1 tablet (25 mg) by mouth daily  - losartan (COZAAR) 50 MG tablet; Take 1 tablet (50 mg) by mouth daily    Chronic pain of right knee  Stable on current regimen.  Continue same plan and routine follow-up.   - oxyCODONE-acetaminophen (PERCOCET) 7.5-325 MG per tablet; Take 1-2 tablets by mouth 3 times daily as needed for pain    Chronic pain syndrome  Updated controlled substance agreement today.  Up-to-date on  database and urine drug screen  - oxyCODONE-acetaminophen (PERCOCET) 7.5-325 MG per tablet; Take 1-2 tablets by mouth 3 times daily as needed for pain    Prostate cancer screening    - Prostate spec antigen screen    Colon cancer screening    - Fecal colorectal cancer screen (FIT);  Future       See Patient Instructions    Return in about 3 months (around 5/16/2021) for Follow up on Pain, In Office or Video, can call for refills.    Lizett Terry MD  [facility] is a 50 year old who presents for the following health issues  accompanied by his self:    HPI       Diabetes Follow-up       How often are you checking your blood sugar? Not at all    What concerns do you have today about your diabetes? None     Do you have any of these symptoms? (Select all that apply)  No numbness or tingling in feet.  No redness, sores or blisters on feet.  No complaints of excessive thirst.  No reports of blurry vision.  No significant changes to weight.      BP Readings from Last 2 Encounters:   02/16/21 136/76   05/04/20 (!) 169/102     Hemoglobin A1C (%)   Date Value   12/18/2020 8.3 (H)   08/14/2020 9.4 (H)     LDL Cholesterol Calculated (mg/dL)   Date Value   12/18/2020 77   02/19/2020 65               Hypertension Follow-up      Do you check your blood pressure regularly outside of the clinic? No     Are you following a low salt diet? Yes    Are your blood pressures ever more than 140 on the top number (systolic) OR more   than 90 on the bottom number (diastolic), for example 140/90? No    Chronic Pain Follow-Up    Where in your body do you have pain? Knee pain  How has your pain affected your ability to work? Pain does not limit ability to work  Which of these pain treatments have you tried since your last clinic visit?  none  How well are you sleeping? Fair to poor  How has your mood been since your last visit? Better  Have you had a significant life event? No  Other aggravating factors: repetitive activities - not sure  Taking medication as directed? Yes    PHQ-9 SCORE 6/6/2018 12/6/2018 6/12/2019   PHQ-9 Total Score - - -   PHQ-9 Total Score 2 3 3     MIKE-7 SCORE 12/4/2017 6/6/2018 6/12/2019   Total Score - - -   Total Score 0 1 1     No flowsheet  "data found.  Encounter-Level CSA - 08/10/2015:    Controlled Substance Agreement - Scan on 8/12/2015  1:06 PM: [facility] CONTROLLED SUBSTANCE AGREEMENT     Patient-Level CSA:    Controlled Substance Agreement - Opioid - Scan on 12/19/2018  1:21 PM         How many servings of fruits and vegetables do you eat daily?  0-1    On average, how many sweetened beverages do you drink each day (Examples: soda, juice, sweet tea, etc.  Do NOT count diet or artificially sweetened beverages)?   0    How many days per week do you exercise enough to make your heart beat faster? 3 or less    How many minutes a day do you exercise enough to make your heart beat faster? 9 or less  How many days per week do you miss taking your medication? 1-3    What makes it hard for you to take your medications?  remembering to take    Here today in follow-up of diabetes, blood pressure, chronic pain, obesity  Doing well.  Reports no interval health concerns.      Review of Systems   Constitutional, HEENT, cardiovascular, pulmonary, gi and gu systems are negative, except as otherwise noted.      Objective    /76   Pulse 77   Temp 97.9  F (36.6  C) (Tympanic)   Ht 1.651 m (5' 5\")   Wt 98 kg (216 lb)   SpO2 100%   BMI 35.94 kg/m    Body mass index is 35.94 kg/m .  Physical Exam   Alert, pleasant, upbeat, and in no apparent discomfort.  Obese  S1 and S2 normal, no murmurs, clicks, gallops or rubs. Regular rate and rhythm. Chest is clear; no wheezes or rales. No edema or JVD.  Past labs reviewed with the patient.                 "

## 2021-02-18 ENCOUNTER — TELEPHONE (OUTPATIENT)
Dept: FAMILY MEDICINE | Facility: CLINIC | Age: 51
End: 2021-02-18

## 2021-02-18 NOTE — TELEPHONE ENCOUNTER
Reason for Call:  Other Referral Request    Detailed comments: Patient is scheduled with Podiatry on 3/5/21 for type 2 diabetes, hard time managing foot care. Please place referral if necessary. Patient states his insurance did not require one.    Phone Number Patient can be reached at: Home number on file [phone] (home)    Best Time: any    Can we leave a detailed message on this number? YES    Call taken on 2/18/2021 at 10:01 AM by Ciara Pablo

## 2021-02-21 NOTE — RESULT ENCOUNTER NOTE
Xavier,  Overall your lab work looks good other than that sugar as expected.  The folic acid level looks just fine.  STEW Terry M.D.

## 2021-02-22 ENCOUNTER — TELEPHONE (OUTPATIENT)
Dept: FAMILY MEDICINE | Facility: CLINIC | Age: 51
End: 2021-02-22

## 2021-02-22 NOTE — TELEPHONE ENCOUNTER
Xavier is calling.  He was seen on the 16th and sent in an RX for oxycodone but the pharmacy cannot fill because they need a PA.  Please complete ASAP.    Pharmacy said they have called us previously.    [facility] [address] in [place] [phone]      Call Xavier at .

## 2021-02-23 NOTE — TELEPHONE ENCOUNTER
Central Prior Authorization Team   Phone: [phone]      PA Initiation    Medication: oxyCODONE-acetaminophen-Initiated  Insurance Company: [employer] - Phone [phone] Fax [phone]  Pharmacy Filling the Rx: [id number] - [place], [place] - [address] AT [facility] [address]  Filling Pharmacy Phone: [phone]  Filling Pharmacy Fax:    Start Date: 2/23/2021

## 2021-02-23 NOTE — TELEPHONE ENCOUNTER
Prior Authorization Approval    Authorization Effective Date: 2/23/2021  Authorization Expiration Date: 2/23/2022  Medication: oxyCODONE-acetaminophen-APPROVED  Approved Dose/Quantity:   Reference #:     Insurance Company: [employer] - Phone [phone] Fax [phone]  Expected CoPay:       CoPay Card Available:      Foundation Assistance Needed:    Which Pharmacy is filling the prescription (Not needed for infusion/clinic administered): [id number] - [place], [place] - [address] AT [address]  Pharmacy Notified: Yes  Patient Notified: No    Pharmacy will notify patient when medication is ready.

## 2021-03-05 ENCOUNTER — OFFICE VISIT (OUTPATIENT)
Dept: PODIATRY | Facility: CLINIC | Age: 51
End: 2021-03-05
Payer: COMMERCIAL

## 2021-03-05 VITALS
BODY MASS INDEX: 35.94 KG/M2 | WEIGHT: 216 LBS | SYSTOLIC BLOOD PRESSURE: 162 MMHG | DIASTOLIC BLOOD PRESSURE: 76 MMHG | HEART RATE: 67 BPM

## 2021-03-05 DIAGNOSIS — E11.628 DIABETES WITH SKIN COMPLICATION (H): ICD-10-CM

## 2021-03-05 DIAGNOSIS — B35.3 TINEA PEDIS OF BOTH FEET: ICD-10-CM

## 2021-03-05 DIAGNOSIS — B35.1 ONYCHOMYCOSIS: Primary | ICD-10-CM

## 2021-03-05 PROCEDURE — 99204 OFFICE O/P NEW MOD 45 MIN: CPT | Performed by: PODIATRIST

## 2021-03-05 RX ORDER — NYSTATIN AND TRIAMCINOLONE ACETONIDE 100000; 1 [USP'U]/G; MG/G
CREAM TOPICAL 2 TIMES DAILY
Qty: 90 G | Refills: 3 | Status: SHIPPED | OUTPATIENT
Start: 2021-03-05

## 2021-03-05 NOTE — NURSING NOTE
Xavier Mcguire's chief complaint for this visit includes:  Chief Complaint   Patient presents with     New Patient     Type 2 diabetes/ having a hard time managing foot care     PCP: Lizett Terry    Referring Provider:  No referring provider defined for this encounter.    BP (!) 162/76 (BP Location: Left arm, Patient Position: Sitting, Cuff Size: Adult Large)   Pulse 67   Wt 98 kg (216 lb)   BMI 35.94 kg/m    Data Unavailable     Do you need any medication refills at today's visit? No    Heidy Coombs MA
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
## 2021-03-05 NOTE — PATIENT INSTRUCTIONS
Thanks for coming today.  Ortho/Sports Medicine Clinic  [address] [place] [address]    To schedule future appointments in Ortho Clinic, you may call [phone].    To schedule ordered imaging by your provider:   Call Central Imaging [phone]    To schedule an injection ordered by your provider:  Call Central [facility] Injection scheduling line: [phone]  [employer] available online at:  [email or web address]    Please call if any further questions or concerns ([phone]).  Clinic hours 8 am to 5 pm.    Return to clinic (call) if symptoms worsen or fail to improve.
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
## 2021-03-17 ENCOUNTER — TELEPHONE (OUTPATIENT)
Dept: FAMILY MEDICINE | Facility: CLINIC | Age: 51
End: 2021-03-17

## 2021-03-17 NOTE — TELEPHONE ENCOUNTER
Patient Quality Outreach      Summary:    Patient has the following on his problem list/HM:     Hypertension   Last three blood pressure readings:  BP Readings from Last 3 Encounters:   03/05/21 (!) 162/76   02/16/21 136/76   05/04/20 (!) 169/102     Blood pressure: Failed    HTN Guidelines:  ? 139/89     Patient is due/failing the following:   BP check    Type of outreach:    Sent "[employer]" message.    Questions for provider review:    None                                                                                                                                     Natali Prieto

## 2021-03-18 ENCOUNTER — MYC MEDICAL ADVICE (OUTPATIENT)
Dept: FAMILY MEDICINE | Facility: CLINIC | Age: 51
End: 2021-03-18

## 2021-03-18 DIAGNOSIS — M25.561 CHRONIC PAIN OF RIGHT KNEE: ICD-10-CM

## 2021-03-18 DIAGNOSIS — G89.29 CHRONIC PAIN OF RIGHT KNEE: ICD-10-CM

## 2021-03-18 DIAGNOSIS — G89.4 CHRONIC PAIN SYNDROME: ICD-10-CM

## 2021-03-18 NOTE — TELEPHONE ENCOUNTER
Controlled Substance Refill Request for Oxycodone  Problem List Complete:  Yes    Patient is followed by DIMITRI GUZMAN for ongoing prescription of pain medication.  All refills should be approved by this provider, or covering partner.    Medication(s): percocet 7.5.   Maximum quantity per month: 150 = 56.25 MED  Narcan n/a  Clinic visit frequency required: Q 3  months     Controlled substance agreement on file: Yes       Date(s): 2/16/2021    Pain Clinic evaluation in the past: No    DIRE Total Score(s):    8/10/2015   Total Score 20       Last [facility] website verification:  03/18/2021      Cris Lema RN, [facility] Triage

## 2021-03-19 RX ORDER — OXYCODONE AND ACETAMINOPHEN 7.5; 325 MG/1; MG/1
1-2 TABLET ORAL 3 TIMES DAILY PRN
Qty: 150 TABLET | Refills: 0 | Status: SHIPPED | OUTPATIENT
Start: 2021-03-19 | End: 2021-04-15

## 2021-04-05 ENCOUNTER — TELEPHONE (OUTPATIENT)
Dept: PODIATRY | Facility: CLINIC | Age: 51
End: 2021-04-05

## 2021-04-05 NOTE — TELEPHONE ENCOUNTER
4/5 2nd attempt. Provided phone number [phone] to schedule 3 month follow up around 6/5/2021.     Citlaly borja Procedure   Orthopedics, Podiatry, Sports Medicine, ENT/Eye Specialties  [facility]   [phone]

## 2021-04-15 ENCOUNTER — MYC MEDICAL ADVICE (OUTPATIENT)
Dept: FAMILY MEDICINE | Facility: CLINIC | Age: 51
End: 2021-04-15

## 2021-04-15 DIAGNOSIS — G89.29 CHRONIC PAIN OF RIGHT KNEE: ICD-10-CM

## 2021-04-15 DIAGNOSIS — G89.4 CHRONIC PAIN SYNDROME: ICD-10-CM

## 2021-04-15 DIAGNOSIS — M25.561 CHRONIC PAIN OF RIGHT KNEE: ICD-10-CM

## 2021-04-15 RX ORDER — OXYCODONE AND ACETAMINOPHEN 7.5; 325 MG/1; MG/1
1-2 TABLET ORAL 3 TIMES DAILY PRN
Qty: 150 TABLET | Refills: 0 | Status: SHIPPED | OUTPATIENT
Start: 2021-04-16 | End: 2021-05-18

## 2021-04-17 ENCOUNTER — HEALTH MAINTENANCE LETTER (OUTPATIENT)
Age: 51
End: 2021-04-17

## 2021-04-27 ENCOUNTER — TRANSFERRED RECORDS (OUTPATIENT)
Dept: HEALTH INFORMATION MANAGEMENT | Facility: CLINIC | Age: 51
End: 2021-04-27

## 2021-05-05 NOTE — TELEPHONE ENCOUNTER
5/5 3rd attempt. Provided phone number [phone] to schedule 3 month follow up around 6/5/2021.     Citlaly borja Procedure   Orthopedics, Podiatry, Sports Medicine, ENT/Eye Specialties  [facility]   [phone]

## 2021-05-18 ENCOUNTER — OFFICE VISIT (OUTPATIENT)
Dept: FAMILY MEDICINE | Facility: CLINIC | Age: 51
End: 2021-05-18
Payer: COMMERCIAL

## 2021-05-18 VITALS
RESPIRATION RATE: 12 BRPM | DIASTOLIC BLOOD PRESSURE: 80 MMHG | WEIGHT: 211.7 LBS | OXYGEN SATURATION: 98 % | SYSTOLIC BLOOD PRESSURE: 132 MMHG | HEART RATE: 66 BPM | BODY MASS INDEX: 35.23 KG/M2

## 2021-05-18 DIAGNOSIS — E11.65 TYPE 2 DIABETES MELLITUS WITH HYPERGLYCEMIA, WITHOUT LONG-TERM CURRENT USE OF INSULIN (H): Primary | ICD-10-CM

## 2021-05-18 DIAGNOSIS — Z13.6 CARDIOVASCULAR SCREENING; LDL GOAL LESS THAN 70: ICD-10-CM

## 2021-05-18 DIAGNOSIS — M25.561 CHRONIC PAIN OF RIGHT KNEE: ICD-10-CM

## 2021-05-18 DIAGNOSIS — J30.1 SEASONAL ALLERGIC RHINITIS DUE TO POLLEN: ICD-10-CM

## 2021-05-18 DIAGNOSIS — I10 ESSENTIAL HYPERTENSION WITH GOAL BLOOD PRESSURE LESS THAN 130/80: ICD-10-CM

## 2021-05-18 DIAGNOSIS — F11.90 CHRONIC, CONTINUOUS USE OF OPIOIDS: ICD-10-CM

## 2021-05-18 DIAGNOSIS — G89.29 CHRONIC PAIN OF RIGHT KNEE: ICD-10-CM

## 2021-05-18 LAB
ANION GAP SERPL CALCULATED.3IONS-SCNC: 7 MMOL/L (ref 3–14)
BUN SERPL-MCNC: 14 MG/DL (ref 7–30)
CALCIUM SERPL-MCNC: 8.8 MG/DL (ref 8.5–10.1)
CHLORIDE SERPL-SCNC: 104 MMOL/L (ref 94–109)
CO2 SERPL-SCNC: 27 MMOL/L (ref 20–32)
CREAT SERPL-MCNC: 1 MG/DL (ref 0.66–1.25)
GFR SERPL CREATININE-BSD FRML MDRD: 86 ML/MIN/{1.73_M2}
GLUCOSE SERPL-MCNC: 149 MG/DL (ref 70–99)
HBA1C MFR BLD: 8.7 % (ref 0–5.6)
POTASSIUM SERPL-SCNC: 3.1 MMOL/L (ref 3.4–5.3)
SODIUM SERPL-SCNC: 138 MMOL/L (ref 133–144)

## 2021-05-18 PROCEDURE — 80048 BASIC METABOLIC PNL TOTAL CA: CPT | Performed by: FAMILY MEDICINE

## 2021-05-18 PROCEDURE — 36415 COLL VENOUS BLD VENIPUNCTURE: CPT | Performed by: FAMILY MEDICINE

## 2021-05-18 PROCEDURE — 99214 OFFICE O/P EST MOD 30 MIN: CPT | Performed by: FAMILY MEDICINE

## 2021-05-18 PROCEDURE — 83036 HEMOGLOBIN GLYCOSYLATED A1C: CPT | Performed by: FAMILY MEDICINE

## 2021-05-18 RX ORDER — FEXOFENADINE HCL 180 MG/1
180 TABLET ORAL DAILY PRN
Qty: 30 TABLET | Refills: 2 | Status: SHIPPED | OUTPATIENT
Start: 2021-05-18

## 2021-05-18 RX ORDER — OXYCODONE AND ACETAMINOPHEN 7.5; 325 MG/1; MG/1
1-2 TABLET ORAL 3 TIMES DAILY PRN
Qty: 150 TABLET | Refills: 0 | Status: SHIPPED | OUTPATIENT
Start: 2021-05-18 | End: 2021-06-16

## 2021-05-18 NOTE — PROGRESS NOTES
"    Assessment & Plan     Type 2 diabetes mellitus with hyperglycemia, without long-term current use of insulin (H)  A1c has been above goal at approximately 8.3 for some time.  Patient admits that diet and exercise and medication compliance factor into but he has been better recently so we will recheck and adjust as needed.  - Hemoglobin A1c  - Basic metabolic panel    Essential hypertension with goal blood pressure less than 130/80  Stable on current regimen.  Continue same plan and routine follow-up.     CARDIOVASCULAR SCREENING; LDL GOAL LESS THAN 70  Stable and continuing to follow    Chronic, continuous use of opioids  Up-to-date on controlled substance agreement and urine drug screen.  [facility] database reviewed.  - oxyCODONE-acetaminophen (PERCOCET) 7.5-325 MG per tablet; Take 1-2 tablets by mouth 3 times daily as needed for pain    Chronic pain of right knee  As above  - oxyCODONE-acetaminophen (PERCOCET) 7.5-325 MG per tablet; Take 1-2 tablets by mouth 3 times daily as needed for pain    Seasonal allergic rhinitis due to pollen  Discussed mechanism of action of the proposed medication, as well as potential effects, both good and bad.  Patient expressed understanding and agreed with treatment.   - fexofenadine (ALLEGRA) 180 MG tablet; Take 1 tablet (180 mg) by mouth daily as needed for allergies       BMI:   Estimated body mass index is 35.23 kg/m  as calculated from the following:    Height as of 2/16/21: 1.651 m (5' 5\").    Weight as of this encounter: 96 kg (211 lb 11.2 oz).   Weight management plan: Discussed healthy diet and exercise guidelines    See Patient Instructions    Return in about 3 months (around 8/18/2021) for Follow up on Pain, In Office or Video.    Lizett Terry MD  [facility]    Leia Park is a 51 year old who presents for the following health issues     HPI     Diabetes Follow-up      How often are you checking your blood sugar? Not at all    What " concerns do you have today about your diabetes? None     Do you have any of these symptoms? (Select all that apply)  No numbness or tingling in feet.  No redness, sores or blisters on feet.  No complaints of excessive thirst.  No reports of blurry vision.  No significant changes to weight.      BP Readings from Last 2 Encounters:   05/18/21 132/80   03/05/21 (!) 162/76     Hemoglobin A1C (%)   Date Value   02/16/2021 8.3 (H)   12/18/2020 8.3 (H)     LDL Cholesterol Calculated (mg/dL)   Date Value   12/18/2020 77   02/19/2020 65                 R knee f/u   Patient would like RX for allergy meds     Here today for routine follow-up of diabetes, hypertension, chronic knee pain.  Stable on his current regimen.  Hoping numbers are little bit better.  Has been suffering from some allergies this spring and had done well with Allegra in the past.    Review of Systems   Constitutional, HEENT, cardiovascular, pulmonary, gi and gu systems are negative, except as otherwise noted.      Objective    /80   Pulse 66   Resp 12   Wt 96 kg (211 lb 11.2 oz)   SpO2 98%   BMI 35.23 kg/m    Body mass index is 35.23 kg/m .  Physical Exam   Alert, pleasant, upbeat, and in no apparent discomfort.  Ears normal. Throat and pharynx normal. Neck supple. No adenopathy or masses in the neck or supraclavicular regions. Sinuses non tender.  S1 and S2 normal, no murmurs, clicks, gallops or rubs. Regular rate and rhythm. Chest is clear; no wheezes or rales. No edema or JVD.  Past labs reviewed with the patient.

## 2021-05-18 NOTE — RESULT ENCOUNTER NOTE
Well, that sugar was up a little bit.  So please keep up on taking medication as you are supposed to.  STEW Terry M.D.

## 2021-06-16 ENCOUNTER — MYC MEDICAL ADVICE (OUTPATIENT)
Dept: FAMILY MEDICINE | Facility: CLINIC | Age: 51
End: 2021-06-16

## 2021-06-16 DIAGNOSIS — G89.29 CHRONIC PAIN OF RIGHT KNEE: ICD-10-CM

## 2021-06-16 DIAGNOSIS — F11.90 CHRONIC, CONTINUOUS USE OF OPIOIDS: ICD-10-CM

## 2021-06-16 DIAGNOSIS — M25.561 CHRONIC PAIN OF RIGHT KNEE: ICD-10-CM

## 2021-06-16 RX ORDER — OXYCODONE AND ACETAMINOPHEN 7.5; 325 MG/1; MG/1
1-2 TABLET ORAL 3 TIMES DAILY PRN
Qty: 150 TABLET | Refills: 0 | Status: SHIPPED | OUTPATIENT
Start: 2021-06-16 | End: 2021-07-15

## 2021-06-16 NOTE — TELEPHONE ENCOUNTER
Will fill in accordance with pain contract while pcp is out of the office.   Reviewed chart and he is utd on appt.  PDMP Review       Value Time User    State PDMP site checked  Yes 6/16/2021 12:59 PM Deedee Wetzel MD

## 2021-07-15 DIAGNOSIS — G89.29 CHRONIC PAIN OF RIGHT KNEE: ICD-10-CM

## 2021-07-15 DIAGNOSIS — M25.561 CHRONIC PAIN OF RIGHT KNEE: ICD-10-CM

## 2021-07-15 DIAGNOSIS — F11.90 CHRONIC, CONTINUOUS USE OF OPIOIDS: ICD-10-CM

## 2021-07-15 RX ORDER — OXYCODONE AND ACETAMINOPHEN 7.5; 325 MG/1; MG/1
1-2 TABLET ORAL 3 TIMES DAILY PRN
Qty: 150 TABLET | Refills: 0 | Status: SHIPPED | OUTPATIENT
Start: 2021-07-15 | End: 2021-08-17

## 2021-07-15 NOTE — TELEPHONE ENCOUNTER
Reason for Call:  Medication or medication refill:    Do you use a [facility]?  Name of the pharmacy and phone number for the current request:  [address] [phone]    Name of the medication requested: oxyCodone    Other request: none    Can we leave a detailed message on this number? YES    Phone number patient can be reached at: Home number on file [phone] (home)    Best Time: anytime    Call taken on 7/15/2021 at 12:30 PM by Krystal Stuart

## 2021-08-16 NOTE — PROGRESS NOTES
Assessment & Plan     Type 2 diabetes mellitus with hyperglycemia, without long-term current use of insulin (H)  Has not been well controlled the patient notes compliance and remembering to take medications is really his biggest issue.  - empagliflozin (JARDIANCE) 10 MG TABS tablet; Take 1 tablet (10 mg) by mouth daily  - glimepiride (AMARYL) 4 MG tablet; Take 2 tablets (8 mg) by mouth daily  - pioglitazone (ACTOS) 45 MG tablet; Take 1 tablet (45 mg) by mouth daily  - Hemoglobin A1c; Future  - Basic metabolic panel; Future    Severe obesity (BMI 35.0-39.9) with comorbidity (H)  Has actually gained weight so we discussed ways to cut back on carbohydrates, increase exercise, etc.    Hypertension goal BP (blood pressure) < 140/90  Potassium has been running a bit low.  Will recheck.  - amLODIPine (NORVASC) 10 MG tablet; Take 1 tablet (10 mg) by mouth daily  - chlorthalidone (HYGROTON) 25 MG tablet; Take 1 tablet (25 mg) by mouth daily  - losartan (COZAAR) 50 MG tablet; Take 1 tablet (50 mg) by mouth daily    Chronic, continuous use of opioids   database reviewed and controlled substance agreement up-to-date but needs urine drug screen  - Drug Abuse Screen Panel 13, Urine (Pain Care Package) - lab collect; Future  - metFORMIN (GLUCOPHAGE) 500 MG tablet; Take 2 tablets (1,000 mg) by mouth 2 times daily (with meals)  - oxyCODONE-acetaminophen (PERCOCET) 7.5-325 MG per tablet; Take 1-2 tablets by mouth 3 times daily as needed for pain    Chronic pain of right knee    - metFORMIN (GLUCOPHAGE) 500 MG tablet; Take 2 tablets (1,000 mg) by mouth 2 times daily (with meals)  - oxyCODONE-acetaminophen (PERCOCET) 7.5-325 MG per tablet; Take 1-2 tablets by mouth 3 times daily as needed for pain    Erectile dysfunction, unspecified erectile dysfunction type    - tadalafil (CIALIS) 20 MG tablet; Take 1 tablet (20 mg) by mouth daily as needed    Colon cancer screening    - BRENNAN([employer])       See Patient  Instructions    Return in about 3 months (around 11/17/2021) for Follow up on Pain, In Office or Video, can call for refills, Diabetes follow up.    Lizett Terry MD  [facility] [place] Park is a 51 year old who presents for the following health issues     History of Present Illness       Diabetes:   He presents for follow up of diabetes.  He is checking home blood glucose a few times a month. He checks blood glucose before meals.  Blood glucose is sometimes over 200 and never under 70. He is aware of hypoglycemia symptoms including dizziness and weakness. He is concerned about other.  He is having weight gain.         He eats 0-1 servings of fruits and vegetables daily.He consumes 2 sweetened beverage(s) daily.He exercises with enough effort to increase his heart rate 9 or less minutes per day.  He exercises with enough effort to increase his heart rate 3 or less days per week. He is missing 3 dose(s) of medications per week.       Here today in follow-up of diabetes, hypertension, chronic knee pain.  Again admits that he has been lax in taking his medications on a regular basis but unfortunately has gained weight.    Review of Systems   Constitutional, HEENT, cardiovascular, pulmonary, gi and gu systems are negative, except as otherwise noted.      Objective    There were no vitals taken for this visit.  There is no height or weight on file to calculate BMI.  Physical Exam   Alert, pleasant, upbeat, and in no apparent discomfort.  Obese  S1 and S2 normal, no murmurs, clicks, gallops or rubs. Regular rate and rhythm. Chest is clear; no wheezes or rales. No edema or JVD.  Past labs reviewed with the patient.                 Answers for HPI/ROS submitted by the patient on 8/17/2021  If you checked off any problems, how difficult have these problems made it for you to do your work, take care of things at home, or get along with other people?: Not difficult at all  PHQ9 TOTAL  SCORE: 2

## 2021-08-17 ENCOUNTER — OFFICE VISIT (OUTPATIENT)
Dept: FAMILY MEDICINE | Facility: CLINIC | Age: 51
End: 2021-08-17
Payer: COMMERCIAL

## 2021-08-17 VITALS
OXYGEN SATURATION: 98 % | HEART RATE: 60 BPM | DIASTOLIC BLOOD PRESSURE: 78 MMHG | TEMPERATURE: 97.9 F | BODY MASS INDEX: 36.54 KG/M2 | SYSTOLIC BLOOD PRESSURE: 136 MMHG | WEIGHT: 219.6 LBS | RESPIRATION RATE: 20 BRPM

## 2021-08-17 DIAGNOSIS — I10 HYPERTENSION GOAL BP (BLOOD PRESSURE) < 140/90: ICD-10-CM

## 2021-08-17 DIAGNOSIS — E66.01 SEVERE OBESITY (BMI 35.0-39.9) WITH COMORBIDITY (H): ICD-10-CM

## 2021-08-17 DIAGNOSIS — G89.29 CHRONIC PAIN OF RIGHT KNEE: ICD-10-CM

## 2021-08-17 DIAGNOSIS — M25.561 CHRONIC PAIN OF RIGHT KNEE: ICD-10-CM

## 2021-08-17 DIAGNOSIS — N52.9 ERECTILE DYSFUNCTION, UNSPECIFIED ERECTILE DYSFUNCTION TYPE: ICD-10-CM

## 2021-08-17 DIAGNOSIS — Z12.11 COLON CANCER SCREENING: ICD-10-CM

## 2021-08-17 DIAGNOSIS — E11.65 TYPE 2 DIABETES MELLITUS WITH HYPERGLYCEMIA, WITHOUT LONG-TERM CURRENT USE OF INSULIN (H): Primary | ICD-10-CM

## 2021-08-17 DIAGNOSIS — F11.90 CHRONIC, CONTINUOUS USE OF OPIOIDS: ICD-10-CM

## 2021-08-17 LAB
AMPHETAMINES UR QL: NOT DETECTED
ANION GAP SERPL CALCULATED.3IONS-SCNC: 3 MMOL/L (ref 3–14)
BARBITURATES UR QL SCN: NOT DETECTED
BENZODIAZ UR QL SCN: NOT DETECTED
BUN SERPL-MCNC: 20 MG/DL (ref 7–30)
BUPRENORPHINE UR QL: NOT DETECTED
CALCIUM SERPL-MCNC: 9.7 MG/DL (ref 8.5–10.1)
CANNABINOIDS UR QL: NOT DETECTED
CHLORIDE BLD-SCNC: 102 MMOL/L (ref 94–109)
CO2 SERPL-SCNC: 31 MMOL/L (ref 20–32)
COCAINE UR QL SCN: NOT DETECTED
CREAT SERPL-MCNC: 1.01 MG/DL (ref 0.66–1.25)
D-METHAMPHET UR QL: NOT DETECTED
GFR SERPL CREATININE-BSD FRML MDRD: 85 ML/MIN/1.73M2
GLUCOSE BLD-MCNC: 194 MG/DL (ref 70–99)
HBA1C MFR BLD: 9 % (ref 0–5.6)
METHADONE UR QL SCN: NOT DETECTED
OPIATES UR QL SCN: NOT DETECTED
OXYCODONE UR QL SCN: DETECTED
PCP UR QL SCN: NOT DETECTED
POTASSIUM BLD-SCNC: 3.8 MMOL/L (ref 3.4–5.3)
PROPOXYPH UR QL: NOT DETECTED
SODIUM SERPL-SCNC: 136 MMOL/L (ref 133–144)
TRICYCLICS UR QL SCN: NOT DETECTED

## 2021-08-17 PROCEDURE — 99214 OFFICE O/P EST MOD 30 MIN: CPT | Performed by: FAMILY MEDICINE

## 2021-08-17 PROCEDURE — 36415 COLL VENOUS BLD VENIPUNCTURE: CPT | Performed by: FAMILY MEDICINE

## 2021-08-17 PROCEDURE — 80048 BASIC METABOLIC PNL TOTAL CA: CPT | Performed by: FAMILY MEDICINE

## 2021-08-17 PROCEDURE — 83036 HEMOGLOBIN GLYCOSYLATED A1C: CPT | Performed by: FAMILY MEDICINE

## 2021-08-17 PROCEDURE — 80306 DRUG TEST PRSMV INSTRMNT: CPT | Performed by: FAMILY MEDICINE

## 2021-08-17 RX ORDER — CHLORTHALIDONE 25 MG/1
25 TABLET ORAL DAILY
Qty: 90 TABLET | Refills: 1 | Status: SHIPPED | OUTPATIENT
Start: 2021-08-17 | End: 2022-02-14

## 2021-08-17 RX ORDER — PIOGLITAZONEHYDROCHLORIDE 45 MG/1
45 TABLET ORAL DAILY
Qty: 90 TABLET | Refills: 1 | Status: SHIPPED | OUTPATIENT
Start: 2021-08-17 | End: 2022-02-14

## 2021-08-17 RX ORDER — GLIMEPIRIDE 4 MG/1
8 TABLET ORAL DAILY
Qty: 180 TABLET | Refills: 1 | Status: SHIPPED | OUTPATIENT
Start: 2021-08-17 | End: 2022-02-14

## 2021-08-17 RX ORDER — TADALAFIL 20 MG/1
20 TABLET ORAL DAILY PRN
Qty: 10 TABLET | Refills: 5 | Status: SHIPPED | OUTPATIENT
Start: 2021-08-17 | End: 2021-12-27

## 2021-08-17 RX ORDER — AMLODIPINE BESYLATE 10 MG/1
10 TABLET ORAL DAILY
Qty: 90 TABLET | Refills: 1 | Status: SHIPPED | OUTPATIENT
Start: 2021-08-17 | End: 2022-02-14

## 2021-08-17 RX ORDER — OXYCODONE AND ACETAMINOPHEN 7.5; 325 MG/1; MG/1
1-2 TABLET ORAL 3 TIMES DAILY PRN
Qty: 150 TABLET | Refills: 0 | Status: SHIPPED | OUTPATIENT
Start: 2021-08-17 | End: 2021-09-15

## 2021-08-17 RX ORDER — LOSARTAN POTASSIUM 50 MG/1
50 TABLET ORAL DAILY
Qty: 90 TABLET | Refills: 1 | Status: SHIPPED | OUTPATIENT
Start: 2021-08-17 | End: 2022-02-14

## 2021-08-17 ASSESSMENT — PATIENT HEALTH QUESTIONNAIRE - PHQ9
10. IF YOU CHECKED OFF ANY PROBLEMS, HOW DIFFICULT HAVE THESE PROBLEMS MADE IT FOR YOU TO DO YOUR WORK, TAKE CARE OF THINGS AT HOME, OR GET ALONG WITH OTHER PEOPLE: NOT DIFFICULT AT ALL
SUM OF ALL RESPONSES TO PHQ QUESTIONS 1-9: 2
SUM OF ALL RESPONSES TO PHQ QUESTIONS 1-9: 2

## 2021-08-18 ASSESSMENT — PATIENT HEALTH QUESTIONNAIRE - PHQ9: SUM OF ALL RESPONSES TO PHQ QUESTIONS 1-9: 2

## 2021-08-18 NOTE — RESULT ENCOUNTER NOTE
Well, that sugar is even higher.  You have absolutely got to take your medication as prescribed.      STEW Terry M.D.

## 2021-09-15 DIAGNOSIS — F11.90 CHRONIC, CONTINUOUS USE OF OPIOIDS: ICD-10-CM

## 2021-09-15 DIAGNOSIS — M25.561 CHRONIC PAIN OF RIGHT KNEE: ICD-10-CM

## 2021-09-15 DIAGNOSIS — G89.29 CHRONIC PAIN OF RIGHT KNEE: ICD-10-CM

## 2021-09-15 RX ORDER — OXYCODONE AND ACETAMINOPHEN 7.5; 325 MG/1; MG/1
1-2 TABLET ORAL 3 TIMES DAILY PRN
Qty: 150 TABLET | Refills: 0 | Status: SHIPPED | OUTPATIENT
Start: 2021-09-15 | End: 2021-10-13

## 2021-09-26 ENCOUNTER — HEALTH MAINTENANCE LETTER (OUTPATIENT)
Age: 51
End: 2021-09-26

## 2021-10-13 DIAGNOSIS — G89.29 CHRONIC PAIN OF RIGHT KNEE: ICD-10-CM

## 2021-10-13 DIAGNOSIS — F11.90 CHRONIC, CONTINUOUS USE OF OPIOIDS: ICD-10-CM

## 2021-10-13 DIAGNOSIS — M25.561 CHRONIC PAIN OF RIGHT KNEE: ICD-10-CM

## 2021-10-13 RX ORDER — OXYCODONE AND ACETAMINOPHEN 7.5; 325 MG/1; MG/1
1-2 TABLET ORAL 3 TIMES DAILY PRN
Qty: 150 TABLET | Refills: 0 | Status: SHIPPED | OUTPATIENT
Start: 2021-10-13 | End: 2021-11-15

## 2021-10-13 NOTE — TELEPHONE ENCOUNTER
Routing refill request to provider for review/approval because:  Drug not on the [facility] refill protocol   Henny Moe BSN, RN

## 2021-11-15 ENCOUNTER — VIRTUAL VISIT (OUTPATIENT)
Dept: FAMILY MEDICINE | Facility: CLINIC | Age: 51
End: 2021-11-15
Payer: COMMERCIAL

## 2021-11-15 DIAGNOSIS — E11.65 TYPE 2 DIABETES MELLITUS WITH HYPERGLYCEMIA, WITHOUT LONG-TERM CURRENT USE OF INSULIN (H): Primary | ICD-10-CM

## 2021-11-15 DIAGNOSIS — F11.90 CHRONIC, CONTINUOUS USE OF OPIOIDS: ICD-10-CM

## 2021-11-15 DIAGNOSIS — G89.29 CHRONIC PAIN OF RIGHT KNEE: ICD-10-CM

## 2021-11-15 DIAGNOSIS — M25.561 CHRONIC PAIN OF RIGHT KNEE: ICD-10-CM

## 2021-11-15 PROCEDURE — 99213 OFFICE O/P EST LOW 20 MIN: CPT | Mod: TEL | Performed by: FAMILY MEDICINE

## 2021-11-15 RX ORDER — OXYCODONE AND ACETAMINOPHEN 7.5; 325 MG/1; MG/1
1-2 TABLET ORAL 3 TIMES DAILY PRN
Qty: 150 TABLET | Refills: 0 | Status: SHIPPED | OUTPATIENT
Start: 2021-11-15 | End: 2021-12-15

## 2021-11-15 NOTE — PROGRESS NOTES
"Kevin is a 51 year old who is being evaluated via a billable telephone visit.      What phone number would you like to be contacted at? cell  How would you like to obtain your AVS? MyChart    Assessment & Plan     Type 2 diabetes mellitus with hyperglycemia, without long-term current use of insulin (H)  Last A1c was 9.0.  In general he has been hovering somewhere around 8.5 so this represented worsening glycemic control.  Has admitted trouble remembering to take his medication all the time.  Said he has been more compliant with that recently and I suggested that we should set up an A1c sometime in the near future to compare the values.  At the very least I want him back in the office again in 3 months.  - Hemoglobin A1c; Future    Chronic pain of right knee  Last imaging was about 5 years ago so  He is in the office for an take another look at x-ray and decide about orthopedic referral based upon this.  - oxyCODONE-acetaminophen (PERCOCET) 7.5-325 MG per tablet; Take 1-2 tablets by mouth 3 times daily as needed for pain    Chronic, continuous use of opioids  Stable and continuing to follow.  Up-to-date on monitoring.  - oxyCODONE-acetaminophen (PERCOCET) 7.5-325 MG per tablet; Take 1-2 tablets by mouth 3 times daily as needed for pain       BMI:   Estimated body mass index is 36.54 kg/m  as calculated from the following:    Height as of 2/16/21: 1.651 m (5' 5\").    Weight as of 8/17/21: 99.6 kg (219 lb 9.6 oz).   Weight management plan: Discussed healthy diet and exercise guidelines    See Patient Instructions    Return in about 3 months (around 2/15/2022) for Follow up of Chronic Issues, in office.    Lizett Terry MD  [facility]   Kevin is a 51 year old who presents for the following health issues     HPI     Visit with patient today to follow-up on diabetes and chronic knee pain.  Thinks diabetes control is a little bit better and he has been good about taking his " medication.  Still not as active as he like to be in his knee is the biggest factor in this.    Review of Systems   Constitutional, HEENT, cardiovascular, pulmonary, gi and gu systems are negative, except as otherwise noted.      Objective           Vitals:  No vitals were obtained today due to virtual visit.    Physical Exam   healthy, alert and no distress  PSYCH: Alert and oriented times 3; coherent speech, normal   rate and volume, able to articulate logical thoughts, able   to abstract reason, no tangential thoughts, no hallucinations   or delusions  His affect is normal  RESP: No cough, no audible wheezing, able to talk in full sentences  Remainder of exam unable to be completed due to telephone visits    Past labs reviewed with the patient.             Phone call duration: 12 minutes

## 2021-11-16 ENCOUNTER — TELEPHONE (OUTPATIENT)
Dept: FAMILY MEDICINE | Facility: CLINIC | Age: 51
End: 2021-11-16
Payer: COMMERCIAL

## 2021-11-16 DIAGNOSIS — E11.65 TYPE 2 DIABETES MELLITUS WITH HYPERGLYCEMIA, WITHOUT LONG-TERM CURRENT USE OF INSULIN (H): Primary | ICD-10-CM

## 2021-11-16 RX ORDER — GLUCOSAMINE HCL/CHONDROITIN SU 500-400 MG
CAPSULE ORAL
Qty: 100 EACH | Refills: 3 | Status: SHIPPED | OUTPATIENT
Start: 2021-11-16 | End: 2022-01-14

## 2021-11-16 RX ORDER — LANCETS
EACH MISCELLANEOUS
Qty: 100 EACH | Refills: 6 | Status: SHIPPED | OUTPATIENT
Start: 2021-11-16 | End: 2023-12-14

## 2021-11-16 NOTE — TELEPHONE ENCOUNTER
Note to Prescriber: patient is requesting diabetic testing supply prescriptions to be sent in. If appropriate, Please send rx's for meter, strips, and lancets. Thank You.

## 2021-11-25 ENCOUNTER — OFFICE VISIT (OUTPATIENT)
Dept: URGENT CARE | Facility: URGENT CARE | Age: 51
End: 2021-11-25
Payer: COMMERCIAL

## 2021-11-25 VITALS
DIASTOLIC BLOOD PRESSURE: 70 MMHG | TEMPERATURE: 97.8 F | HEART RATE: 93 BPM | OXYGEN SATURATION: 98 % | SYSTOLIC BLOOD PRESSURE: 136 MMHG | WEIGHT: 221.8 LBS | BODY MASS INDEX: 36.91 KG/M2

## 2021-11-25 DIAGNOSIS — E11.65 TYPE 2 DIABETES MELLITUS WITH HYPERGLYCEMIA, WITHOUT LONG-TERM CURRENT USE OF INSULIN (H): ICD-10-CM

## 2021-11-25 DIAGNOSIS — S61.216A LACERATION OF RIGHT LITTLE FINGER WITHOUT FOREIGN BODY WITHOUT DAMAGE TO NAIL, INITIAL ENCOUNTER: Primary | ICD-10-CM

## 2021-11-25 DIAGNOSIS — I10 HYPERTENSION GOAL BP (BLOOD PRESSURE) < 140/90: ICD-10-CM

## 2021-11-25 PROCEDURE — 90715 TDAP VACCINE 7 YRS/> IM: CPT | Performed by: PHYSICIAN ASSISTANT

## 2021-11-25 PROCEDURE — 99214 OFFICE O/P EST MOD 30 MIN: CPT | Mod: 25 | Performed by: PHYSICIAN ASSISTANT

## 2021-11-25 PROCEDURE — 90471 IMMUNIZATION ADMIN: CPT | Performed by: PHYSICIAN ASSISTANT

## 2021-11-25 ASSESSMENT — ENCOUNTER SYMPTOMS
SHORTNESS OF BREATH: 0
COUGH: 0
DYSURIA: 0
CHEST TIGHTNESS: 0
WHEEZING: 0
PALPITATIONS: 0
SORE THROAT: 0
NAUSEA: 0
CHILLS: 0
NEUROLOGICAL NEGATIVE: 1
MYALGIAS: 0
MUSCULOSKELETAL NEGATIVE: 1
ABDOMINAL PAIN: 0
GASTROINTESTINAL NEGATIVE: 1
ALLERGIC/IMMUNOLOGIC NEGATIVE: 1
WOUND: 1
CONSTITUTIONAL NEGATIVE: 1
FEVER: 0
HEMATURIA: 0
CARDIOVASCULAR NEGATIVE: 1
RESPIRATORY NEGATIVE: 1
HEADACHES: 0
VOMITING: 0
DIARRHEA: 0
FREQUENCY: 0

## 2021-11-25 NOTE — PATIENT INSTRUCTIONS
Patient Education     * Laceration (All Closures)  A laceration is a cut through the skin. This will usually require stitches (sutures) or staples if it is deep. Minor cuts may be treated with a tape closure ( Steri-Strips ) or Dermabond skin glue.    Home Care:  PAIN MEDICINE: You may use acetaminophen (Tylenol) 650-1000 mg every 6 hours or ibuprofen (Motrin, Advil) 600 mg every 6-8 hours with food to control pain, if you are able to take these medicines. [NOTE: If you have chronic liver or kidney disease or ever had a stomach ulcer or GI bleeding, talk with your doctor before using these medicines.]  EXTREMITY, FACE or TRUNK WOUNDS:    Keep the wound clean and dry. If a bandage was applied and it becomes wet or dirty, replace it. Otherwise, leave it in place for the first 24 hours.    If stitches or staples were used, clean the wound daily. Protect the wound from sunlight and tanning lamps.    After removing the bandage, wash the area with soap and water. Use a wet cotton swab (Q tip) to loosen and remove any blood or crust that forms.    After cleaning, apply a thin layer of Polysporin or Bacitracin ointment. This will keep the wound clean and make it easier to remove the stitches or staples. Reapply a fresh bandage.    You may remove the bandage to shower as usual after the first 24 hours, but do not soak the area in water (no swimming) until the stitches or staples are removed.    If Steri-Strips were used, keep the area clean and dry. If it becomes wet, blot it dry with a towel. It is okay to take a brief shower, but avoid scrubbing the area.    If Dermabond skin adhesive was used, do not scratch, rub or pick at the adhesive film. Do not place tape directly over the film. Do not apply liquid, ointment or creams to the wound while the film is in place. Do not clean the wound with peroxide and do not apply ointments. Avoid activities that cause heavy sweating until the film has fallen off. Protect the wound  from prolonged exposure to sunlight or tanning lamps. You may shower as usual but do not soak the wound in water (no baths or swimming). The film will fall off by itself in 5-10 days.  SCALP WOUNDS: During the first two days, you may carefully rinse your hair in the shower to remove blood, glass or dirt particles. After two days, you may shower and shampoo your hair normally. Do not soak your scalp in the tub or go swimming until the stitches or staples have been removed.  MOUTH WOUNDS: Eat soft foods to reduce pain. If the cut is inside of your mouth, clean by rinsing after each meal and at bedtime with a mixture of equal parts water and Hydrogen Peroxide (do not swallow!). Or, you can use a cotton swab to directly apply Hydrogen Peroxide onto the cut.  After the wound is done healing, use sunscreen over the area whenever exposed for the next 6 minths to avoid a darker scar.  Follow Up:  Most skin wounds heal within ten days. Mouth and facial wounds heal within five days. However, even with proper treatment, a wound infection may sometimes occur. Therefore, you should check the wound daily for signs of infection listed below.  Stitches should be removed from the face within five days; stitches and staples should be removed from other parts of the body within 7-10 days. Unless you are told to come back to the emergency room, you may have your doctor or urgent care remove the stitches. If dissolving stitches were used in the mouth, these will fall out or dissolve without the need for removal. If tape closures ( Steri-Strips ) were used, remove them yourself if they have not fallen off after 7 days. If Dermabond skin glue was used, the film will fall off by itself in 5-10 days.  Get Prompt Medical Attention  if any of the following occur:    Increasing pain in the wound    Redness, swelling or pus coming from the wound    Fever over 101 F (38.3 C) oral    If stitches or staples come apart or fall out or if  Steri-Strips fall off before seven days    If the wound edges re-open    Bleeding not controlled by direct pressure  For informational purposes only. Not to replace the advice of your health care provider.  Copyright   2018 [employer]. All rights reserved.

## 2021-11-25 NOTE — PROGRESS NOTES
Chief Complaint:     Chief Complaint   Patient presents with     Laceration     right pinky          Assessment:     1. Laceration of right little finger without foreign body without damage to nail, initial encounter    2. Type 2 diabetes mellitus with hyperglycemia, without long-term current use of insulin (H)    3. Hypertension goal BP (blood pressure) < 140/90         Plan:    Imaging of the injured area for foreign body or fracture was not  Indicated.    Patient given tetanus booster in clinic today.    Wound did not need closure.    Wound was cleaned with sterile saline and surgiscrub.    Glue used to seal wound per patient request.     Discussed home wound care. Return to  with increased swelling, pain, redness, pus or fevers.    Patient instructed to monitor blood sugars closely with injury and follow up with PCP for any DM medication changes if needed.    Patient is hypertensive in clinic today.  Second BP reading was 136/70.  Patient instructed to follow up with PCP in the next week for BP recheck if this remains high.      Patient was discharged in stable condition.  Patient verbalized understanding and agreed with this plan.        SUBJECTIVE     HPI: Xavier Mcguire is an 51 year old male that presents to clinic after cutting self on the R pinky finger with a piece of metal yesterday. He denies numbness of the finger. He denies dysfunction of the finger. Patient denies any loss of strength to the finger.  Patient is not up to date on tetanus.     Review of Systems:  Review of Systems   Constitutional: Negative.  Negative for chills and fever.   HENT: Negative.  Negative for sore throat.    Respiratory: Negative.  Negative for cough, chest tightness, shortness of breath and wheezing.    Cardiovascular: Negative.  Negative for chest pain and palpitations.   Gastrointestinal: Negative.  Negative for abdominal pain, diarrhea, nausea and vomiting.   Genitourinary: Negative for dysuria, frequency,  hematuria and urgency.   Musculoskeletal: Negative.  Negative for myalgias.   Skin: Positive for wound. Negative for rash.   Allergic/Immunologic: Negative.  Negative for immunocompromised state.   Neurological: Negative.  Negative for headaches.         Family History   Family History   Problem Relation Age of Onset     Diabetes Maternal Grandmother      Cerebrovascular Disease Maternal Grandmother      Asthma Son      Cerebrovascular Disease Brother         age 18     Diabetes Mother      Cancer Father 63        dad with hip cancer. ? type      C.A.D. No family hx of      Hypertension No family hx of      Breast Cancer No family hx of      Cancer - colorectal No family hx of      Prostate Cancer No family hx of      Alcohol/Drug No family hx of      Allergies No family hx of         Problem history  Patient Active Problem List   Diagnosis     RAYMOND (obstructive sleep apnea)     Erectile dysfunction, unspecified erectile dysfunction type     Chronic, continuous use of opioids     Hypertension goal BP (blood pressure) < 140/90     Chronic pain of right knee     Type 2 diabetes mellitus with hyperglycemia, without long-term current use of insulin (H)     Severe obesity (BMI 35.0-39.9) with comorbidity (H)     Allergic rhinitis, unspecified seasonality, unspecified trigger     CARDIOVASCULAR SCREENING; LDL GOAL LESS THAN 70     Seasonal allergic rhinitis due to pollen        Allergies  Allergies   Allergen Reactions     Lisinopril Other (See Comments)        Social History  Social History     Socioeconomic History     Marital status:      Spouse name: Jaida     Number of children: 4     Years of education: Not on file     Highest education level: Not on file   Occupational History     Occupation: business annalyst     Employer: [employer]   Tobacco Use     Smoking status: Never Smoker     Smokeless tobacco: Never Used   Substance and Sexual Activity     Alcohol use: Yes     Alcohol/week: 3.3 standard  drinks     Types: 4 Standard drinks or equivalent per week     Comment: social - heavy drinker on some weekends     Drug use: No     Sexual activity: Yes     Partners: Female   Other Topics Concern     Parent/sibling w/ CABG, MI or angioplasty before 65F 55M? No   Social History Narrative     Not on file     Social Determinants of Health     Financial Resource Strain: Not on file   Food Insecurity: Not on file   Transportation Needs: Not on file   Physical Activity: Not on file   Stress: Not on file   Social Connections: Not on file   Intimate Partner Violence: Not on file   Housing Stability: Not on file        Current Meds    Current Outpatient Medications:      alcohol swab prep pads, Use to swab area of injection/micheal as directed, Disp: 100 each, Rfl: 3     amLODIPine (NORVASC) 10 MG tablet, Take 1 tablet (10 mg) by mouth daily, Disp: 90 tablet, Rfl: 1     aspirin 81 MG tablet, Take 1 tablet by mouth daily., Disp: 100 tablet, Rfl: 3     blood glucose (NO BRAND SPECIFIED) test strip, Use to test blood sugar 1 times daily or as directed. To accompany: Blood Glucose Monitor Brands: per insurance., Disp: 100 strip, Rfl: 6     blood glucose calibration (NO BRAND SPECIFIED) solution, Use to calibrate blood glucose monitor as needed as directed. To accompany: Blood Glucose Monitor Brands: per insurance., Disp: 1 each, Rfl: 5     chlorthalidone (HYGROTON) 25 MG tablet, Take 1 tablet (25 mg) by mouth daily, Disp: 90 tablet, Rfl: 1     empagliflozin (JARDIANCE) 10 MG TABS tablet, Take 1 tablet (10 mg) by mouth daily, Disp: 90 tablet, Rfl: 1     fexofenadine (ALLEGRA) 180 MG tablet, Take 1 tablet (180 mg) by mouth daily as needed for allergies, Disp: 30 tablet, Rfl: 2     glimepiride (AMARYL) 4 MG tablet, Take 2 tablets (8 mg) by mouth daily, Disp: 180 tablet, Rfl: 1     ipratropium - albuterol 0.5 mg/2.5 mg/3 mL (DUONEB) 0.5-2.5 (3) MG/3ML nebulization, Take 1 vial (3 mLs) by nebulization every 6 hours as needed for  shortness of breath / dyspnea or wheezing, Disp: 90 vial, Rfl: 0     losartan (COZAAR) 50 MG tablet, Take 1 tablet (50 mg) by mouth daily, Disp: 90 tablet, Rfl: 1     metFORMIN (GLUCOPHAGE) 500 MG tablet, Take 2 tablets (1,000 mg) by mouth 2 times daily (with meals), Disp: 360 tablet, Rfl: 1     nystatin-triamcinolone (MYCOLOG II) 625808-6.1 UNIT/GM-% external cream, Apply topically 2 times daily To feet and toenails., Disp: 90 g, Rfl: 3     oxyCODONE-acetaminophen (PERCOCET) 7.5-325 MG per tablet, Take 1-2 tablets by mouth 3 times daily as needed for pain, Disp: 150 tablet, Rfl: 0     pioglitazone (ACTOS) 45 MG tablet, Take 1 tablet (45 mg) by mouth daily, Disp: 90 tablet, Rfl: 1     STATIN NOT PRESCRIBED, INTENTIONAL,, continuous prn for other Statin not prescribed intentionally due to Other low LDL  (This option does not exclude patient from measure), Disp: , Rfl: 0     tadalafil (CIALIS) 20 MG tablet, Take 1 tablet (20 mg) by mouth daily as needed, Disp: 10 tablet, Rfl: 5     thin (NO BRAND SPECIFIED) lancets, Use to test blood sugar 1 times daily or as directed. To accompany: Blood Glucose Monitor Brands: per insurance., Disp: 100 each, Rfl: 6    Current Facility-Administered Medications:      triamcinolone (KENALOG-40) injection 40 mg, 40 mg, INTRA-ARTICULAR, Once, Lizett Terry MD     OBJECTIVE    Vitals reviewed by Johnathon Jaime PA-C  /70   Pulse 93   Temp 97.8  F (36.6  C) (Tympanic)   Wt 100.6 kg (221 lb 12.8 oz)   SpO2 98%   BMI 36.91 kg/m      Physical Exam:    Physical Exam  Vitals and nursing note reviewed.   Constitutional:       General: He is not in acute distress.     Appearance: He is well-developed. He is not ill-appearing, toxic-appearing or diaphoretic.   HENT:      Head: Normocephalic and atraumatic.      Right Ear: Hearing, tympanic membrane, ear canal and external ear normal. Tympanic membrane is not perforated, erythematous, retracted or bulging.      Left Ear:  Hearing, tympanic membrane, ear canal and external ear normal. Tympanic membrane is not perforated, erythematous, retracted or bulging.      Nose: Nose normal. No mucosal edema, congestion or rhinorrhea.      Mouth/Throat:      Pharynx: No oropharyngeal exudate or posterior oropharyngeal erythema.      Tonsils: No tonsillar exudate or tonsillar abscesses. 0 on the right. 0 on the left.   Eyes:      Pupils: Pupils are equal, round, and reactive to light.   Cardiovascular:      Rate and Rhythm: Normal rate and regular rhythm.      Heart sounds: Normal heart sounds, S1 normal and S2 normal. Heart sounds not distant. No murmur heard.  No friction rub. No gallop.    Pulmonary:      Effort: Pulmonary effort is normal. No respiratory distress.      Breath sounds: Normal breath sounds. No decreased breath sounds, wheezing, rhonchi or rales.   Abdominal:      General: Bowel sounds are normal. There is no distension.      Palpations: Abdomen is soft.      Tenderness: There is no abdominal tenderness.   Musculoskeletal:      Right hand: Laceration present.        Hands:       Cervical back: Normal range of motion and neck supple.      Comments: Roughly 2 cm laceration to the R pinky finger.  Wound is currently closed.  Full range of motion of finger.  Finger neurologically intact.  Cap refill less than 2 seconds.   Lymphadenopathy:      Cervical: No cervical adenopathy.   Skin:     General: Skin is warm and dry.      Findings: No rash.   Neurological:      Mental Status: He is alert.      Cranial Nerves: No cranial nerve deficit.   Psychiatric:         Attention and Perception: He is attentive.         Speech: Speech normal.         Behavior: Behavior normal. Behavior is cooperative.         Thought Content: Thought content normal.         Judgment: Judgment normal.         Johnathon Jaime PA-C 10:03 AM

## 2021-12-01 ENCOUNTER — TRANSFERRED RECORDS (OUTPATIENT)
Dept: HEALTH INFORMATION MANAGEMENT | Facility: CLINIC | Age: 51
End: 2021-12-01
Payer: COMMERCIAL

## 2021-12-01 LAB — RETINOPATHY: NORMAL

## 2021-12-15 ENCOUNTER — TELEPHONE (OUTPATIENT)
Dept: FAMILY MEDICINE | Facility: CLINIC | Age: 51
End: 2021-12-15
Payer: COMMERCIAL

## 2021-12-15 DIAGNOSIS — M25.561 CHRONIC PAIN OF RIGHT KNEE: ICD-10-CM

## 2021-12-15 DIAGNOSIS — G89.29 CHRONIC PAIN OF RIGHT KNEE: ICD-10-CM

## 2021-12-15 DIAGNOSIS — F11.90 CHRONIC, CONTINUOUS USE OF OPIOIDS: ICD-10-CM

## 2021-12-15 RX ORDER — OXYCODONE AND ACETAMINOPHEN 7.5; 325 MG/1; MG/1
1-2 TABLET ORAL 3 TIMES DAILY PRN
Qty: 150 TABLET | Refills: 0 | Status: SHIPPED | OUTPATIENT
Start: 2021-12-15 | End: 2022-01-12

## 2021-12-15 NOTE — TELEPHONE ENCOUNTER
Patient is calling for a refill on his oxyCODONE-acetaminophen (PERCOCET) 7.5-325 MG per tablet.Rachel Avina

## 2021-12-17 ENCOUNTER — IMMUNIZATION (OUTPATIENT)
Dept: NURSING | Facility: CLINIC | Age: 51
End: 2021-12-17
Payer: COMMERCIAL

## 2021-12-17 PROCEDURE — 0064A COVID-19,PF,MODERNA (18+ YRS BOOSTER .25ML): CPT

## 2021-12-17 PROCEDURE — 90471 IMMUNIZATION ADMIN: CPT

## 2021-12-17 PROCEDURE — 90686 IIV4 VACC NO PRSV 0.5 ML IM: CPT

## 2021-12-17 PROCEDURE — 91306 COVID-19,PF,MODERNA (18+ YRS BOOSTER .25ML): CPT

## 2021-12-24 DIAGNOSIS — N52.9 ERECTILE DYSFUNCTION, UNSPECIFIED ERECTILE DYSFUNCTION TYPE: ICD-10-CM

## 2021-12-27 RX ORDER — TADALAFIL 20 MG/1
20 TABLET ORAL DAILY PRN
Qty: 10 TABLET | Refills: 5 | Status: SHIPPED | OUTPATIENT
Start: 2021-12-27 | End: 2022-12-30

## 2022-01-07 ENCOUNTER — TELEPHONE (OUTPATIENT)
Dept: FAMILY MEDICINE | Facility: CLINIC | Age: 52
End: 2022-01-07
Payer: COMMERCIAL

## 2022-01-07 DIAGNOSIS — E11.65 TYPE 2 DIABETES MELLITUS WITH HYPERGLYCEMIA, WITHOUT LONG-TERM CURRENT USE OF INSULIN (H): Primary | ICD-10-CM

## 2022-01-07 RX ORDER — PROCHLORPERAZINE 25 MG/1
1 SUPPOSITORY RECTAL
Qty: 1 EACH | Refills: 1 | Status: SHIPPED | OUTPATIENT
Start: 2022-01-07 | End: 2022-07-22

## 2022-01-07 RX ORDER — PROCHLORPERAZINE 25 MG/1
1 SUPPOSITORY RECTAL
Qty: 3 EACH | Refills: 5 | Status: SHIPPED | OUTPATIENT
Start: 2022-01-07 | End: 2022-10-25

## 2022-01-07 RX ORDER — PROCHLORPERAZINE 25 MG/1
1 SUPPOSITORY RECTAL ONCE
Qty: 1 EACH | Refills: 0 | Status: SHIPPED | OUTPATIENT
Start: 2022-01-07 | End: 2022-01-07

## 2022-01-12 ENCOUNTER — TELEPHONE (OUTPATIENT)
Dept: FAMILY MEDICINE | Facility: CLINIC | Age: 52
End: 2022-01-12
Payer: COMMERCIAL

## 2022-01-12 DIAGNOSIS — F11.90 CHRONIC, CONTINUOUS USE OF OPIOIDS: ICD-10-CM

## 2022-01-12 DIAGNOSIS — M25.561 CHRONIC PAIN OF RIGHT KNEE: ICD-10-CM

## 2022-01-12 DIAGNOSIS — E11.65 TYPE 2 DIABETES MELLITUS WITH HYPERGLYCEMIA, WITHOUT LONG-TERM CURRENT USE OF INSULIN (H): Primary | ICD-10-CM

## 2022-01-12 DIAGNOSIS — G89.29 CHRONIC PAIN OF RIGHT KNEE: ICD-10-CM

## 2022-01-12 RX ORDER — OXYCODONE AND ACETAMINOPHEN 7.5; 325 MG/1; MG/1
1-2 TABLET ORAL 3 TIMES DAILY PRN
Qty: 150 TABLET | Refills: 0 | Status: SHIPPED | OUTPATIENT
Start: 2022-01-12 | End: 2022-02-17

## 2022-01-12 NOTE — TELEPHONE ENCOUNTER
This writer attempted to contact Kevin on 01/12/22      Reason for call which diabetic meter is patient needing ordered and Left VM to give a return call to the nurses at [phone].      If patient calls back:   Ask patient which diabetic meter he is wanting ordered.     Qued up oxycodone request in another encounter and sent off to the Provider to approve.       Vee Mullins, SONDRAN RN

## 2022-01-12 NOTE — TELEPHONE ENCOUNTER
Reason for Call:  Other prescription    Detailed comments: Patient called and he says that the Diabetic Meter is Accu Check Guide Meter. Same brand name.    Phone Number Patient can be reached at: Home number on file [phone] (home)    Best Time: Any time     Can we leave a detailed message on this number? YES    Call taken on 1/12/2022 at 4:57 PM by Anita Quispe

## 2022-01-12 NOTE — TELEPHONE ENCOUNTER
Reason for Call:  Medication or medication refill:    Do you use a [facility]?  Name of the pharmacy and phone number for the current request:  [facility] [place] - [phone]    Name of the medication requested: Oxycodone    Other request: Need diabetic meter    Can we leave a detailed message on this number? YES    Phone number patient can be reached at: Cell number on file:    Telephone Information:   Mobile [phone]       Best Time: Any time    Call taken on 1/12/2022 at 2:12 PM by Sam Mcadams

## 2022-01-13 NOTE — TELEPHONE ENCOUNTER
Generic prescription for meter sent to pharmacy, they will dispense brand per insurance or pt preference.  Henny AMARON, RN

## 2022-01-14 RX ORDER — GLUCOSAMINE HCL/CHONDROITIN SU 500-400 MG
CAPSULE ORAL
Qty: 100 EACH | Refills: 3 | Status: SHIPPED | OUTPATIENT
Start: 2022-01-14 | End: 2023-12-14

## 2022-02-14 ENCOUNTER — VIRTUAL VISIT (OUTPATIENT)
Dept: FAMILY MEDICINE | Facility: CLINIC | Age: 52
End: 2022-02-14
Payer: COMMERCIAL

## 2022-02-14 DIAGNOSIS — E66.01 SEVERE OBESITY (BMI 35.0-39.9) WITH COMORBIDITY (H): ICD-10-CM

## 2022-02-14 DIAGNOSIS — E11.65 TYPE 2 DIABETES MELLITUS WITH HYPERGLYCEMIA, WITHOUT LONG-TERM CURRENT USE OF INSULIN (H): ICD-10-CM

## 2022-02-14 DIAGNOSIS — I10 HYPERTENSION GOAL BP (BLOOD PRESSURE) < 140/90: ICD-10-CM

## 2022-02-14 PROCEDURE — 99214 OFFICE O/P EST MOD 30 MIN: CPT | Mod: TEL | Performed by: FAMILY MEDICINE

## 2022-02-14 RX ORDER — AMLODIPINE BESYLATE 10 MG/1
10 TABLET ORAL DAILY
Qty: 90 TABLET | Refills: 1 | Status: SHIPPED | OUTPATIENT
Start: 2022-02-14 | End: 2022-08-22

## 2022-02-14 RX ORDER — PIOGLITAZONEHYDROCHLORIDE 45 MG/1
45 TABLET ORAL DAILY
Qty: 90 TABLET | Refills: 1 | Status: SHIPPED | OUTPATIENT
Start: 2022-02-14 | End: 2022-08-22

## 2022-02-14 RX ORDER — CHLORTHALIDONE 25 MG/1
25 TABLET ORAL DAILY
Qty: 90 TABLET | Refills: 1 | Status: SHIPPED | OUTPATIENT
Start: 2022-02-14 | End: 2022-08-22

## 2022-02-14 RX ORDER — GLIMEPIRIDE 4 MG/1
8 TABLET ORAL DAILY
Qty: 180 TABLET | Refills: 1 | Status: SHIPPED | OUTPATIENT
Start: 2022-02-14 | End: 2022-08-22

## 2022-02-14 RX ORDER — LOSARTAN POTASSIUM 50 MG/1
50 TABLET ORAL DAILY
Qty: 90 TABLET | Refills: 1 | Status: SHIPPED | OUTPATIENT
Start: 2022-02-14 | End: 2022-08-22

## 2022-02-14 NOTE — PROGRESS NOTES
Kevin is a 51 year old who is being evaluated via a billable telephone visit.          What phone number would you like to be contacted at?  See epic  How would you like to obtain your AVS? Lamar    Assessment & Plan     Type 2 diabetes mellitus with hyperglycemia, without long-term current use of insulin (H)  A1C- patient has not done that yet, needs to be scheduled, he is planning to have it done this week at  lab  Will f/u on results and call with recommendations.  Reviewed blood sugar readings from the continuous glucose monitoring-fasting blood sugars in the range of 76 and postprandial between 145-168.  Reviewed extensively on starting a regular exercises, healthy eating and limiting alcohol.  Patient is currently using 4-6 shots of hard liquor on the weekends  Emphasized on avoiding or limiting alcohol  1 Limit alcohol consumption to less than 2 drinks per day (1 drink=5 oz.wine, 12 oz. Beer or 1.5 oz. 80-proof liquor).  Reviewed the negative impact of alcohol on his diabetes, blood pressure and weight.  Continue with glucose monitoring, annual eye exams  Patient verbalised understanding and is agreeable to the plan.  Follow-up with PCP Dr. Terry in 3 months for recheck        - pioglitazone (ACTOS) 45 MG tablet; Take 1 tablet (45 mg) by mouth daily Profile Rx  - glimepiride (AMARYL) 4 MG tablet; Take 2 tablets (8 mg) by mouth daily Profile Rx  -Metformin 500 mg: Take 2 tablets by mouth twice daily, profile prescription    Hypertension goal BP (blood pressure) < 140/90  BP Readings from Last 6 Encounters:   11/25/21 136/70   08/17/21 136/78   05/18/21 132/80   03/05/21 (!) 162/76   02/16/21 136/76   05/04/20 (!) 169/102     Will follow low salt diet, weight loss and regular exercises.  Limit alcohol as mentioned above  - losartan (COZAAR) 50 MG tablet; Take 1 tablet (50 mg) by mouth daily Profile Rx  - chlorthalidone (HYGROTON) 25 MG tablet; Take 1 tablet (25 mg) by mouth daily Profile Rx  - amLODIPine  (NORVASC) 10 MG tablet; Take 1 tablet (10 mg) by mouth daily Profile Rx    Severe obesity (BMI 35.0-39.9) with comorbidity (H)  Wt Readings from Last 5 Encounters:   11/25/21 100.6 kg (221 lb 12.8 oz)   08/17/21 99.6 kg (219 lb 9.6 oz)   05/18/21 96 kg (211 lb 11.2 oz)   03/05/21 98 kg (216 lb)   02/16/21 98 kg (216 lb)     Patient self-reported weight at home from today was 216lbs.  Emphasized on weight loss, portion control, low calorie and low fat diet, healthy eating, regular exercises.        Review of the result(s) of each unique test - A1C  33 minutes spent on the date of the encounter doing chart review, review of test results, interpretation of tests, patient visit and documentation        Chart documentation done in part with Dragon Voice recognition Software. Although reviewed after completion, some word and grammatical error may remain.  Chart forwarded to PCP for FYI     See Patient Instructions    Return in about 3 months (around 5/14/2022), or if symptoms worsen or fail to improve, for Diabetes recheck, medication recheck with Dr. Terry.    Gabe Albert MD  [facility]    Leia rBown is a 51 year old who presents for the following health issues   Patient is here for a telephone visit instead of in person visit due to the current COVID-19 pandemic.  Patient is new to the provider, is here for a telephone visit for diabetes rechecks  Has history of uncontrolled diabetes, currently taking Metformin 1000 mg twice a day Actos 45 mg daily he required 8 mg daily and Jardiance 10 mg daily.      HPI     Diabetes Follow-up    How often are you checking your blood sugar? Continuous glucose monitor  What time of day are you checking your blood sugars (select all that apply)?  Before and after meals  Have you had any blood sugars above 200?  No  Have you had any blood sugars below 70?  No    What symptoms do you notice when your blood sugar is low?  None    What concerns do  you have today about your diabetes? None     Do you have any of these symptoms? (Select all that apply)  No numbness or tingling in feet.  No redness, sores or blisters on feet.  No complaints of excessive thirst.  No reports of blurry vision.  No significant changes to weight.            BP Readings from Last 2 Encounters:   11/25/21 136/70   08/17/21 136/78     Hemoglobin A1C POCT (%)   Date Value   05/18/2021 8.7 (H)   02/16/2021 8.3 (H)     Hemoglobin A1C (%)   Date Value   08/17/2021 9.0 (H)     LDL Cholesterol Calculated (mg/dL)   Date Value   12/18/2020 77   02/19/2020 65         Hypertension Follow-up      Do you check your blood pressure regularly outside of the clinic? No     Are you following a low salt diet? No    Are your blood pressures ever more than 140 on the top number (systolic) OR more   than 90 on the bottom number (diastolic), for example 140/90? N/A              Review of Systems   CONSTITUTIONAL: NEGATIVE for fever, chills, change in weight  RESP: NEGATIVE for significant cough or SOB  CV: NEGATIVE for chest pain, palpitations or peripheral edema  CV: Hx HTN  GI: NEGATIVE for nausea, abdominal pain, heartburn, or change in bowel habits  MUSCULOSKELETAL: Chronic pain of both knees  NEURO: NEGATIVE for weakness, dizziness or paresthesias  ENDOCRINE: NEGATIVE for temperature intolerance, skin/hair changes and history of diabetes  HEME/ALLERGY/IMMUNE: NEGATIVE for bleeding problems  PSYCHIATRIC: NEGATIVE for changes in mood or affect      Objective           Vitals:  No vitals were obtained today due to virtual visit.    Physical Exam   healthy, alert and no distress  PSYCH: Alert and oriented times 3; coherent speech, normal   rate and volume, able to articulate logical thoughts, able   to abstract reason, no tangential thoughts, no hallucinations   or delusions  His affect is normal  RESP: No cough, no audible wheezing, able to talk in full sentences  Remainder of exam unable to be completed  due to telephone visits.          Labs- patient needs to schedule for A1C lab recheck            Phone call duration: 23 minutes

## 2022-02-17 DIAGNOSIS — F11.90 CHRONIC, CONTINUOUS USE OF OPIOIDS: ICD-10-CM

## 2022-02-17 DIAGNOSIS — G89.29 CHRONIC PAIN OF RIGHT KNEE: ICD-10-CM

## 2022-02-17 DIAGNOSIS — M25.561 CHRONIC PAIN OF RIGHT KNEE: ICD-10-CM

## 2022-02-17 RX ORDER — OXYCODONE AND ACETAMINOPHEN 7.5; 325 MG/1; MG/1
1-2 TABLET ORAL 3 TIMES DAILY PRN
Qty: 150 TABLET | Refills: 0 | Status: SHIPPED | OUTPATIENT
Start: 2022-02-17 | End: 2022-03-18

## 2022-02-17 NOTE — TELEPHONE ENCOUNTER
Refilled x 1.  Needs visit (OFV or video visit) before any further refill. Needs CSA, pain follow up

## 2022-02-21 ENCOUNTER — LAB (OUTPATIENT)
Dept: LAB | Facility: CLINIC | Age: 52
End: 2022-02-21
Payer: COMMERCIAL

## 2022-02-21 DIAGNOSIS — Z13.220 SCREENING FOR HYPERLIPIDEMIA: ICD-10-CM

## 2022-02-21 DIAGNOSIS — E11.65 TYPE 2 DIABETES MELLITUS WITH HYPERGLYCEMIA, WITHOUT LONG-TERM CURRENT USE OF INSULIN (H): ICD-10-CM

## 2022-02-21 LAB
CHOLEST SERPL-MCNC: 127 MG/DL
CREAT UR-MCNC: 302 MG/DL
FASTING STATUS PATIENT QL REPORTED: YES
HBA1C MFR BLD: 8.3 % (ref 0–5.6)
HDLC SERPL-MCNC: 40 MG/DL
LDLC SERPL CALC-MCNC: 59 MG/DL
MICROALBUMIN UR-MCNC: 61 MG/L
MICROALBUMIN/CREAT UR: 20.2 MG/G CR (ref 0–17)
NONHDLC SERPL-MCNC: 87 MG/DL
TRIGL SERPL-MCNC: 142 MG/DL

## 2022-02-21 PROCEDURE — 80061 LIPID PANEL: CPT

## 2022-02-21 PROCEDURE — 82043 UR ALBUMIN QUANTITATIVE: CPT

## 2022-02-21 PROCEDURE — 83036 HEMOGLOBIN GLYCOSYLATED A1C: CPT

## 2022-02-21 PROCEDURE — 36415 COLL VENOUS BLD VENIPUNCTURE: CPT

## 2022-02-21 NOTE — RESULT ENCOUNTER NOTE
Kevin Velez,  Well, looks a little better.  Ideally we want to see that A1c less than 8 but it has definitely moved in the right direction.  Keep up the good work and we can recheck in 2-3 months.  STEW Terry M.D.

## 2022-03-16 DIAGNOSIS — G89.29 CHRONIC PAIN OF RIGHT KNEE: ICD-10-CM

## 2022-03-16 DIAGNOSIS — F11.90 CHRONIC, CONTINUOUS USE OF OPIOIDS: ICD-10-CM

## 2022-03-16 DIAGNOSIS — M25.561 CHRONIC PAIN OF RIGHT KNEE: ICD-10-CM

## 2022-03-16 RX ORDER — OXYCODONE AND ACETAMINOPHEN 7.5; 325 MG/1; MG/1
1-2 TABLET ORAL 3 TIMES DAILY PRN
Qty: 150 TABLET | Refills: 0 | OUTPATIENT
Start: 2022-03-16

## 2022-03-16 NOTE — LETTER
Opioid / Opioid Plus Controlled Substance Agreement    This is an agreement between you and your provider about the safe and appropriate use of controlled substance/opioids prescribed by your care team. Controlled substances are medicines that can cause physical and mental dependence (abuse).    There are strict laws about having and using these medicines. We here at [facility] are committing to working with you in your efforts to get better. To support you in this work, we ll help you schedule regular office appointments for medicine refills. If we must cancel or change your appointment for any reason, we ll make sure you have enough medicine to last until your next appointment.     As a Provider, I will:    Listen carefully to your concerns and treat you with respect.     Recommend a treatment plan that I believe is in your best interest. This plan may involve therapies other than opioid pain medication.     Talk with you often about the possible benefits, and the risk of harm of any medicine that we prescribe for you.     Provide a plan on how to taper (discontinue or go off) using this medicine if the decision is made to stop its use.    As a Patient, I understand that opioid(s):     Are a controlled substance prescribed by my care team to help me function or work and manage my condition(s).     Are strong medicines and can cause serious side effects such as:    Drowsiness, which can seriously affect my driving ability    A lower breathing rate, enough to cause death    Harm to my thinking ability     Depression     Abuse of and addiction to this medicine    Need to be taken exactly as prescribed. Combining opioids with certain medicines or chemicals (such as illegal drugs, sedatives, sleeping pills, and benzodiazepines) can be dangerous or even fatal. If I stop opioids suddenly, I may have severe withdrawal symptoms.    Do not work for all types of pain nor for all patients. If they re not helpful, I may  be asked to stop them.        The risks, benefits and side effects of these medicine(s) were explained to me. I agree that:  1. I will take part in other treatments as advised by my care team. This may be psychiatry or counseling, physical therapy, behavioral therapy, group treatment or a referral to a specialist.     2. I will keep all my appointments. I understand that this is part of the monitoring of opioids. My care team may require an office visit for EVERY opioid/controlled substance refill. If I miss appointments or don t follow instructions, my care team may stop my medicine.    3. I will take my medicines as prescribed. I will not change the dose or schedule unless my care team tells me to. There will be no refills if I run out early.     4. I may be asked to come to the clinic and complete a urine drug test or complete a pill count at any time. If I don t give a urine sample or participate in a pill count, the care team may stop my medicine.    5. I will only receive prescriptions from this clinic for chronic pain. If I am treated by another provider for acute pain issues, I will tell them that I am taking opioid pain medication for chronic pain and that I have a treatment agreement with this provider. I will inform my [facility] care team within one business day if I am given a prescription for any pain medication by another healthcare provider. My [facility] care team can contact other providers and pharmacists about my use of any medicines.    6. It is up to me to make sure that I don t run out of my medicines on weekends or holidays. If my care team is willing to refill my opioid prescription without a visit, I must request refills only during office hours. Refills may take up to 3 business days to process. I will use one pharmacy to fill all my opioid and other controlled substance prescriptions. I will notify the clinic about any changes to my insurance or medication  availability.    7. I am responsible for my prescriptions. If the medicine/prescription is lost, stolen or destroyed, it will not be replaced. I also agree not to share controlled substance medicines with anyone.    8. I am aware I should not use any illegal or recreational drugs. I agree not to drink alcohol unless my care team says I can.       9. If I enroll in the [place] Medical Cannabis program, I will tell my care team prior to my next refill.     10. I will tell my care team right away if I become pregnant, have a new medical problem treated outside of my regular clinic, or have a change in my medications.    11. I understand that this medicine can affect my thinking, judgment and reaction time. Alcohol and drugs affect the brain and body, which can affect the safety of my driving. Being under the influence of alcohol or drugs can affect my decision-making, behaviors, personal safety, and the safety of others. Driving while impaired (DWI) can occur if a person is driving, operating, or in physical control of a car, motorcycle, boat, snowmobile, ATV, motorbike, off-road vehicle, or any other motor vehicle ([place] Statute 169A.20). I understand the risk if I choose to drive or operate any vehicle or machinery.    I understand that if I do not follow any of the conditions above, my prescriptions or treatment may be stopped or changed.          Opioids  What You Need to Know    What are opioids?   Opioids are pain medicines that must be prescribed by a doctor. They are also known as narcotics.     Examples are:   1. morphine (MS Contin, Randi)  2. oxycodone (Oxycontin)  3. oxycodone and acetaminophen (Percocet)  4. hydrocodone and acetaminophen (Vicodin, Norco)   5. fentanyl patch (Duragesic)   6. hydromorphone (Dilaudid)   7. methadone  8. codeine (Tylenol #3)     What do opioids do well?   Opioids are best for severe short-term pain such as after a surgery or injury. They may work well for cancer pain. They may  help some people with long-lasting (chronic) pain.     What do opioids NOT do well?   Opioids never get rid of pain entirely, and they don t work well for most patients with chronic pain. Opioids don t reduce swelling, one of the causes of pain.                                    Other ways to manage chronic pain and improve function include:       Treat the health problem that may be causing pain    Anti-inflammation medicines, which reduce swelling and tenderness, such as ibuprofen (Advil, Motrin) or naproxen (Aleve)    Acetaminophen (Tylenol)    Antidepressants and anti-seizure medicines, especially for nerve pain    Topical treatments such as patches or creams    Injections or nerve blocks    Chiropractic or osteopathic treatment    Acupuncture, massage, deep breathing, meditation, visual imagery, aromatherapy    Use heat or ice at the pain site    Physical therapy     Exercise    Stop smoking    Take part in therapy       Risks and side effects     Talk to your doctor before you start or decide to keep taking opioids. Possible side effects include:      Lowering your breathing rate enough to cause death    Overdose, including death, especially if taking higher than prescribed doses    Worse depression symptoms; less pleasure in things you usually enjoy    Feeling tired or sluggish    Slower thoughts or cloudy thinking    Being more sensitive to pain over time; pain is harder to control    Trouble sleeping or restless sleep    Changes in hormone levels (for example, less testosterone)    Changes in sex drive or ability to have sex    Constipation    Unsafe driving    Itching and sweating    Dizziness    Nausea, throwing up and dry mouth    What else should I know about opioids?    Opioids may lead to dependence, tolerance, or addiction.      Dependence means that if you stop or reduce the medicine too quickly, you will have withdrawal symptoms. These include loose poop (diarrhea), jitters, flu-like symptoms,  nervousness and tremors. Dependence is not the same as addiction.                       Tolerance means needing higher doses over time to get the same effect. This may increase the chance of serious side effects.      Addiction is when people improperly use a substance that harms their body, their mind or their relations with others. Use of opiates can cause a relapse of addiction if you have a history of drug or alcohol abuse.      People who have used opioids for a long time may have a lower quality of life, worse depression, higher levels of pain and more visits to doctors.    You can overdose on opioids. Take these steps to lower your risk of overdose:    1. Recognize the signs:  Signs of overdose include decrease or loss of consciousness (blackout), slowed breathing, trouble waking up and blue lips. If someone is worried about overdose, they should call 911.    2. Talk to your doctor about Narcan (naloxone).   If you are at risk for overdose, you may be given a prescription for Narcan. This medicine very quickly reverses the effects of opioids.   If you overdose, a friend or family member can give you Narcan while waiting for the ambulance. They need to know the signs of overdose and how to give Narcan.     3. Don't use alcohol or street drugs.   Taking them with opioids can cause death.    4. Do not take any of these medicines unless your doctor says it s OK. Taking these with opioids can cause death:    Benzodiazepines, such as lorazepam (Ativan), alprazolam (Xanax) or diazepam (Valium)    Muscle relaxers, such as cyclobenzaprine (Flexeril)    Sleeping pills like zolpidem (Ambien)     Other opioids      How to keep you and other people safe while taking opioids:    1. Never share your opioids with others.  Opioid medicines are regulated by the Drug Enforcement Agency ([place]). Selling or sharing medications is a criminal act.    2. Be sure to store opioids in a secure place, locked up if possible. Young children  can easily swallow them and overdose.    3. When you are traveling with your medicines, keep them in the original bottles. If you use a pill box, be sure you also carry a copy of your medicine list from your clinic or pharmacy.    4. Safe disposal of opioids    Most pharmacies have places to get rid of medicine, called disposal kiosks. Medicine disposal options are also available in every [facility]. Search your county and  medication disposal  to find more options. You can find more details at:  [email or web address]-[email or web address]-medications     I agree that my provider, clinic care team, and pharmacy may work with any city, state or federal law enforcement agency that investigates the misuse, sale, or other diversion of my controlled medicine. I will allow my provider to discuss my care with, or share a copy of, this agreement with any other treating provider, pharmacy or emergency room where I receive care.    I have read this agreement and have asked questions about anything I did not understand.    _______________________________________________________  Patient Signature - Xavier Mcguire _____________________                   Date     _______________________________________________________  Provider Signature - Lizett Terry MD   _____________________                   Date     _______________________________________________________  Witness Signature (required if provider not present while patient signing)   _____________________                   Date

## 2022-03-16 NOTE — TELEPHONE ENCOUNTER
Refill Request:    Oxycodone     [id number] - [place], [place] - [address] AT [facility] OF [address]

## 2022-03-16 NOTE — TELEPHONE ENCOUNTER
Attempted to contact patient to set up a virtual appointment. No answer, left message to return call to clinic [phone].     Pinky Lan RN [facility]     normal...

## 2022-03-16 NOTE — TELEPHONE ENCOUNTER
Refill denied - needs CSA and pain visit.  We can mail CSA and do a virtual pain follow up.  So if he schedules I will refill.  I just don't want to wait until [place]

## 2022-03-18 ENCOUNTER — MYC MEDICAL ADVICE (OUTPATIENT)
Dept: FAMILY MEDICINE | Facility: CLINIC | Age: 52
End: 2022-03-18
Payer: COMMERCIAL

## 2022-03-18 DIAGNOSIS — F11.90 CHRONIC, CONTINUOUS USE OF OPIOIDS: ICD-10-CM

## 2022-03-18 DIAGNOSIS — G89.29 CHRONIC PAIN OF RIGHT KNEE: ICD-10-CM

## 2022-03-18 DIAGNOSIS — M25.561 CHRONIC PAIN OF RIGHT KNEE: ICD-10-CM

## 2022-03-18 RX ORDER — OXYCODONE AND ACETAMINOPHEN 7.5; 325 MG/1; MG/1
1-2 TABLET ORAL 3 TIMES DAILY PRN
Qty: 150 TABLET | Refills: 0 | Status: SHIPPED | OUTPATIENT
Start: 2022-03-18 | End: 2022-04-26

## 2022-03-18 NOTE — TELEPHONE ENCOUNTER
Refill sent.  Please mail a copy of controlled substance agreement (letters) to patient along with a stamped, return envelope.  Once it has been returned please send it to me for cosignature.  STEW Terry M.D.

## 2022-03-18 NOTE — TELEPHONE ENCOUNTER
Was able to get patient to schedule virtual pain follow-up on 4/26/22 at 5:00pm.  Pt will need CSA mailed.     Pt requesting refill of oxycodone until appointment.     Ariella Nolasco RN, BSN  [facility]

## 2022-04-26 ENCOUNTER — VIRTUAL VISIT (OUTPATIENT)
Dept: FAMILY MEDICINE | Facility: CLINIC | Age: 52
End: 2022-04-26
Payer: COMMERCIAL

## 2022-04-26 DIAGNOSIS — F11.90 CHRONIC, CONTINUOUS USE OF OPIOIDS: ICD-10-CM

## 2022-04-26 DIAGNOSIS — E66.01 SEVERE OBESITY (BMI 35.0-39.9) WITH COMORBIDITY (H): ICD-10-CM

## 2022-04-26 DIAGNOSIS — E11.65 TYPE 2 DIABETES MELLITUS WITH HYPERGLYCEMIA, WITHOUT LONG-TERM CURRENT USE OF INSULIN (H): Primary | ICD-10-CM

## 2022-04-26 DIAGNOSIS — G89.29 CHRONIC PAIN OF RIGHT KNEE: ICD-10-CM

## 2022-04-26 DIAGNOSIS — Z13.6 CARDIOVASCULAR SCREENING; LDL GOAL LESS THAN 70: ICD-10-CM

## 2022-04-26 DIAGNOSIS — Z12.11 SCREEN FOR COLON CANCER: ICD-10-CM

## 2022-04-26 DIAGNOSIS — I10 HYPERTENSION GOAL BP (BLOOD PRESSURE) < 140/90: ICD-10-CM

## 2022-04-26 DIAGNOSIS — M25.561 CHRONIC PAIN OF RIGHT KNEE: ICD-10-CM

## 2022-04-26 PROCEDURE — 99214 OFFICE O/P EST MOD 30 MIN: CPT | Mod: GT | Performed by: FAMILY MEDICINE

## 2022-04-26 RX ORDER — OXYCODONE AND ACETAMINOPHEN 7.5; 325 MG/1; MG/1
1-2 TABLET ORAL 3 TIMES DAILY PRN
Qty: 150 TABLET | Refills: 0 | Status: SHIPPED | OUTPATIENT
Start: 2022-04-26 | End: 2022-05-24

## 2022-04-26 NOTE — Clinical Note
Please abstract the following data from this visit with this patient into the appropriate field in Epic:  Tests that can be patient reported without a hard copy:  Eye exam with ophthalmology on this date: December 2021  Other Tests found in the patient's chart through Chart Review/Care Everywhere:  Eye exam with ophthalmology on this date: December 2021 [facility]  Note to Abstraction: If this section is blank, no results were found via Chart Review/Care Everywhere.

## 2022-04-26 NOTE — PROGRESS NOTES
Kevin is a 52 year old who is being evaluated via a billable video visit.      How would you like to obtain your AVS? MyChart  If the video visit is dropped, the invitation should be resent by: Text to cell phone: [phone]   Will anyone else be joining your video visit? No      Video Start Time: 1704    Assessment & Plan     Type 2 diabetes mellitus with hyperglycemia, without long-term current use of insulin (H)  A1c had improved to 8.3  - Lipid panel reflex to direct LDL Fasting; Future  - Comprehensive metabolic panel; Future  - Hemoglobin A1c; Future  - Albumin Random Urine Quantitative with Creat Ratio; Future    Hypertension goal BP (blood pressure) < 140/90  Stable on current regimen.  Continue same plan and routine follow-up.     Severe obesity (BMI 35.0-39.9) with comorbidity (H)  Weight is down a little bit patient hoping diabetes numbers have come down as well    CARDIOVASCULAR SCREENING; LDL GOAL LESS THAN 70  Continue to follow    Chronic pain of right knee   database reviewed.  Patient due to sign his controlled substance agreement we will make sure he is up-to-date on other routine monitoring.  - oxyCODONE-acetaminophen (PERCOCET) 7.5-325 MG per tablet; Take 1-2 tablets by mouth 3 times daily as needed for pain    Chronic, continuous use of opioids  As above  - Drug Abuse Screen Panel 13, Urine (Pain Care Package) - lab collect; Future  - oxyCODONE-acetaminophen (PERCOCET) 7.5-325 MG per tablet; Take 1-2 tablets by mouth 3 times daily as needed for pain    Screen for colon cancer    - BRENNAN([employer])       See Patient Instructions    Return in about 3 months (around 7/26/2022) for Follow up on Pain, In Office or Video, can call for refills.    Lizett Terry MD  [facility] and Home Maintenance: Pt completed his eye exam December 2021. Info will be sent to abstracting.     Karen Wallis CMA        Subjective   Kevin is a 52 year old who presents for the  following health issues  accompanied by his self.    HPI     Pt did receive the Narcotic agreement in the mail and did sign it and has it with him at home.     Pt needing to discuss pain medications for knee pain.  Taking it as prescribed without side effects and it is working well.      Visit with patient today to follow-up on chronic knee pain and diabetes.  Hoping numbers are a bit better.    Review of Systems   Constitutional, HEENT, cardiovascular, pulmonary, gi and gu systems are negative, except as otherwise noted.      Objective           Vitals:  No vitals were obtained today due to virtual visit.    Physical Exam   GENERAL: Healthy, alert and no distress  EYES: Eyes grossly normal to inspection.  No discharge or erythema, or obvious scleral/conjunctival abnormalities.  RESP: No audible wheeze, cough, or visible cyanosis.  No visible retractions or increased work of breathing.    SKIN: Visible skin clear. No significant rash, abnormal pigmentation or lesions.  NEURO: Cranial nerves grossly intact.  Mentation and speech appropriate for age.  PSYCH: Mentation appears normal, affect normal/bright, judgement and insight intact, normal speech and appearance well-groomed.    Past labs reviewed with the patient.             Video-Visit Details    Type of service:  Video Visit    Video End Time:1716    Originating Location (pt. Location): Home    Distant Location (provider location):  [facility]     Platform used for Video Visit: Ruben

## 2022-04-27 ENCOUNTER — TELEPHONE (OUTPATIENT)
Dept: FAMILY MEDICINE | Facility: CLINIC | Age: 52
End: 2022-04-27
Payer: COMMERCIAL

## 2022-04-28 ENCOUNTER — LAB (OUTPATIENT)
Dept: LAB | Facility: CLINIC | Age: 52
End: 2022-04-28
Payer: COMMERCIAL

## 2022-04-28 DIAGNOSIS — F11.90 CHRONIC, CONTINUOUS USE OF OPIOIDS: ICD-10-CM

## 2022-04-28 DIAGNOSIS — E11.65 TYPE 2 DIABETES MELLITUS WITH HYPERGLYCEMIA, WITHOUT LONG-TERM CURRENT USE OF INSULIN (H): ICD-10-CM

## 2022-04-28 LAB
ALBUMIN SERPL-MCNC: 4 G/DL (ref 3.4–5)
ALP SERPL-CCNC: 57 U/L (ref 40–150)
ALT SERPL W P-5'-P-CCNC: 28 U/L (ref 0–70)
AMPHETAMINES UR QL: NOT DETECTED
ANION GAP SERPL CALCULATED.3IONS-SCNC: 6 MMOL/L (ref 3–14)
AST SERPL W P-5'-P-CCNC: 16 U/L (ref 0–45)
BARBITURATES UR QL SCN: NOT DETECTED
BENZODIAZ UR QL SCN: NOT DETECTED
BILIRUB SERPL-MCNC: 0.6 MG/DL (ref 0.2–1.3)
BUN SERPL-MCNC: 18 MG/DL (ref 7–30)
BUPRENORPHINE UR QL: NOT DETECTED
CALCIUM SERPL-MCNC: 9.7 MG/DL (ref 8.5–10.1)
CANNABINOIDS UR QL: NOT DETECTED
CHLORIDE BLD-SCNC: 103 MMOL/L (ref 94–109)
CHOLEST SERPL-MCNC: 130 MG/DL
CO2 SERPL-SCNC: 28 MMOL/L (ref 20–32)
COCAINE UR QL SCN: NOT DETECTED
CREAT SERPL-MCNC: 1.04 MG/DL (ref 0.66–1.25)
CREAT UR-MCNC: 151 MG/DL
D-METHAMPHET UR QL: NOT DETECTED
FASTING STATUS PATIENT QL REPORTED: YES
GFR SERPL CREATININE-BSD FRML MDRD: 86 ML/MIN/1.73M2
GLUCOSE BLD-MCNC: 102 MG/DL (ref 70–99)
HBA1C MFR BLD: 7.6 % (ref 0–5.6)
HDLC SERPL-MCNC: 46 MG/DL
LDLC SERPL CALC-MCNC: 64 MG/DL
METHADONE UR QL SCN: NOT DETECTED
MICROALBUMIN UR-MCNC: 14 MG/L
MICROALBUMIN/CREAT UR: 9.27 MG/G CR (ref 0–17)
NONHDLC SERPL-MCNC: 84 MG/DL
OPIATES UR QL SCN: NOT DETECTED
OXYCODONE UR QL SCN: DETECTED
PCP UR QL SCN: NOT DETECTED
POTASSIUM BLD-SCNC: 3.9 MMOL/L (ref 3.4–5.3)
PROPOXYPH UR QL: NOT DETECTED
PROT SERPL-MCNC: 8 G/DL (ref 6.8–8.8)
SODIUM SERPL-SCNC: 137 MMOL/L (ref 133–144)
TRICYCLICS UR QL SCN: NOT DETECTED
TRIGL SERPL-MCNC: 98 MG/DL

## 2022-04-28 PROCEDURE — 80053 COMPREHEN METABOLIC PANEL: CPT

## 2022-04-28 PROCEDURE — 36415 COLL VENOUS BLD VENIPUNCTURE: CPT

## 2022-04-28 PROCEDURE — 80061 LIPID PANEL: CPT

## 2022-04-28 PROCEDURE — 83036 HEMOGLOBIN GLYCOSYLATED A1C: CPT

## 2022-04-28 PROCEDURE — 82043 UR ALBUMIN QUANTITATIVE: CPT

## 2022-04-28 PROCEDURE — 80306 DRUG TEST PRSMV INSTRMNT: CPT

## 2022-05-01 NOTE — RESULT ENCOUNTER NOTE
Kevin,  Those numbers look fantastic!  That A1c is the best we have seen in some time.  So keep it up.  STEW Terry M.D.

## 2022-05-08 ENCOUNTER — HEALTH MAINTENANCE LETTER (OUTPATIENT)
Age: 52
End: 2022-05-08

## 2022-05-19 DIAGNOSIS — E11.65 TYPE 2 DIABETES MELLITUS WITH HYPERGLYCEMIA, WITHOUT LONG-TERM CURRENT USE OF INSULIN (H): ICD-10-CM

## 2022-05-24 DIAGNOSIS — M25.561 CHRONIC PAIN OF RIGHT KNEE: ICD-10-CM

## 2022-05-24 DIAGNOSIS — F11.90 CHRONIC, CONTINUOUS USE OF OPIOIDS: ICD-10-CM

## 2022-05-24 DIAGNOSIS — G89.29 CHRONIC PAIN OF RIGHT KNEE: ICD-10-CM

## 2022-05-24 RX ORDER — OXYCODONE AND ACETAMINOPHEN 7.5; 325 MG/1; MG/1
1-2 TABLET ORAL 3 TIMES DAILY PRN
Qty: 150 TABLET | Refills: 0 | Status: SHIPPED | OUTPATIENT
Start: 2022-05-24 | End: 2022-06-23

## 2022-05-27 NOTE — TELEPHONE ENCOUNTER
Notification of Discharge   This is a Notification of Discharge from our facility [address]  Please be advised that this patient has been discharge from our facility  Below you will find the admission and discharge date and time including the patients disposition  UTILIZATION REVIEW CONTACT:  Dayna Khan  Utilization   Network Utilization Review Department  Phone: [phone] x carefully listen to the prompts  All voicemails are confidential   Email: [email or web address]     PHYSICIAN ADVISORY SERVICES:  FOR [id number] REVIEW - MEDICAL NECESSITY DENIAL  Phone: [phone]  Fax: [phone]  Email: [email or web address]     PRESENTATION DATE: 5/20/2022  5:15 PM  OBERVATION ADMISSION DATE:   INPATIENT ADMISSION DATE: 5/20/22  5:15 PM   DISCHARGE DATE: 5/26/2022 12:58 PM  DISPOSITION: Home/Self Care Home/Self Care      IMPORTANT INFORMATION:  Send all requests for admission clinical reviews, approved or denied determinations and any other requests to dedicated fax number below belonging to the campus where the patient is receiving treatment   List of dedicated fax numbers:  [address] [place] [address] DENIALS (Administrative/Medical Necessity) [phone]   [address]  (Maternity/NICU/Pediatrics) [phone]   Alexia Lopezks [phone]   [address] [facility] [phone]   [address] [place] [address] [phone]   [address] [place] [address] [address] [place] [address],[address]Th [address] [phone]   [facility]  [phone]   [address] [place] [address]  [address] [facility] 1000 [facility] [phone] Pharmacy loaded in chart

## 2022-06-15 NOTE — TELEPHONE ENCOUNTER
This writer attempted to contact pt on 07/15/20      Reason for call lab appt and unable to leave message mailbox full      If patient calls back:   Per Dr. Terry  Refilled percocet x 1 only  Have patient schedule his lab work.  No further refill until labs done         Taisha Banks CMA     UROLOGY PROGRESS NOTE   Patient Identifiers: Melquiades Hassan (MRN [id number])  Date of Service: 6/15/2022    Subjective:    59-year-old female with history of dysfunctional bladder and ureteral stricture disease  She has a little remote history of an ileal conduit diversion for from [facility]  She had a left ureteral stricture with obstructive pyelonephritis which was managed with balloon dilation and stenting  Her follow-up ultrasound showed resolution of hydronephrosis  Current ultrasound is pending  She reports no issues      Reason for visit:  Ileal conduit diversion with left hydronephrosis follow-up      Objective:     VITALS:    Vitals:    06/14/22 0952   BP: 124/76   Pulse: 81   SpO2: 99%           LABS:  Lab Results   Component Value Date    HGB 10 5 (L) 01/18/2021    HCT 33 4 (L) 01/18/2021    WBC 9 11 01/18/2021     01/18/2021   ]    Lab Results   Component Value Date    K 4 1 01/18/2021     (H) 01/18/2021    CO2 21 01/18/2021    BUN 20 01/18/2021    CREATININE 1 01 01/18/2021    CALCIUM 9 7 01/18/2021   ]        INPATIENT MEDS:    Current Outpatient Medications:     ALPRAZolam (XANAX) 0 5 mg tablet, Take 0 25 mg by mouth daily at bedtime as needed Takes 1/2 0 5mg = 0 25 mg @ bedtime prn, Disp: , Rfl: 2    Ascorbic Acid (VITAMIN C PO), Take by mouth daily, Disp: , Rfl:     BIOTIN PO, Take by mouth daily, Disp: , Rfl:     Calcium Polycarbophil (FIBER-CAPS PO), Take by mouth daily, Disp: , Rfl:     Coenzyme Q10 (CO Q-10 PO), Take by mouth daily, Disp: , Rfl:     Cyanocobalamin (B-12 PO), Take by mouth daily, Disp: , Rfl:     FREESTYLE LITE test strip, , Disp: , Rfl:     metFORMIN (GLUCOPHAGE) 1000 MG tablet, Take 500 mg by mouth 2 (two) times a day with meals  , Disp: , Rfl:     pantoprazole (PROTONIX) 40 mg tablet, Take 40 mg by mouth daily in the early morning, Disp: , Rfl:     pramipexole (MIRAPEX) 0 25 mg tablet, Take 0 25 mg by mouth daily at bedtime, Disp: , Rfl:     Probiotic Product (PROBIOTIC DAILY PO), Take by mouth, Disp: , Rfl:     SYNTHROID 75 MCG tablet, Take 75 mcg by mouth daily in the early morning  , Disp: , Rfl:     Trulicity 4 5 IX/3 3MP SOPN, INJECT UNDER THE SKIN 4 5 MG WEEKLY AS DIRECTED, Disp: , Rfl:     acetaminophen (TYLENOL) 325 mg tablet, Take 2 tablets (650 mg total) by mouth every 6 (six) hours as needed for mild pain, headaches or fever (Patient not taking: Reported on 6/14/2022), Disp: , Rfl: 0    acetaminophen (TYLENOL) 325 mg tablet, Take 2 tablets (650 mg total) by mouth every 4 (four) hours as needed for mild pain (Patient not taking: Reported on 6/14/2022), Disp: 30 tablet, Rfl: 0    docusate sodium (COLACE) 100 mg capsule, TAKE 1 CAPSULE (100 MG TOTAL) BY MOUTH 2 (TWO) TIMES A DAY FOR 15 DAYS (Patient not taking: Reported on 5/5/2021), Disp: 30 capsule, Rfl: 0    naproxen (NAPROSYN) 500 mg tablet, Take 1 tablet (500 mg total) by mouth 2 (two) times a day with meals for 5 days For pain, Disp: 10 tablet, Rfl: 0    sodium chloride, PF, 0 9 %, 10 mL by Intracatheter route daily, Disp: 300 mL, Rfl: 3    Trulicity 0 57 YY/0 7HT SOPN, Inject 0 75 mg under the skin once a week (Patient not taking: Reported on 6/14/2022), Disp: , Rfl:     Current Facility-Administered Medications:     cefTRIAXone (ROCEPHIN) injection 1,000 mg, 1,000 mg, Intramuscular, Q24H, Lala Manning PA-C, 1,000 mg at 05/05/21 1459      Physical Exam:   /76 (BP Location: Left arm, Patient Position: Sitting, Cuff Size: Large)   Pulse 81   Ht 5' 7" (1 702 m)   Wt 99 8 kg (220 lb)   SpO2 99%   BMI 34 46 kg/m²   GEN: no acute distress    RESP: breathing comfortably with no accessory muscle use    ABD: soft, non-tender, non-distended   INCISION:    EXT: no significant peripheral edema     RADIOLOGY:   None     Assessment:   1  Ileal conduit urinary diversion  2   History of left ureteral stricture and hydronephrosis     Plan:   -follow-up ultrasound and call with results  -she prefers to follow-up on an as-needed  basis pending the imaging results  -  -

## 2022-06-23 DIAGNOSIS — M25.561 CHRONIC PAIN OF RIGHT KNEE: ICD-10-CM

## 2022-06-23 DIAGNOSIS — F11.90 CHRONIC, CONTINUOUS USE OF OPIOIDS: ICD-10-CM

## 2022-06-23 DIAGNOSIS — G89.29 CHRONIC PAIN OF RIGHT KNEE: ICD-10-CM

## 2022-06-23 RX ORDER — OXYCODONE AND ACETAMINOPHEN 7.5; 325 MG/1; MG/1
1-2 TABLET ORAL 3 TIMES DAILY PRN
Qty: 150 TABLET | Refills: 0 | Status: SHIPPED | OUTPATIENT
Start: 2022-06-23 | End: 2022-07-22

## 2022-06-23 NOTE — TELEPHONE ENCOUNTER
Patient called to clinic to ask for refill on oxyCODONE-acetaminophen (PERCOCET) 7.5-325 MG per tablet  Last written on 5/24/22, #150 with 0 refills.  CSA updated and on file in Media section.    Send to Re[facility] in [place]

## 2022-07-12 NOTE — PROGRESS NOTES
SUBJECTIVE:   Xavier Mcguire is a 47 year old male who presents to clinic today for the following health issues:      Diabetes Follow-up      Patient is checking blood sugars: not at all    Diabetic concerns: None     Symptoms of hypoglycemia (low blood sugar): none     Paresthesias (numbness or burning in feet) or sores: No     Date of last diabetic eye exam: UTD    Hypertension Follow-up      Outpatient blood pressures are not being checked.    Low Salt Diet: low salt    Chronic Pain Follow-Up       Type / Location of Pain: R knee  Analgesia/pain control:       Recent changes:  Up and down      Overall control: Tolerable with discomfort  Activity level/function:      Daily activities:  Able to do all daily activities    Work:  Full time  Adverse effects:  No  Adherance    Taking medication as directed?  Yes    Participating in other treatments: None  Risk Factors:    Sleep:  Fair    Mood/anxiety:  controlled    Recent family or social stressors:  none noted    Other aggravating factors: none  PHQ-9 SCORE 3/14/2016 9/20/2016 3/8/2017   Total Score - - -   Total Score 2 3 7     MIKE-7 SCORE 3/14/2016 9/20/2016 3/8/2017   Total Score - - -   Total Score 1 1 5     Encounter-Level CSA - 08/10/2015:          Controlled Substance Agreement - Scan on 8/12/2015  1:06 PM : [facility] CONTROLLED SUBSTANCE AGREEMENT (below)                      Amount of exercise or physical activity: None    Problems taking medications regularly: Yes - up and down    Medication side effects: none  Diet: regular (no restrictions)    SUBJECTIVE:  Here today in follow-up of diabetes and hypertension. Patient admits that things haven't really gotten any better from a lifestyle standpoint. In fact he's put on more weight and his BMI is now above 40. I discussed the role this is having in poorly controlled diabetes and his ongoing arthritis of his knees. As has been the case for years he admits that he frugally forgets his medications and  "doesn't really exercise and eats too much. I discussed with the patient is not really much I can do if he is not willing to do his part in the program and he admits his culpability. Other than knee pain he physically feels fine with no complaints.    Review of systems otherwise negative.  Past medical, family, and social history reviewed and updated in chart.    OBJECTIVE:  /84 (BP Location: Right arm, Patient Position: Right side, Cuff Size: Adult Large)  Pulse 70  Temp 98.5  F (36.9  C) (Oral)  Resp 16  Ht 1.676 m (5' 6\")  Wt 112.8 kg (248 lb 9.6 oz)  SpO2 96%  BMI 40.13 kg/m2  Alert, pleasant, upbeat, and in no apparent discomfort.  Morbidly obese  S1 and S2 normal, no murmurs, clicks, gallops or rubs. Regular rate and rhythm. Chest is clear; no wheezes or rales. No edema or JVD.  Past labs reviewed with the patient.     ASSESSMENT / PLAN:  (E11.65) Type 2 diabetes mellitus with hyperglycemia, without long-term current use of insulin (H)  (primary encounter diagnosis)  Comment: We will recheck his A1c level but I would be [place] if it is improved much at all. Again discussed compliance and lifestyle improvement  Plan: metFORMIN (GLUCOPHAGE) 1000 MG tablet,         glimepiride (AMARYL) 4 MG tablet, pioglitazone         (ACTOS) 45 MG tablet            (E66.01,  Z68.41) Morbid obesity with BMI of 40.0-44.9, adult (H)  Comment: Discussion as above. Needs to lose weight  Plan:     (I10) Essential hypertension with goal blood pressure less than 130/80  Comment: Controlled on current regimen. Continue to follow his he may need increased therapy  Plan: losartan (COZAAR) 50 MG tablet, chlorthalidone         (HYGROTON) 25 MG tablet, amLODIPine (NORVASC)         10 MG tablet            (M25.561,  G89.29) Chronic pain of right knee  Comment: refilled x 3 - monitored  Plan: oxyCODONE-acetaminophen (PERCOCET) 7.5-325 MG         per tablet, DISCONTINUED:         oxyCODONE-acetaminophen (PERCOCET) 7.5-325 MG        "  per tablet, DISCONTINUED:         oxyCODONE-acetaminophen (PERCOCET) 7.5-325 MG         per tablet            Follow up 6 months or based on results  STEW Terry MD    (Chart documentation completed in part with Dragon voice-recognition software.  Even though reviewed some grammatical, spelling, and word errors may remain.)       [de-identified] : I injected the patient's bilateral knees today with cortisone for o.a.\par \par I discussed at length with the patient the planned steroid and lidocaine injection. The risks, benefits, convalescence and alternatives were reviewed. The possible side effects discussed included but were not limited to: pain, swelling, heat, bleeding, and redness. Symptoms are generally mild but if they are extensive then contact the office. Giving pain relievers by mouth such as NSAIDs or Tylenol can generally treat the reactions to steroid and lidocaine. Rare cases of infection have been noted. Rash, hives and itching may occur post injection. If you have muscle pain or cramps, flushing and or swelling of the face, rapid heart beat, nausea, dizziness, fever, chills, headache, difficulty breathing, swelling in the arms or legs, or have a prickly feeling of your skin, contact a health care provider immediately. Following this discussion, the knee was prepped with Alcohol and under sterile condition the 80 mg Depo-Medrol and 6 cc Lidocaine injection was performed with a 20 gauge needle through a superolateral injection site. The needle was introduced into the joint, aspiration was performed to ensure intra-articular placement and the medication was injected. Upon withdrawal of the needle the site was cleaned with alcohol and a band aid applied. The patient tolerated the injection well and there were no adverse effects. Post injection instructions included no strenuous activity for 24 hours, cryotherapy and if there are any adverse effects to contact the office.

## 2022-07-18 DIAGNOSIS — E11.65 TYPE 2 DIABETES MELLITUS WITH HYPERGLYCEMIA, WITHOUT LONG-TERM CURRENT USE OF INSULIN (H): ICD-10-CM

## 2022-07-22 DIAGNOSIS — M25.561 CHRONIC PAIN OF RIGHT KNEE: ICD-10-CM

## 2022-07-22 DIAGNOSIS — F11.90 CHRONIC, CONTINUOUS USE OF OPIOIDS: ICD-10-CM

## 2022-07-22 DIAGNOSIS — G89.29 CHRONIC PAIN OF RIGHT KNEE: ICD-10-CM

## 2022-07-22 RX ORDER — PROCHLORPERAZINE 25 MG/1
SUPPOSITORY RECTAL
Qty: 1 EACH | Refills: 1 | Status: SHIPPED | OUTPATIENT
Start: 2022-07-22 | End: 2023-03-13

## 2022-07-22 RX ORDER — OXYCODONE AND ACETAMINOPHEN 7.5; 325 MG/1; MG/1
1-2 TABLET ORAL 3 TIMES DAILY PRN
Qty: 150 TABLET | Refills: 0 | Status: SHIPPED | OUTPATIENT
Start: 2022-07-22 | End: 2022-08-22

## 2022-08-22 ENCOUNTER — VIRTUAL VISIT (OUTPATIENT)
Dept: FAMILY MEDICINE | Facility: CLINIC | Age: 52
End: 2022-08-22
Payer: COMMERCIAL

## 2022-08-22 DIAGNOSIS — F11.90 CHRONIC, CONTINUOUS USE OF OPIOIDS: ICD-10-CM

## 2022-08-22 DIAGNOSIS — M25.561 CHRONIC PAIN OF RIGHT KNEE: ICD-10-CM

## 2022-08-22 DIAGNOSIS — E11.65 TYPE 2 DIABETES MELLITUS WITH HYPERGLYCEMIA, WITHOUT LONG-TERM CURRENT USE OF INSULIN (H): Primary | ICD-10-CM

## 2022-08-22 DIAGNOSIS — I10 HYPERTENSION GOAL BP (BLOOD PRESSURE) < 140/90: ICD-10-CM

## 2022-08-22 DIAGNOSIS — Z12.11 SCREEN FOR COLON CANCER: ICD-10-CM

## 2022-08-22 DIAGNOSIS — G89.29 CHRONIC PAIN OF RIGHT KNEE: ICD-10-CM

## 2022-08-22 PROCEDURE — 99214 OFFICE O/P EST MOD 30 MIN: CPT | Mod: GT | Performed by: FAMILY MEDICINE

## 2022-08-22 RX ORDER — GLIMEPIRIDE 4 MG/1
8 TABLET ORAL DAILY
Qty: 180 TABLET | Refills: 1 | Status: SHIPPED | OUTPATIENT
Start: 2022-08-22 | End: 2022-08-22

## 2022-08-22 RX ORDER — PIOGLITAZONEHYDROCHLORIDE 45 MG/1
45 TABLET ORAL DAILY
Qty: 90 TABLET | Refills: 1 | Status: SHIPPED | OUTPATIENT
Start: 2022-08-22 | End: 2022-12-23

## 2022-08-22 RX ORDER — LOSARTAN POTASSIUM 50 MG/1
50 TABLET ORAL DAILY
Qty: 90 TABLET | Refills: 1 | Status: SHIPPED | OUTPATIENT
Start: 2022-08-22 | End: 2022-08-22

## 2022-08-22 RX ORDER — OXYCODONE AND ACETAMINOPHEN 7.5; 325 MG/1; MG/1
1-2 TABLET ORAL 3 TIMES DAILY PRN
Qty: 150 TABLET | Refills: 0 | Status: SHIPPED | OUTPATIENT
Start: 2022-08-22 | End: 2022-09-26

## 2022-08-22 RX ORDER — PIOGLITAZONEHYDROCHLORIDE 45 MG/1
45 TABLET ORAL DAILY
Qty: 90 TABLET | Refills: 1 | Status: SHIPPED | OUTPATIENT
Start: 2022-08-22 | End: 2022-08-22

## 2022-08-22 RX ORDER — AMLODIPINE BESYLATE 10 MG/1
10 TABLET ORAL DAILY
Qty: 90 TABLET | Refills: 1 | Status: SHIPPED | OUTPATIENT
Start: 2022-08-22 | End: 2022-08-22

## 2022-08-22 RX ORDER — LOSARTAN POTASSIUM 50 MG/1
50 TABLET ORAL DAILY
Qty: 90 TABLET | Refills: 1 | Status: SHIPPED | OUTPATIENT
Start: 2022-08-22 | End: 2022-12-23

## 2022-08-22 RX ORDER — AMLODIPINE BESYLATE 10 MG/1
10 TABLET ORAL DAILY
Qty: 90 TABLET | Refills: 1 | Status: SHIPPED | OUTPATIENT
Start: 2022-08-22 | End: 2022-12-23

## 2022-08-22 RX ORDER — CHLORTHALIDONE 25 MG/1
25 TABLET ORAL DAILY
Qty: 90 TABLET | Refills: 1 | Status: SHIPPED | OUTPATIENT
Start: 2022-08-22 | End: 2022-12-23

## 2022-08-22 RX ORDER — CHLORTHALIDONE 25 MG/1
25 TABLET ORAL DAILY
Qty: 90 TABLET | Refills: 1 | Status: SHIPPED | OUTPATIENT
Start: 2022-08-22 | End: 2022-08-22

## 2022-08-22 RX ORDER — GLIMEPIRIDE 4 MG/1
8 TABLET ORAL DAILY
Qty: 180 TABLET | Refills: 1 | Status: SHIPPED | OUTPATIENT
Start: 2022-08-22 | End: 2022-12-23

## 2022-08-22 ASSESSMENT — ANXIETY QUESTIONNAIRES
8. IF YOU CHECKED OFF ANY PROBLEMS, HOW DIFFICULT HAVE THESE MADE IT FOR YOU TO DO YOUR WORK, TAKE CARE OF THINGS AT HOME, OR GET ALONG WITH OTHER PEOPLE?: NOT DIFFICULT AT ALL
1. FEELING NERVOUS, ANXIOUS, OR ON EDGE: SEVERAL DAYS
3. WORRYING TOO MUCH ABOUT DIFFERENT THINGS: NOT AT ALL
2. NOT BEING ABLE TO STOP OR CONTROL WORRYING: NOT AT ALL
GAD7 TOTAL SCORE: 1
7. FEELING AFRAID AS IF SOMETHING AWFUL MIGHT HAPPEN: NOT AT ALL
6. BECOMING EASILY ANNOYED OR IRRITABLE: NOT AT ALL
GAD7 TOTAL SCORE: 1
7. FEELING AFRAID AS IF SOMETHING AWFUL MIGHT HAPPEN: NOT AT ALL
GAD7 TOTAL SCORE: 1
4. TROUBLE RELAXING: NOT AT ALL
IF YOU CHECKED OFF ANY PROBLEMS ON THIS QUESTIONNAIRE, HOW DIFFICULT HAVE THESE PROBLEMS MADE IT FOR YOU TO DO YOUR WORK, TAKE CARE OF THINGS AT HOME, OR GET ALONG WITH OTHER PEOPLE: NOT DIFFICULT AT ALL
5. BEING SO RESTLESS THAT IT IS HARD TO SIT STILL: NOT AT ALL

## 2022-08-22 ASSESSMENT — PATIENT HEALTH QUESTIONNAIRE - PHQ9
SUM OF ALL RESPONSES TO PHQ QUESTIONS 1-9: 2
10. IF YOU CHECKED OFF ANY PROBLEMS, HOW DIFFICULT HAVE THESE PROBLEMS MADE IT FOR YOU TO DO YOUR WORK, TAKE CARE OF THINGS AT HOME, OR GET ALONG WITH OTHER PEOPLE: NOT DIFFICULT AT ALL
SUM OF ALL RESPONSES TO PHQ QUESTIONS 1-9: 2

## 2022-08-22 NOTE — PROGRESS NOTES
Kevin is a 52 year old who is being evaluated via a billable video visit.      How would you like to obtain your AVS? MyChart  If the video visit is dropped, the invitation should be resent by: Text to cell phone: [phone]  Will anyone else be joining your video visit? No      Assessment & Plan     Type 2 diabetes mellitus with hyperglycemia, without long-term current use of insulin (H)  He had actually been under decent control at 7.6 with last recheck.  Hoping this trend continues.  - Hemoglobin A1c; Future  - empagliflozin (JARDIANCE) 10 MG TABS tablet; Take 1 tablet (10 mg) by mouth daily  - glimepiride (AMARYL) 4 MG tablet; Take 2 tablets (8 mg) by mouth daily Profile Rx  - metFORMIN (GLUCOPHAGE) 500 MG tablet; Take 2 tablets (1,000 mg) by mouth 2 times daily (with meals) Profile Rx  - pioglitazone (ACTOS) 45 MG tablet; Take 1 tablet (45 mg) by mouth daily Profile Rx  - Basic metabolic panel  (Ca, Cl, CO2, Creat, Gluc, K, Na, BUN); Future    Hypertension goal BP (blood pressure) < 140/90  Blood pressure readings in the office have been good but at home of been a little bit high lately.  He will follow this more carefully and keep me up-to-date on results  - amLODIPine (NORVASC) 10 MG tablet; Take 1 tablet (10 mg) by mouth daily Profile Rx  - chlorthalidone (HYGROTON) 25 MG tablet; Take 1 tablet (25 mg) by mouth daily Profile Rx  - losartan (COZAAR) 50 MG tablet; Take 1 tablet (50 mg) by mouth daily Profile Rx    Chronic pain of right knee  Stable on current regimen.  Up-to-date on routine monitoring  - oxyCODONE-acetaminophen (PERCOCET) 7.5-325 MG per tablet; Take 1-2 tablets by mouth 3 times daily as needed for pain    Chronic, continuous use of opioids  As above  - oxyCODONE-acetaminophen (PERCOCET) 7.5-325 MG per tablet; Take 1-2 tablets by mouth 3 times daily as needed for pain  - Drug Abuse Screen Panel 13, Urine (Pain Care Package) - lab collect; Future    Screen for colon cancer  Cologcurt             See Patient Instructions    Return in about 3 months (around 11/22/2022) for Follow up on Pain, In Office or Video, can call for refills.    Lizett Terry MD  [facility] SUN Brown is a 52 year old presenting for the following health issues:  Diabetes and Hypertension    History of Present Illness       Diabetes:   He presents for follow up of diabetes.  He is checking home blood glucose with a continuous glucose monitor.  He checks blood glucose before meals, after meals, before and after meals and at bedtime.  Blood glucose is sometimes over 200 and sometimes under 70. He is aware of hypoglycemia symptoms including weakness, lethargy and confusion. He has no concerns regarding his diabetes at this time.  He is not experiencing numbness or burning in feet, excessive thirst, blurry vision, weight changes or redness, sores or blisters on feet.         Hypertension: He presents for follow up of hypertension.  He does not check blood pressure  regularly outside of the clinic. Outside blood pressures have been over 140/90. He does not follow a low salt diet.      Today's PHQ-9         PHQ-9 Total Score: 2    PHQ-9 Q9 Thoughts of better off dead/self-harm past 2 weeks :   Not at all    How difficult have these problems made it for you to do your work, take care of things at home, or get along with other people: Not difficult at all  Today's MIKE-7 Score: 1     Video visit patient today in follow-up of diabetes, hypertension, knee pain.  Doing well.  Reports no interval health concerns.      Review of Systems   Constitutional, HEENT, cardiovascular, pulmonary, gi and gu systems are negative, except as otherwise noted.      Objective           Vitals:  No vitals were obtained today due to virtual visit.    Physical Exam   GENERAL: Healthy, alert and no distress  EYES: Eyes grossly normal to inspection.  No discharge or erythema, or obvious scleral/conjunctival  abnormalities.  RESP: No audible wheeze, cough, or visible cyanosis.  No visible retractions or increased work of breathing.    SKIN: Visible skin clear. No significant rash, abnormal pigmentation or lesions.  NEURO: Cranial nerves grossly intact.  Mentation and speech appropriate for age.  PSYCH: Mentation appears normal, affect normal/bright, judgement and insight intact, normal speech and appearance well-groomed.    Past labs reviewed with the patient.             Video-Visit Details    Video Start Time: 0900    Type of service:  Video Visit    Video End Time:0913    Originating Location (pt. Location): Home    Distant Location (provider location):  [facility]     Platform used for Video Visit: [employer]    .  ..

## 2022-08-25 ENCOUNTER — LAB (OUTPATIENT)
Dept: LAB | Facility: CLINIC | Age: 52
End: 2022-08-25
Payer: COMMERCIAL

## 2022-08-25 DIAGNOSIS — F11.90 CHRONIC, CONTINUOUS USE OF OPIOIDS: ICD-10-CM

## 2022-08-25 DIAGNOSIS — E11.65 TYPE 2 DIABETES MELLITUS WITH HYPERGLYCEMIA, WITHOUT LONG-TERM CURRENT USE OF INSULIN (H): ICD-10-CM

## 2022-08-25 LAB
AMPHETAMINES UR QL: NOT DETECTED
ANION GAP SERPL CALCULATED.3IONS-SCNC: 5 MMOL/L (ref 3–14)
BARBITURATES UR QL SCN: NOT DETECTED
BENZODIAZ UR QL SCN: NOT DETECTED
BUN SERPL-MCNC: 19 MG/DL (ref 7–30)
BUPRENORPHINE UR QL: NOT DETECTED
CALCIUM SERPL-MCNC: 9.7 MG/DL (ref 8.5–10.1)
CANNABINOIDS UR QL: NOT DETECTED
CHLORIDE BLD-SCNC: 101 MMOL/L (ref 94–109)
CO2 SERPL-SCNC: 32 MMOL/L (ref 20–32)
COCAINE UR QL SCN: NOT DETECTED
CREAT SERPL-MCNC: 1.28 MG/DL (ref 0.66–1.25)
D-METHAMPHET UR QL: NOT DETECTED
GFR SERPL CREATININE-BSD FRML MDRD: 67 ML/MIN/1.73M2
GLUCOSE BLD-MCNC: 146 MG/DL (ref 70–99)
HBA1C MFR BLD: 7.9 % (ref 0–5.6)
METHADONE UR QL SCN: NOT DETECTED
OPIATES UR QL SCN: NOT DETECTED
OXYCODONE UR QL SCN: DETECTED
PCP UR QL SCN: NOT DETECTED
POTASSIUM BLD-SCNC: 3.1 MMOL/L (ref 3.4–5.3)
PROPOXYPH UR QL: NOT DETECTED
SODIUM SERPL-SCNC: 138 MMOL/L (ref 133–144)
TRICYCLICS UR QL SCN: NOT DETECTED

## 2022-08-25 PROCEDURE — 36415 COLL VENOUS BLD VENIPUNCTURE: CPT

## 2022-08-25 PROCEDURE — 80306 DRUG TEST PRSMV INSTRMNT: CPT

## 2022-08-25 PROCEDURE — 83036 HEMOGLOBIN GLYCOSYLATED A1C: CPT

## 2022-08-25 PROCEDURE — 80048 BASIC METABOLIC PNL TOTAL CA: CPT

## 2022-09-26 DIAGNOSIS — G89.29 CHRONIC PAIN OF RIGHT KNEE: ICD-10-CM

## 2022-09-26 DIAGNOSIS — M25.561 CHRONIC PAIN OF RIGHT KNEE: ICD-10-CM

## 2022-09-26 DIAGNOSIS — F11.90 CHRONIC, CONTINUOUS USE OF OPIOIDS: ICD-10-CM

## 2022-09-26 RX ORDER — OXYCODONE AND ACETAMINOPHEN 7.5; 325 MG/1; MG/1
1-2 TABLET ORAL 3 TIMES DAILY PRN
Qty: 150 TABLET | Refills: 0 | Status: SHIPPED | OUTPATIENT
Start: 2022-09-26 | End: 2022-10-14

## 2022-09-26 NOTE — TELEPHONE ENCOUNTER
Patient calling for a refill on oxyCODONE-acetaminophen (PERCOCET) 7.5-325 MG per tablet    to be sent to [address]  pharmacy.  Cynthia Delaney MA  [facility]  2nd Floor  Primary Care

## 2022-09-26 NOTE — TELEPHONE ENCOUNTER
Refilled.  Remind him to send in his colon cancer screening kit.  Should have been mailed from [facility] to him.

## 2022-09-30 ENCOUNTER — NURSE TRIAGE (OUTPATIENT)
Dept: NURSING | Facility: CLINIC | Age: 52
End: 2022-09-30

## 2022-09-30 NOTE — TELEPHONE ENCOUNTER
"Patient calling, after a lengthy discussion after he was sent to triage as a red transfer, he verbalized numerous recent concerns, with stress being his biggest concern. Patient has multiple complaints, mostly related to work and family stress. He states that he has a new assignment at work since March, with lots of responsibility and meetings at all times of the day. He is working a lot as well. He also states that his Mom was diagnosed with dementia last summer and had to go to a nursing home. He said he gets no help from his sister with everything that has been going on and is trying to handle everything himself. He also mentioned that he is having a hard time focusing at work and at times feels slightly confused. He mentioned he takes Percocet for knee pain, but states he does not take it at work. Patient also commented that he has had blood sugars as low as 54 because he misses lunch at work due to the heavy responsibility he has, then he overeats and gets his blood sugars up over 300 rapidly. He is unable to give specific times or how often these variations happen, but says it has been going on at least since March and the increased job responsibility. After trying to determine what symptom he would like to have triaged today (he currently is not experiencing any symptoms, but was only calling to schedule an appointment with Dr. Terry to discuss his stress issues). About 20 minutes into the call patient states, \"I am on vacation currently in [place]!\" Advised that I am not able to triage him out of state and if he having any symptoms he should seek care at his current location. Patient said \"no, I just want to make an appointment for when I get back\"  Transferred to central scheduling to get an appointment set up with his PCP. Call back if symptoms develop when he is in [place].  Ginny Ortiz RN  [facility]    "

## 2022-09-30 NOTE — TELEPHONE ENCOUNTER
"  Reason for Disposition    Information only question and nurse able to answer    Additional Information    Negative: Nursing judgment    Negative: Nursing judgment    Negative: Nursing judgment    Negative: Nursing judgment    Answer Assessment - Initial Assessment Questions  1. REASON FOR CALL or QUESTION: \"What is your reason for calling today?\" or \"How can I best help you?\" or \"What question do you have that I can help answer?\"      Appointment to discuss stress    Protocols used: INFORMATION ONLY CALL - NO TRIAGE-A-OH    "

## 2022-10-05 ENCOUNTER — OFFICE VISIT (OUTPATIENT)
Dept: FAMILY MEDICINE | Facility: CLINIC | Age: 52
End: 2022-10-05
Payer: COMMERCIAL

## 2022-10-05 VITALS
OXYGEN SATURATION: 97 % | WEIGHT: 212 LBS | HEART RATE: 72 BPM | TEMPERATURE: 98 F | SYSTOLIC BLOOD PRESSURE: 131 MMHG | RESPIRATION RATE: 14 BRPM | BODY MASS INDEX: 35.32 KG/M2 | DIASTOLIC BLOOD PRESSURE: 77 MMHG | HEIGHT: 65 IN

## 2022-10-05 DIAGNOSIS — I10 HYPERTENSION GOAL BP (BLOOD PRESSURE) < 140/90: ICD-10-CM

## 2022-10-05 DIAGNOSIS — Z12.11 SCREEN FOR COLON CANCER: ICD-10-CM

## 2022-10-05 DIAGNOSIS — Z23 ENCOUNTER FOR IMMUNIZATION: ICD-10-CM

## 2022-10-05 DIAGNOSIS — E11.65 TYPE 2 DIABETES MELLITUS WITH HYPERGLYCEMIA, WITHOUT LONG-TERM CURRENT USE OF INSULIN (H): Primary | ICD-10-CM

## 2022-10-05 LAB
ANION GAP SERPL CALCULATED.3IONS-SCNC: 8 MMOL/L (ref 3–14)
BUN SERPL-MCNC: 16 MG/DL (ref 7–30)
CALCIUM SERPL-MCNC: 9.6 MG/DL (ref 8.5–10.1)
CHLORIDE BLD-SCNC: 108 MMOL/L (ref 94–109)
CO2 SERPL-SCNC: 20 MMOL/L (ref 20–32)
CREAT SERPL-MCNC: 0.98 MG/DL (ref 0.66–1.25)
GFR SERPL CREATININE-BSD FRML MDRD: >90 ML/MIN/1.73M2
GLUCOSE BLD-MCNC: 104 MG/DL (ref 70–99)
HBA1C MFR BLD: 7.5 % (ref 0–5.6)
POTASSIUM BLD-SCNC: ABNORMAL MMOL/L
SODIUM SERPL-SCNC: 136 MMOL/L (ref 133–144)

## 2022-10-05 PROCEDURE — 36415 COLL VENOUS BLD VENIPUNCTURE: CPT | Performed by: FAMILY MEDICINE

## 2022-10-05 PROCEDURE — 84295 ASSAY OF SERUM SODIUM: CPT | Performed by: FAMILY MEDICINE

## 2022-10-05 PROCEDURE — 90682 RIV4 VACC RECOMBINANT DNA IM: CPT | Performed by: FAMILY MEDICINE

## 2022-10-05 PROCEDURE — 83036 HEMOGLOBIN GLYCOSYLATED A1C: CPT | Performed by: FAMILY MEDICINE

## 2022-10-05 PROCEDURE — 82374 ASSAY BLOOD CARBON DIOXIDE: CPT | Performed by: FAMILY MEDICINE

## 2022-10-05 PROCEDURE — 91313 COVID-19,PF,MODERNA BIVALENT: CPT | Performed by: FAMILY MEDICINE

## 2022-10-05 PROCEDURE — 82565 ASSAY OF CREATININE: CPT | Performed by: FAMILY MEDICINE

## 2022-10-05 PROCEDURE — 82435 ASSAY OF BLOOD CHLORIDE: CPT | Performed by: FAMILY MEDICINE

## 2022-10-05 PROCEDURE — 84520 ASSAY OF UREA NITROGEN: CPT | Performed by: FAMILY MEDICINE

## 2022-10-05 PROCEDURE — 82947 ASSAY GLUCOSE BLOOD QUANT: CPT | Performed by: FAMILY MEDICINE

## 2022-10-05 PROCEDURE — 82310 ASSAY OF CALCIUM: CPT | Performed by: FAMILY MEDICINE

## 2022-10-05 PROCEDURE — 90471 IMMUNIZATION ADMIN: CPT | Performed by: FAMILY MEDICINE

## 2022-10-05 PROCEDURE — 99214 OFFICE O/P EST MOD 30 MIN: CPT | Mod: 25 | Performed by: FAMILY MEDICINE

## 2022-10-05 PROCEDURE — 0134A COVID-19,PF,MODERNA BIVALENT: CPT | Performed by: FAMILY MEDICINE

## 2022-10-05 ASSESSMENT — PAIN SCALES - GENERAL: PAINLEVEL: MODERATE PAIN (4)

## 2022-10-05 ASSESSMENT — ANXIETY QUESTIONNAIRES
3. WORRYING TOO MUCH ABOUT DIFFERENT THINGS: NEARLY EVERY DAY
GAD7 TOTAL SCORE: 14
7. FEELING AFRAID AS IF SOMETHING AWFUL MIGHT HAPPEN: NEARLY EVERY DAY
8. IF YOU CHECKED OFF ANY PROBLEMS, HOW DIFFICULT HAVE THESE MADE IT FOR YOU TO DO YOUR WORK, TAKE CARE OF THINGS AT HOME, OR GET ALONG WITH OTHER PEOPLE?: VERY DIFFICULT
5. BEING SO RESTLESS THAT IT IS HARD TO SIT STILL: SEVERAL DAYS
2. NOT BEING ABLE TO STOP OR CONTROL WORRYING: MORE THAN HALF THE DAYS
1. FEELING NERVOUS, ANXIOUS, OR ON EDGE: MORE THAN HALF THE DAYS
IF YOU CHECKED OFF ANY PROBLEMS ON THIS QUESTIONNAIRE, HOW DIFFICULT HAVE THESE PROBLEMS MADE IT FOR YOU TO DO YOUR WORK, TAKE CARE OF THINGS AT HOME, OR GET ALONG WITH OTHER PEOPLE: VERY DIFFICULT
GAD7 TOTAL SCORE: 14
4. TROUBLE RELAXING: SEVERAL DAYS
7. FEELING AFRAID AS IF SOMETHING AWFUL MIGHT HAPPEN: NEARLY EVERY DAY
6. BECOMING EASILY ANNOYED OR IRRITABLE: MORE THAN HALF THE DAYS

## 2022-10-05 ASSESSMENT — PATIENT HEALTH QUESTIONNAIRE - PHQ9
SUM OF ALL RESPONSES TO PHQ QUESTIONS 1-9: 9
10. IF YOU CHECKED OFF ANY PROBLEMS, HOW DIFFICULT HAVE THESE PROBLEMS MADE IT FOR YOU TO DO YOUR WORK, TAKE CARE OF THINGS AT HOME, OR GET ALONG WITH OTHER PEOPLE: SOMEWHAT DIFFICULT
SUM OF ALL RESPONSES TO PHQ QUESTIONS 1-9: 9

## 2022-10-05 NOTE — PATIENT INSTRUCTIONS
At [facility], we strive to deliver an exceptional experience to you, every time we see you. If you receive a survey, please complete it as we do value your feedback.  If you have MyChart, you can expect to receive results automatically within 24 hours of their completion.  Your provider will send a note interpreting your results as well.   If you do not have MyChart, you should receive your results in about a week by mail.    Your care team:                            Family Medicine Internal Medicine   MD Andres Gonzalez MD Shantel Branch-Fleming, MD Srinivasa Vaka, MD Katya Belousova, NAMRATA Thakur CNP, MD ([facility]   Buddy Kaminski, MD Sussy Montalvo MD Amelia Massimini APRN CNP Kim Thein, APRN CNP Bethany Templen, MD             Clinic hours: Monday - Thursday 7 am-6 pm; Fridays 7 am-5 pm.   Urgent care: Monday - Friday 10 am- 8 pm; Saturday and Sunday 9 am-5 pm.    Clinic: [phone]       [facility]: Monday - Thursday 8 am - 7 pm; Friday 8 am - 6 pm  [facility]: [phone]

## 2022-10-05 NOTE — PROGRESS NOTES
Leia Brown is a 52 year old presenting for the following health issues:  No chief complaint on file.    Pt says it feels like his head is all over the place with everything going on. Feels like a fog. Working a ton and caring for his mom. Not right eating habits and drinking pop when he needs to bring up blood sugar. Maybe drinking too many. He says he is tired and sleeping asen't been the same, he takes naps through out the day. He says this is effecting his work now. He has not eaten today. His head, neck, shoulders, and knee all hurt in some way.     HPI       Diabetes Follow-up      How often are you checking your blood sugar?   Pt checking a few takes a day but not as much as he would like    What concerns do you have today about your diabetes? None and Low blood sugar and high blood sugar, goes up and down      Do you have any of these symptoms? (Select all that apply)  Numbness in feet and Blurry vision      BP Readings from Last 2 Encounters:   11/25/21 136/70   08/17/21 136/78     Hemoglobin A1C (%)   Date Value   08/25/2022 7.9 (H)   04/28/2022 7.6 (H)   05/18/2021 8.7 (H)   02/16/2021 8.3 (H)     LDL Cholesterol Calculated (mg/dL)   Date Value   04/28/2022 64   02/21/2022 59   12/18/2020 77   02/19/2020 65         Hypertension Follow-up      Do you check your blood pressure regularly outside of the clinic? No     Are you following a low salt diet? Yes is trying     Are your blood pressures ever more than 140 on the top number (systolic) OR more   than 90 on the bottom number (diastolic), for example 140/90? No  Pt does not know    How many servings of fruits and vegetables do you eat daily?  0-1    On average, how many sweetened beverages do you drink each day (Examples: soda, juice, sweet tea, etc.  Do NOT count diet or artificially sweetened beverages)?   3    How many days per week do you exercise enough to make your heart beat faster? 3 or less    How many minutes a day do you exercise enough  "to make your heart beat faster? 9 or less  How many days per week do you miss taking your medication? 1    What makes it hard for you to take your medications?  remembering to take      Review of Systems   Constitutional, HEENT, cardiovascular, pulmonary, GI, , musculoskeletal, neuro, skin, endocrine and psych systems are negative, except as otherwise noted.      Objective    /77 (BP Location: Left arm, Patient Position: Sitting, Cuff Size: Adult Large)   Pulse 72   Temp 98  F (36.7  C) (Oral)   Resp 14   Ht 1.651 m (5' 5\")   Wt 96.2 kg (212 lb)   SpO2 97%   BMI 35.28 kg/m    Body mass index is 35.28 kg/m .  Physical Exam   GENERAL: healthy, alert and no distress  NECK: no adenopathy, no asymmetry, masses, or scars and thyroid normal to palpation  RESP: lungs clear to auscultation - no rales, rhonchi or wheezes  CV: regular rate and rhythm, normal S1 S2, no S3 or S4, no murmur, click or rub, no peripheral edema and peripheral pulses strong  ABDOMEN: soft, nontender, no hepatosplenomegaly, no masses and bowel sounds normal  MS: no gross musculoskeletal defects noted, no edema  Diabetic foot exam: normal DP and PT pulses, no trophic changes or ulcerative lesions and normal sensory exam    A/P:    (E11.65) Type 2 diabetes mellitus with hyperglycemia, without long-term current use of insulin (H)  (primary encounter diagnosis)  Comment:   Plan: Hemoglobin A1c, Basic metabolic panel  (Ca, Cl,        CO2, Creat, Gluc, K, Na, BUN), AMB Adult         Diabetes Educator Referral        a1c elevated. Patient having some lows. Patient not eating a balanced diet, lots of processed food. Would like patient to see a diabetic educator. Patient would like some time off to help with sugar control to feel better at work. Letter given to patient today. RTC in 1.5 months for recheck.    (I10) Hypertension goal BP (blood pressure) < 140/90  Comment:   Plan: Basic metabolic panel  (Ca, Cl, CO2, Creat,         Gluc, K, Na, " BUN)        Controlled.    (Z12.11) Screen for colon cancer  Comment:   Plan: COLOGUARD([employer] SCIENCES)            (Z23) Encounter for immunization  Comment:   Plan: INFLUENZA QUAD, RECOMBINANT, P-FREE (RIV4)         (FLUBLOK) AGE 50-64 [OHR297],         COVID-19,PF,MODERNA BIVALENT 18+Yrs            Ventura Garcia MD

## 2022-10-05 NOTE — LETTER
[address] [address]  Phone: [phone]    October 5, 2022        Xavier Mcguire  [address] [facility] [address]          To whom it may concern:    RE: Xavier Mcguire    Patient was seen and treated today at our clinic and missed work. Patient will follow up in about 1.5 month for follow up for work status evaluation.    Please contact me for questions or concerns.      Sincerely,        Ventura Garica MD

## 2022-10-05 NOTE — NURSING NOTE
Prior to immunization administration, verified patients identity using patient s name and date of birth. Please see Immunization Activity for additional information.     Screening Questionnaire for Adult Immunization    Are you sick today?   No   Do you have allergies to medications, food, a vaccine component or latex?   No   Have you ever had a serious reaction after receiving a vaccination?   No   Do you have a long-term health problem with heart, lung, kidney, or metabolic disease (e.g., diabetes), asthma, a blood disorder, no spleen, complement component deficiency, a cochlear implant, or a spinal fluid leak?  Are you on long-term aspirin therapy?   No   Do you have cancer, leukemia, HIV/AIDS, or any other immune system problem?   No   Do you have a parent, brother, or sister with an immune system problem?   No   In the past 3 months, have you taken medications that affect  your immune system, such as prednisone, other steroids, or anticancer drugs; drugs for the treatment of rheumatoid arthritis, Crohn s disease, or psoriasis; or have you had radiation treatments?   No   Have you had a seizure, or a brain or other nervous system problem?   No   During the past year, have you received a transfusion of blood or blood    products, or been given immune (gamma) globulin or antiviral drug?   No   For women: Are you pregnant or is there a chance you could become       pregnant during the next month?   No   Have you received any vaccinations in the past 4 weeks?   No     Immunization questionnaire answers were all negative.        Per orders of Dr. Garcia, injection of Influenza flublok and Bivalent Moderna given by Regine Thompson MA. Patient instructed to remain in clinic for 15 minutes afterwards, and to report any adverse reaction to me immediately.       Screening performed by Regine Thompson MA on 10/5/2022 at 10:49 AM.

## 2022-10-10 ENCOUNTER — TELEPHONE (OUTPATIENT)
Dept: FAMILY MEDICINE | Facility: CLINIC | Age: 52
End: 2022-10-10

## 2022-10-10 NOTE — TELEPHONE ENCOUNTER
Received forms and placed in Dr Garcia's box.  Cynthia Delaney MA  [facility]  2nd Floor  Primary Care

## 2022-10-10 NOTE — TELEPHONE ENCOUNTER
Forms    Type of form/letter:  Form received from  [employer], placed on Dr. Terry's desk for signature    Have you been seen for this request:    Do we have the form/letter:     When is form/letter needed by:     How would you like the form/letter returned:     Patient Notified form requests are processed in 3-5 business days:    Okay to leave a detailed message?:

## 2022-10-10 NOTE — TELEPHONE ENCOUNTER
I think this should go to Dr. Ventura Garcia at [place].  I am his primary doctor, but he specifically discussed this issue with Dr. Garcia who wrote the initial letter.  So I will place the form back in the basket

## 2022-10-11 ENCOUNTER — TELEPHONE (OUTPATIENT)
Dept: FAMILY MEDICINE | Facility: CLINIC | Age: 52
End: 2022-10-11

## 2022-10-11 NOTE — TELEPHONE ENCOUNTER
I had it yesterday but needs to be sent over to Dr. Garcia at [place].  It is probably either in route or there

## 2022-10-11 NOTE — TELEPHONE ENCOUNTER
Paperwork was faxed to Dr. Garcia at [place] 10/10/22 and sent over by interoffice mail 10/10/22

## 2022-10-14 ENCOUNTER — VIRTUAL VISIT (OUTPATIENT)
Dept: FAMILY MEDICINE | Facility: CLINIC | Age: 52
End: 2022-10-14
Payer: COMMERCIAL

## 2022-10-14 DIAGNOSIS — E11.65 TYPE 2 DIABETES MELLITUS WITH HYPERGLYCEMIA, WITHOUT LONG-TERM CURRENT USE OF INSULIN (H): ICD-10-CM

## 2022-10-14 DIAGNOSIS — E66.01 SEVERE OBESITY (BMI 35.0-39.9) WITH COMORBIDITY (H): ICD-10-CM

## 2022-10-14 DIAGNOSIS — G89.29 CHRONIC PAIN OF RIGHT KNEE: ICD-10-CM

## 2022-10-14 DIAGNOSIS — M25.561 CHRONIC PAIN OF RIGHT KNEE: ICD-10-CM

## 2022-10-14 DIAGNOSIS — F11.90 CHRONIC, CONTINUOUS USE OF OPIOIDS: ICD-10-CM

## 2022-10-14 DIAGNOSIS — R51.9 BAD HEADACHE: ICD-10-CM

## 2022-10-14 DIAGNOSIS — F43.9 STRESS: Primary | ICD-10-CM

## 2022-10-14 PROCEDURE — 99214 OFFICE O/P EST MOD 30 MIN: CPT | Mod: GT | Performed by: FAMILY MEDICINE

## 2022-10-14 RX ORDER — OXYCODONE AND ACETAMINOPHEN 7.5; 325 MG/1; MG/1
1-2 TABLET ORAL 3 TIMES DAILY PRN
Qty: 150 TABLET | Refills: 0 | Status: SHIPPED | OUTPATIENT
Start: 2022-10-24 | End: 2022-11-01

## 2022-10-14 RX ORDER — DULOXETIN HYDROCHLORIDE 30 MG/1
30 CAPSULE, DELAYED RELEASE ORAL 2 TIMES DAILY
Qty: 30 CAPSULE | Refills: 0 | Status: SHIPPED | OUTPATIENT
Start: 2022-10-14 | End: 2023-02-14

## 2022-10-14 NOTE — PROGRESS NOTES
Kevin is a 52 year old who is being evaluated via a billable video visit.      How would you like to obtain your AVS? MyChart  If the video visit is dropped, the invitation should be resent by: Text to cell phone: [phone]  Will anyone else be joining your video visit? No          Assessment & Plan     Stress  Patient had recently seen Dr. Garcia and that record is reviewed with him and in his chart.  Lots of sources of stress including a mother with dementia who is now in a care facility.  Not a lot of family help some most things fall to him.  There has been an increased workload and he is a very valued worker but is just not able to keep up with some of that because of everything going on lifestyle helm.  So he and Dr. Garcia talked about a 4 to 6-week leave of absence.  I think this is a good idea and we will help accommodate for this.  In the meantime we need to work on both his physical and mental status.  Discussed measures and I like to start him on antianxiety treatment and I think Cymbalta might be a good choice given his other pain and headache issues.  - DULoxetine (CYMBALTA) 30 MG capsule; Take 1 capsule (30 mg) by mouth 2 times daily For stress and anxiety    Bad headache  I think this is triggered by stress and not representative of something worse like a brain tumor or similar.  So we work on the stress aspect and have some medicine for as needed use  - nabumetone (RELAFEN) 750 MG tablet; Take 1 tablet (750 mg) by mouth 2 times daily as needed for pain    Type 2 diabetes mellitus with hyperglycemia, without long-term current use of insulin (H)  A1c 7.5 is actually acceptable but he admits that diet and lifestyle are terrible and have gotten worse with stress so he wants to take this opportunity while off of work to work on improving his lifestyle.  We will get him plugged in with diabetes education and nutrition  - AMB Adult Diabetes Educator Referral; Future    Severe obesity (BMI 35.0-39.9) with  "comorbidity (H)  As above  - AMB Adult Diabetes Educator Referral; Future    Chronic pain of right knee  Stable on current regimen.  Continue same plan and routine follow-up.   - oxyCODONE-acetaminophen (PERCOCET) 7.5-325 MG per tablet; Take 1-2 tablets by mouth 3 times daily as needed for pain    Chronic, continuous use of opioids  Stable and up-to-date on routine monitoring  - oxyCODONE-acetaminophen (PERCOCET) 7.5-325 MG per tablet; Take 1-2 tablets by mouth 3 times daily as needed for pain         BMI:   Estimated body mass index is 35.28 kg/m  as calculated from the following:    Height as of 10/5/22: 1.651 m (5' 5\").    Weight as of 10/5/22: 96.2 kg (212 lb).   Weight management plan: Discussed healthy diet and exercise guidelines    See Patient Instructions    Return in about 2 weeks (around 10/28/2022) for Contact me with eigital, [facility] message.    Lizett Terry MD  [facility]    Leia Brown is a 52 year old, presenting for the following health issues:  No chief complaint on file.      HPI     Visit with patient today to talk about stress and headaches and follow-up on pain and diabetes    Review of Systems   Constitutional, HEENT, cardiovascular, pulmonary, gi and gu systems are negative, except as otherwise noted.      Objective           Vitals:  No vitals were obtained today due to virtual visit.    Physical Exam   GENERAL: Healthy, alert and no distress  EYES: Eyes grossly normal to inspection.  No discharge or erythema, or obvious scleral/conjunctival abnormalities.  RESP: No audible wheeze, cough, or visible cyanosis.  No visible retractions or increased work of breathing.    SKIN: Visible skin clear. No significant rash, abnormal pigmentation or lesions.  NEURO: Cranial nerves grossly intact.  Mentation and speech appropriate for age.  PSYCH: Mentation appears normal, affect normal/bright, judgement and insight intact, normal speech and appearance " well-groomed.    Past labs reviewed with the patient.             Video-Visit Details    Video Start Time: 0930    Type of service:  Video Visit    Video End Time:0955    Originating Location (pt. Location): Home        Distant Location (provider location):  Off-site    Platform [facility] for Video Visit: [facility]

## 2022-10-24 DIAGNOSIS — E11.65 TYPE 2 DIABETES MELLITUS WITH HYPERGLYCEMIA, WITHOUT LONG-TERM CURRENT USE OF INSULIN (H): ICD-10-CM

## 2022-10-25 ENCOUNTER — TELEPHONE (OUTPATIENT)
Dept: FAMILY MEDICINE | Facility: CLINIC | Age: 52
End: 2022-10-25

## 2022-10-25 RX ORDER — PROCHLORPERAZINE 25 MG/1
SUPPOSITORY RECTAL
Qty: 3 EACH | Refills: 5 | Status: SHIPPED | OUTPATIENT
Start: 2022-10-25 | End: 2024-04-29

## 2022-10-25 NOTE — TELEPHONE ENCOUNTER
Reason for Call:  Medication or medication refill:    Do you use a [facility]?  Name of the pharmacy and phone number for the current request:     [facility] - Continuous Blood Gluc Sensor (DEXCOM G6 SENSOR) [facility]  oxyCODONE-acetaminophen (PERCOCET) 7.5-325 MG per tablet    Can we leave a detailed message on this number? YES    Phone number patient can be reached at: Home number on file [phone] (home)    Best Time: anytime     Call taken on 10/25/2022 at 12:59 PM by Magy Becerra

## 2022-10-25 NOTE — TELEPHONE ENCOUNTER
Team, please return call to patient and update:    1) Dexcom G6 sensors were sent to [facility] pharmacy on [address] yesterday. Should have on file.    2) Percocet was refilled to [facility] in [place] on [address] on 10/14/22 (time of virtual visit with PCP), with a start date of okay to fill on 10/24/22.  Please clarify, is this not the pharmacy to send Percocet to? Did patient ever  that prescription from [facility]?   Do see has been refilled to [facility] in the past.      Route encounter to provider to re-send Percocet prescription, if needs to be sent to alternative pharmacy. Otherwise notify patient should be on file at the [facility].  Thank you.      Erika Killian RN  [facility]

## 2022-10-31 ENCOUNTER — TELEPHONE (OUTPATIENT)
Dept: FAMILY MEDICINE | Facility: CLINIC | Age: 52
End: 2022-10-31

## 2022-10-31 NOTE — TELEPHONE ENCOUNTER
Writer attempted to contact patient in regards to refill request below.     Per patient's acitve medication list the prescription below was refilled on 10/24/22.     Disp Refills Start End ALEJANDRA   oxyCODONE-acetaminophen (PERCOCET) 7.5-325 MG per tablet 150 tablet 0 10/24/2022  No   Sig - Route: Take 1-2 tablets by mouth 3 times daily as needed for pain - Oral   Sent to pharmacy as: oxyCODONE-Acetaminophen 7.5-325 MG Oral Tablet (PERCOCET)   Class: E-Prescribe   Earliest Fill Date: 10/24/2022   Order: [id number]   E-Prescribing Status: Receipt confirmed by pharmacy (10/14/2022  9:55 AM CDT)   No prior authorization was found for this prescription.   Found prior authorization for another prescription for the same medication: Approved     If patient calls back please update that prescription was refilled on 10/24/22 and would not be due for a refill at this time.     Danica Deleon RN, BSN  [facility]

## 2022-10-31 NOTE — TELEPHONE ENCOUNTER
Reason for Call:  Medication or medication refill:    Do you use a [facility]?  Name of the pharmacy and phone number for the current request:  [facility] [place] [address] AT [address] [phone]    Name of the medication requested: oxyCODONE-acetaminophen (PERCOCET) 7.5-325 MG per tablet    Other request: No    Can we leave a detailed message on this number? Yes     Phone number patient can be reached at: [phone]    Best Time: Anytime     Call taken on 10/31/2022 at 8:48 AM by Nithya Stuart

## 2022-11-01 ENCOUNTER — MYC MEDICAL ADVICE (OUTPATIENT)
Dept: FAMILY MEDICINE | Facility: CLINIC | Age: 52
End: 2022-11-01

## 2022-11-01 ENCOUNTER — ALLIED HEALTH/NURSE VISIT (OUTPATIENT)
Dept: EDUCATION SERVICES | Facility: CLINIC | Age: 52
End: 2022-11-01
Attending: FAMILY MEDICINE
Payer: COMMERCIAL

## 2022-11-01 DIAGNOSIS — F11.90 CHRONIC, CONTINUOUS USE OF OPIOIDS: ICD-10-CM

## 2022-11-01 DIAGNOSIS — G89.29 CHRONIC PAIN OF RIGHT KNEE: ICD-10-CM

## 2022-11-01 DIAGNOSIS — F43.9 STRESS: ICD-10-CM

## 2022-11-01 DIAGNOSIS — M25.561 CHRONIC PAIN OF RIGHT KNEE: ICD-10-CM

## 2022-11-01 DIAGNOSIS — E66.01 SEVERE OBESITY (BMI 35.0-39.9) WITH COMORBIDITY (H): ICD-10-CM

## 2022-11-01 DIAGNOSIS — E11.65 TYPE 2 DIABETES MELLITUS WITH HYPERGLYCEMIA, WITHOUT LONG-TERM CURRENT USE OF INSULIN (H): ICD-10-CM

## 2022-11-01 PROCEDURE — G0108 DIAB MANAGE TRN  PER INDIV: HCPCS | Performed by: DIETITIAN, REGISTERED

## 2022-11-01 RX ORDER — OXYCODONE AND ACETAMINOPHEN 7.5; 325 MG/1; MG/1
1-2 TABLET ORAL 3 TIMES DAILY PRN
Qty: 150 TABLET | Refills: 0 | Status: SHIPPED | OUTPATIENT
Start: 2022-11-01 | End: 2022-11-30

## 2022-11-01 NOTE — PROGRESS NOTES
Diabetes Self-Management Education & Support    Presents for: Individual review    Type of Service: In Person Visit    Assessment Type:   REPORTS:                    Pt verbalized understanding of concepts discussed and recommendations provided today.       Continue education with the following diabetes management concepts: Healthy Eating, Being Active, Monitoring, Taking Medication and Reducing Risks    ASSESSMENT:  Despite patient improved a1c his blood sugars, lifestyle, and taking medications are erratic.  He is frequently missing doses of medications, eating fast or convenience foods often, and over correcting lows resulting in rebounding highs.  He is motivated to improve his lifestyle and set goals to work towards.  He is unsure how his medications work and which ones can cause lows.     Glucose Patterns & Trends:  No clear patterns identified      PLAN  Goal: delete fast food apps    Goal: 4 days with planned meals per week. With the other 3 days of the week no more than 2 fast food/easy meals.     Think of a reward to get self once goals are achieved.    Get a medication storage container to set up weekly.    CDE to request mental health referral for talk therapy per pts request.  Topics to cover at upcoming visits: Healthy Eating, Being Active, Monitoring and Taking Medication    Follow-up: 11/6/22    See Care Plan for co-developed, patient-state behavior change goals.  AVS provided for patient today.    Education Materials Provided:  [facility] Healthy Living with Diabetes Book      SUBJECTIVE/OBJECTIVE:  Presents for: Individual review  Accompanied by: Self, Spouse  Diabetes education in the past 24mo: No  Focus of Visit: Monitoring, Taking Medication, Assistance w/ making life changes, Self-care behavioral goal setting, Healthy Eating, Problem Solving  Diabetes type: Type 2  Date of diagnosis: 17+ years ago  Disease course: Getting harder to manage  How confident are you filling out medical forms  "by yourself:: Quite a bit  Diabetes management related comments/concerns: I need to eat better.  I get these lows and then over correct, It feels like its out of control  Transportation concerns: No  Difficulty affording diabetes medication?: No  Difficulty affording diabetes testing supplies?: No  Other concerns:: None  Cultural Influences/Ethnic Background:  Not  or     Diabetes Symptoms & Complications:  Fatigue: Yes  Neuropathy: No  Polydipsia: No  Polyuria: No  Visual change: No  Slow healing wounds: No  Weight trend: Decreasing       Patient Problem List and Family Medical History reviewed for relevant medical history, current medical status, and diabetes risk factors.    Vitals:  There were no vitals taken for this visit.  Estimated body mass index is 35.28 kg/m  as calculated from the following:    Height as of 10/5/22: 1.651 m (5' 5\").    Weight as of 10/5/22: 96.2 kg (212 lb).   Last 3 BP:   BP Readings from Last 3 Encounters:   10/05/22 131/77   11/25/21 136/70   08/17/21 136/78       History   Smoking Status     Never   Smokeless Tobacco     Never       Labs:  Lab Results   Component Value Date    A1C 7.5 10/05/2022    A1C 8.7 05/18/2021     Lab Results   Component Value Date     10/05/2022     05/18/2021     Lab Results   Component Value Date    LDL 64 04/28/2022    LDL 77 12/18/2020     HDL Cholesterol   Date Value Ref Range Status   12/18/2020 48 >39 mg/dL Final     Direct Measure HDL   Date Value Ref Range Status   04/28/2022 46 >=40 mg/dL Final   ]  GFR Estimate   Date Value Ref Range Status   10/05/2022 >90 >60 mL/min/1.73m2 Final     Comment:     Effective December 21, 2021 eGFRcr in adults is calculated using the 2021 CKD-EPI creatinine equation which includes age and gender (Mary guerrero al., NEJM, DOI: 10.1056/JSWVna4118109)   05/18/2021 86 >60 mL/min/[1.73_m2] Final     Comment:     Non  GFR Calc  Starting 12/18/2018, serum creatinine based estimated GFR " (eGFR) will be   calculated using the Chronic Kidney Disease Epidemiology Collaboration   (CKD-EPI) equation.       GFR Estimate If Black   Date Value Ref Range Status   05/18/2021 >90 >60 mL/min/[1.73_m2] Final     Comment:      GFR Calc  Starting 12/18/2018, serum creatinine based estimated GFR (eGFR) will be   calculated using the Chronic Kidney Disease Epidemiology Collaboration   (CKD-EPI) equation.       Lab Results   Component Value Date    CR 0.98 10/05/2022    CR 1.00 05/18/2021     No results found for: MICROALBUMIN    Healthy Eating:  Healthy Eating Assessed Today: Yes  Cultural/Confucianist diet restrictions?: No  Meal planning/habits: None  How many times a week on average do you eat food made away from home (restaurant/take-out)?: 5+  Meals include: Breakfast, Lunch, Dinner  Breakfast: mcdonalds-sausage egg and cheese mcgriddle and hashbrown large- ketchup- large diet coke  Dinner: totinos pizza- 3 meat at 10am  Beverages: Diet soda, Water  Has patient met with a dietitian in the past?: Yes    Pandemic- microwave, airfryer- more convenience foods    Spam, salami- sandwiches- with chips    Last veg- corn- days-weeks ago  Last fruit- a few bananas- 1 pc water melon this week     Being Active:  Being Active Assessed Today: Yes  Exercise:: Currently not exercising  Barrier to exercise: Physical limitation, Time    Monitoring:  Monitoring Assessed Today: Yes  Did patient bring glucose meter to appointment? : Yes  Blood Glucose Meter: CGM  Times checking blood sugar at home (number): 5+  Times checking blood sugar at home (per): Day  Blood glucose trend: Fluctuating    Uses dexcom and cell phone.  Has not put one on in a few days.    Taking Medications:  Diabetes Medication(s)     Biguanides       metFORMIN (GLUCOPHAGE) 500 MG tablet    Take 2 tablets (1,000 mg) by mouth 2 times daily (with meals) Profile Rx    Sodium-Glucose Co-Transporter 2 (SGLT2) Inhibitors       empagliflozin (JARDIANCE) 10  MG TABS tablet    Take 1 tablet (10 mg) by mouth daily    Sulfonylureas       glimepiride (AMARYL) 4 MG tablet    Take 2 tablets (8 mg) by mouth daily Profile Rx    Insulin Sensitizing Agents       pioglitazone (ACTOS) 45 MG tablet    Take 1 tablet (45 mg) by mouth daily Profile Rx        Misses morning medications -1-2 times a week   Evenings- forgets 3-4 times a week     Taking Medication Assessed Today: Yes  Current Treatments: Oral Medication (taken by mouth)  Problems taking diabetes medications regularly?: Yes  Diabetes medication side effects?: Yes (sometimes GI upset with Metformin)    Problem Solving:  Problem Solving Assessed Today: Yes  Is the patient at risk for hypoglycemia?: Yes  Hypoglycemia Frequency: Daily  Hypoglycemia Treatment: Candy, Other food  Medical ID: No  Is the patient at risk for DKA?: No    Hypoglycemia symptoms  Headaches: Yes  Hunger: Yes  Speech difficulty: Yes         Reducing Risks:  Diabetes Risks: Age over 45 years, Sedentary Lifestyle  CAD Risks: Sedentary lifestyle, Stress, Diabetes Mellitus, Family history, Hypertension, Male sex, Obesity    Healthy Coping:  Emotional response to diabetes: Ready to learn, Concern for health and well-being, Depression, Guilt/Self-blame  Informal Support system:: Family  Stage of change: PREPARATION (Decided to change - considering how)  Support resources: None  Patient Activation Measure Survey Score:  JAKI Score (Last Two) 5/23/2012 6/24/2013   JAKI Raw Score 38 41   Activation Score 52.9 63.2   JAKI Level 2 3         Care Plan and Education Provided:  Care Plan: Diabetes   Updates made by Macie Wetzel RD since 11/1/2022 12:00 AM      Problem: HbA1C Not In Goal       Goal: Establish Regular Follow-Ups with PCP       Task: Discuss with PCP the recommended timing for patient's next follow up visit(s) Completed 11/1/2022   Responsible User: Macie Wetzel RD      Task: Discuss schedule for PCP visits with patient Completed 11/1/2022    Responsible User: Macie Wetzel RD      Goal: Get HbA1C Level in Goal       Task: Educate patient on diabetes education self-management topics Completed 11/1/2022   Responsible User: Macie Wetzel RD      Task: Educate patient on benefits of regular glucose monitoring    Responsible User: Macie Wetzel RD      Task: Refer patient to appropriate extended care team member, as needed (Medication Therapy Management, Behavioral Health, Physical Therapy, etc.) Completed 11/1/2022   Responsible User: Macie Wetzel RD      Task: Discuss diabetes treatment plan with patient Completed 11/1/2022   Responsible User: Macie Wetzel RD      Problem: Diabetes Self-Management Education Needed to Optimize Self-Care Behaviors       Goal: Understand diabetes pathophysiology and disease progression       Task: Provide education on diabetes pathophysiology and disease progression specfic to patient's diabetes type Completed 11/1/2022   Responsible User: Macie Wetzel RD      Goal: Healthy Eating - follow a healthy eating pattern for diabetes    This Visit's Progress: 10%   Note:    Goal: delete fast food apps    Goal: 4 days with planned meals per week. With the other 3 days of the week no more than 2 fast food/easy meals.      Task: Provide education on portion control and consistency in amount, composition and timing of food intake Completed 11/1/2022   Responsible User: Macie Wetzel RD      Task: Provide education on managing carbohydrate intake (carbohydrate counting, plate planning method, etc.)    Responsible User: Macie Wetzel RD      Task: Provide education on weight management Completed 11/1/2022   Responsible User: Macie Wetzel RD      Task: Provide education on heart healthy eating Completed 11/1/2022   Responsible User: Macie Wetzel RD      Task: Provide education on eating out Completed 11/1/2022   Responsible User: Macie Wetzel RD      Task: Develop individualized healthy eating plan  with patient Completed 11/1/2022   Responsible User: Macie Wetzel RD      Goal: Being Active - get regular physical activity, working up to at least 150 minutes per week       Task: Provide education on relationship of activity to glucose and precautions to take if at risk for low glucose Completed 11/1/2022   Responsible User: Macie Wetzel RD      Task: Discuss barriers to physical activity with patient Completed 11/1/2022   Responsible User: Macie Wetzel RD      Task: Develop physical activity plan with patient    Responsible User: Macie Wetzel RD      Task: Explore community resources including walking groups, assistance programs, and home videos Completed 11/1/2022   Responsible User: Macie Wetzel RD      Goal: Monitoring - monitor glucose and ketones as directed       Task: Provide education on blood glucose monitoring (purpose, proper technique, frequency, glucose targets, interpreting results, when to use glucose control solution, sharps disposal) Completed 11/1/2022   Responsible User: Macie Wetzel RD      Task: Provide education on continuous glucose monitoring (sensor placement, use of hossein or /reader, understanding glucose trends, alerts and alarms, differences between sensor glucose and blood glucose) Completed 11/1/2022   Responsible User: Macie Wetzel RD      Task: Provide education on ketone monitoring (when to monitor, frequency, etc.)    Responsible User: Macie Wetzel RD      Goal: Taking Medication - patient is consistently taking medications as directed    This Visit's Progress: 10%   Note:    I will purchase and set up a weekly pill organizer to help take my medications consistently.     Task: Provide education on action of prescribed medication, including when to take and possible side effects Completed 11/1/2022   Responsible User: Macie Wetzel RD      Task: Provide education on insulin and injectable diabetes medications, including administration,  storage, site selection and rotation for injection sites    Responsible User: Macie Wetzel RD      Task: Discuss barriers to medication adherence with patient and provide management technique ideas as appropriate Completed 11/1/2022   Responsible User: Macie Wetzel RD      Task: Provide education on frequency and refill details of medications Completed 11/1/2022   Responsible User: Macie Wetzel RD      Goal: Problem Solving - know how to prevent and manage short-term diabetes complications       Task: Provide education on high blood glucose - causes, signs/symptoms, prevention and treatment    Responsible User: Macie Wetzel RD      Task: Provide education on low blood glucose - causes, signs/symptoms, prevention, treatment, carrying a carbohydrate source at all times, and medical identification    Responsible User: Macie Wetzel RD      Task: Provide education on safe travel with diabetes    Responsible User: Macie Wetzel RD      Task: Provide education on how to care for diabetes on sick days    Responsible User: Macie Wetzel RD      Task: Provide education on when to call a health care provider    Responsible User: Macie Wetzel RD      Goal: Reducing Risks - know how to prevent and treat long-term diabetes complications       Task: Provide education on major complications of diabetes, prevention, early diagnostic measures and treatment of complications    Responsible User: Macie Wetzel RD      Task: Provide education on recommended care for dental, eye and foot health    Responsible User: Macie Wetzel RD      Task: Provide education on Hemoglobin A1c - goals and relationship to blood glucose levels    Responsible User: Macie Wetzel RD      Task: Provide education on recommendations for heart health - lipid levels and goals, blood pressure and goals, and aspirin therapy, if indicated    Responsible User: Macie Wetzel RD      Task: Provide education on tobacco cessation     Responsible User: Macie Wetzel RD      Goal: Healthy Coping - use available resources to cope with the challenges of managing diabetes    This Visit's Progress: 10%   Note:    I will schedule one or more professional counseling sessions to discuss my mental health.      Task: Discuss recognizing feelings about having diabetes    Responsible User: Macie Wetzel RD      Task: Provide education on the benefits of making appropriate lifestyle changes    Responsible User: Macie Wetzel RD      Task: Provide education on benefits of utilizing support systems Completed 11/1/2022   Responsible User: Macie Wetzel RD      Task: Discuss methods for coping with stress Completed 11/1/2022   Responsible User: Macie Wetzel RD      Task: Provide education on when to seek professional counseling Completed 11/1/2022   Responsible User: Macie Wetzel RD Hilary Wilde MS, RD, LD, CDE    Time Spent: 90 minutes  Encounter Type: Individual    Any diabetes medication dose changes were made via the CDE Protocol per the patient's primary care provider. A copy of this encounter was shared with the provider.

## 2022-11-01 NOTE — LETTER
11/1/2022         RE: Xavier Mcguire  [address]  [place] [address]        Dear Colleague,    Thank you for referring your patient, Xavier Mcguire, to the [facility]. Please see a copy of my visit note below.    Diabetes Self-Management Education & Support    Presents for: Individual review    Type of Service: In Person Visit    Assessment Type:   REPORTS:                    Pt verbalized understanding of concepts discussed and recommendations provided today.       Continue education with the following diabetes management concepts: Healthy Eating, Being Active, Monitoring, Taking Medication and Reducing Risks    ASSESSMENT:  Despite patient improved a1c his blood sugars, lifestyle, and taking medications are erratic.  He is frequently missing doses of medications, eating fast or convenience foods often, and over correcting lows resulting in rebounding highs.  He is motivated to improve his lifestyle and set goals to work towards.  He is unsure how his medications work and which ones can cause lows.     Glucose Patterns & Trends:  No clear patterns identified      PLAN  Goal: delete fast food apps    Goal: 4 days with planned meals per week. With the other 3 days of the week no more than 2 fast food/easy meals.     Think of a reward to get self once goals are achieved.    Get a medication storage container to set up weekly.    CDE to request mental health referral for talk therapy per pts request.  Topics to cover at upcoming visits: Healthy Eating, Being Active, Monitoring and Taking Medication    Follow-up: 11/6/22    See Care Plan for co-developed, patient-state behavior change goals.  AVS provided for patient today.    Education Materials Provided:  [facility] Healthy Living with Diabetes Book      SUBJECTIVE/OBJECTIVE:  Presents for: Individual review  Accompanied by: Self, Spouse  Diabetes education in the past 24mo: No  Focus of Visit: Monitoring,  "Taking Medication, Assistance w/ making life changes, Self-care behavioral goal setting, Healthy Eating, Problem Solving  Diabetes type: Type 2  Date of diagnosis: 17+ years ago  Disease course: Getting harder to manage  How confident are you filling out medical forms by yourself:: Quite a bit  Diabetes management related comments/concerns: I need to eat better.  I get these lows and then over correct, It feels like its out of control  Transportation concerns: No  Difficulty affording diabetes medication?: No  Difficulty affording diabetes testing supplies?: No  Other concerns:: None  Cultural Influences/Ethnic Background:  Not  or     Diabetes Symptoms & Complications:  Fatigue: Yes  Neuropathy: No  Polydipsia: No  Polyuria: No  Visual change: No  Slow healing wounds: No  Weight trend: Decreasing       Patient Problem List and Family Medical History reviewed for relevant medical history, current medical status, and diabetes risk factors.    Vitals:  There were no vitals taken for this visit.  Estimated body mass index is 35.28 kg/m  as calculated from the following:    Height as of 10/5/22: 1.651 m (5' 5\").    Weight as of 10/5/22: 96.2 kg (212 lb).   Last 3 BP:   BP Readings from Last 3 Encounters:   10/05/22 131/77   11/25/21 136/70   08/17/21 136/78       History   Smoking Status     Never   Smokeless Tobacco     Never       Labs:  Lab Results   Component Value Date    A1C 7.5 10/05/2022    A1C 8.7 05/18/2021     Lab Results   Component Value Date     10/05/2022     05/18/2021     Lab Results   Component Value Date    LDL 64 04/28/2022    LDL 77 12/18/2020     HDL Cholesterol   Date Value Ref Range Status   12/18/2020 48 >39 mg/dL Final     Direct Measure HDL   Date Value Ref Range Status   04/28/2022 46 >=40 mg/dL Final   ]  GFR Estimate   Date Value Ref Range Status   10/05/2022 >90 >60 mL/min/1.73m2 Final     Comment:     Effective December 21, 2021 eGFRcr in adults is calculated " using the 2021 CKD-EPI creatinine equation which includes age and gender (Mary guerrero al., [place], DOI: 10.1056/CENZoh6154817)   05/18/2021 86 >60 mL/min/[1.73_m2] Final     Comment:     Non  GFR Calc  Starting 12/18/2018, serum creatinine based estimated GFR (eGFR) will be   calculated using the Chronic Kidney Disease Epidemiology Collaboration   (CKD-EPI) equation.       GFR Estimate If Black   Date Value Ref Range Status   05/18/2021 >90 >60 mL/min/[1.73_m2] Final     Comment:      GFR Calc  Starting 12/18/2018, serum creatinine based estimated GFR (eGFR) will be   calculated using the Chronic Kidney Disease Epidemiology Collaboration   (CKD-EPI) equation.       Lab Results   Component Value Date    CR 0.98 10/05/2022    CR 1.00 05/18/2021     No results found for: MICROALBUMIN    Healthy Eating:  Healthy Eating Assessed Today: Yes  Cultural/Judaism diet restrictions?: No  Meal planning/habits: None  How many times a week on average do you eat food made away from home (restaurant/take-out)?: 5+  Meals include: Breakfast, Lunch, Dinner  Breakfast: mcdonalds-sausage egg and cheese mcgridfaina and hashbrown large- ketchup- large diet coke  Dinner: totinos pizza- 3 meat at 10am  Beverages: Diet soda, Water  Has patient met with a dietitian in the past?: Yes    Pandemic- microwave, airfryer- more convenience foods    Spam, salami- sandwiches- with chips    Last veg- corn- days-weeks ago  Last fruit- a few bananas- 1 pc water melon this week     Being Active:  Being Active Assessed Today: Yes  Exercise:: Currently not exercising  Barrier to exercise: Physical limitation, Time    Monitoring:  Monitoring Assessed Today: Yes  Did patient bring glucose meter to appointment? : Yes  Blood Glucose Meter: CGM  Times checking blood sugar at home (number): 5+  Times checking blood sugar at home (per): Day  Blood glucose trend: Fluctuating    Uses dexcom and cell phone.  Has not put one on in a few  days.    Taking Medications:  Diabetes Medication(s)     Biguanides       metFORMIN (GLUCOPHAGE) 500 MG tablet    Take 2 tablets (1,000 mg) by mouth 2 times daily (with meals) Profile Rx    Sodium-Glucose Co-Transporter 2 (SGLT2) Inhibitors       empagliflozin (JARDIANCE) 10 MG TABS tablet    Take 1 tablet (10 mg) by mouth daily    Sulfonylureas       glimepiride (AMARYL) 4 MG tablet    Take 2 tablets (8 mg) by mouth daily Profile Rx    Insulin Sensitizing Agents       pioglitazone (ACTOS) 45 MG tablet    Take 1 tablet (45 mg) by mouth daily Profile Rx        Misses morning medications -1-2 times a week   Evenings- forgets 3-4 times a week     Taking Medication Assessed Today: Yes  Current Treatments: Oral Medication (taken by mouth)  Problems taking diabetes medications regularly?: Yes  Diabetes medication side effects?: Yes (sometimes GI upset with Metformin)    Problem Solving:  Problem Solving Assessed Today: Yes  Is the patient at risk for hypoglycemia?: Yes  Hypoglycemia Frequency: Daily  Hypoglycemia Treatment: Candy, Other food  Medical ID: No  Is the patient at risk for DKA?: No    Hypoglycemia symptoms  Headaches: Yes  Hunger: Yes  Speech difficulty: Yes         Reducing Risks:  Diabetes Risks: Age over 45 years, Sedentary Lifestyle  CAD Risks: Sedentary lifestyle, Stress, Diabetes Mellitus, Family history, Hypertension, Male sex, Obesity    Healthy Coping:  Emotional response to diabetes: Ready to learn, Concern for health and well-being, Depression, Guilt/Self-blame  Informal Support system:: Family  Stage of change: PREPARATION (Decided to change - considering how)  Support resources: None  Patient Activation Measure Survey Score:  JAKI Score (Last Two) 5/23/2012 6/24/2013   JAKI Raw Score 38 41   Activation Score 52.9 63.2   JAKI Level 2 3         Care Plan and Education Provided:  Care Plan: Diabetes   Updates made by Macie Wetzel RD since 11/1/2022 12:00 AM      Problem: HbA1C Not In Goal        Goal: Establish Regular Follow-Ups with PCP       Task: Discuss with PCP the recommended timing for patient's next follow up visit(s) Completed 11/1/2022   Responsible User: Macie Wetzel RD      Task: Discuss schedule for PCP visits with patient Completed 11/1/2022   Responsible User: Macie Wetzel RD      Goal: Get HbA1C Level in Goal       Task: Educate patient on diabetes education self-management topics Completed 11/1/2022   Responsible User: Macie Wetzel RD      Task: Educate patient on benefits of regular glucose monitoring    Responsible User: Macie Wetzel RD      Task: Refer patient to appropriate extended care team member, as needed (Medication Therapy Management, Behavioral Health, Physical Therapy, etc.) Completed 11/1/2022   Responsible User: Macie Wetzel RD      Task: Discuss diabetes treatment plan with patient Completed 11/1/2022   Responsible User: Macie Wetzel RD      Problem: Diabetes Self-Management Education Needed to Optimize Self-Care Behaviors       Goal: Understand diabetes pathophysiology and disease progression       Task: Provide education on diabetes pathophysiology and disease progression specfic to patient's diabetes type Completed 11/1/2022   Responsible User: Macie Wetzel RD      Goal: Healthy Eating - follow a healthy eating pattern for diabetes    This Visit's Progress: 10%   Note:    Goal: delete fast food apps    Goal: 4 days with planned meals per week. With the other 3 days of the week no more than 2 fast food/easy meals.      Task: Provide education on portion control and consistency in amount, composition and timing of food intake Completed 11/1/2022   Responsible User: Macie Wetzel RD      Task: Provide education on managing carbohydrate intake (carbohydrate counting, plate planning method, etc.)    Responsible User: Macie Wetzel RD      Task: Provide education on weight management Completed 11/1/2022   Responsible User: Macie Wetzel  RD      Task: Provide education on heart healthy eating Completed 11/1/2022   Responsible User: Macie Wetzel RD      Task: Provide education on eating out Completed 11/1/2022   Responsible User: Macie Wetzel RD      Task: Develop individualized healthy eating plan with patient Completed 11/1/2022   Responsible User: Macie Wetzel RD      Goal: Being Active - get regular physical activity, working up to at least 150 minutes per week       Task: Provide education on relationship of activity to glucose and precautions to take if at risk for low glucose Completed 11/1/2022   Responsible User: Macie Wetzel RD      Task: Discuss barriers to physical activity with patient Completed 11/1/2022   Responsible User: Macie Wetzel RD      Task: Develop physical activity plan with patient    Responsible User: Macie Wetzel RD      Task: Explore community resources including walking groups, assistance programs, and home videos Completed 11/1/2022   Responsible User: Macei Wetzel RD      Goal: Monitoring - monitor glucose and ketones as directed       Task: Provide education on blood glucose monitoring (purpose, proper technique, frequency, glucose targets, interpreting results, when to use glucose control solution, sharps disposal) Completed 11/1/2022   Responsible User: Macie Wetzel RD      Task: Provide education on continuous glucose monitoring (sensor placement, use of hossein or /reader, understanding glucose trends, alerts and alarms, differences between sensor glucose and blood glucose) Completed 11/1/2022   Responsible User: Macie Wetzel RD      Task: Provide education on ketone monitoring (when to monitor, frequency, etc.)    Responsible User: Macie Wetzel RD      Goal: Taking Medication - patient is consistently taking medications as directed    This Visit's Progress: 10%   Note:    I will purchase and set up a weekly pill organizer to help take my medications consistently.      Task: Provide education on action of prescribed medication, including when to take and possible side effects Completed 11/1/2022   Responsible User: Macie Wetzel RD      Task: Provide education on insulin and injectable diabetes medications, including administration, storage, site selection and rotation for injection sites    Responsible User: Macie Wetzel RD      Task: Discuss barriers to medication adherence with patient and provide management technique ideas as appropriate Completed 11/1/2022   Responsible User: Macie Wetzel RD      Task: Provide education on frequency and refill details of medications Completed 11/1/2022   Responsible User: Macie Wetzel RD      Goal: Problem Solving - know how to prevent and manage short-term diabetes complications       Task: Provide education on high blood glucose - causes, signs/symptoms, prevention and treatment    Responsible User: Macie Wetzel RD      Task: Provide education on low blood glucose - causes, signs/symptoms, prevention, treatment, carrying a carbohydrate source at all times, and medical identification    Responsible User: Macie Wetzel RD      Task: Provide education on safe travel with diabetes    Responsible User: Macie Wetzel RD      Task: Provide education on how to care for diabetes on sick days    Responsible User: Macie Wetzel RD      Task: Provide education on when to call a health care provider    Responsible User: Macie Wetzel RD      Goal: Reducing Risks - know how to prevent and treat long-term diabetes complications       Task: Provide education on major complications of diabetes, prevention, early diagnostic measures and treatment of complications    Responsible User: Macie Wetzel RD      Task: Provide education on recommended care for dental, eye and foot health    Responsible User: Macie Wetzel RD      Task: Provide education on Hemoglobin A1c - goals and relationship to blood glucose levels     Responsible User: Macie Wetzel RD      Task: Provide education on recommendations for heart health - lipid levels and goals, blood pressure and goals, and aspirin therapy, if indicated    Responsible User: Macie Wetzel RD      Task: Provide education on tobacco cessation    Responsible User: Macie Wetzel RD      Goal: Healthy Coping - use available resources to cope with the challenges of managing diabetes    This Visit's Progress: 10%   Note:    I will schedule one or more professional counseling sessions to discuss my mental health.      Task: Discuss recognizing feelings about having diabetes    Responsible User: Macie Wetzel RD      Task: Provide education on the benefits of making appropriate lifestyle changes    Responsible User: Macie Wetzel RD      Task: Provide education on benefits of utilizing support systems Completed 11/1/2022   Responsible User: Macie Wetzel RD      Task: Discuss methods for coping with stress Completed 11/1/2022   Responsible User: Macie Wetzel RD      Task: Provide education on when to seek professional counseling Completed 11/1/2022   Responsible User: Macie Wetzel RD Hilary Wilde MS, RD, LD, CDE    Time Spent: 90 minutes  Encounter Type: Individual    Any diabetes medication dose changes were made via the CDE Protocol per the patient's primary care provider. A copy of this encounter was shared with the provider.

## 2022-11-01 NOTE — TELEPHONE ENCOUNTER
Called and spoke to patient about refill request. Patient states he lasted picked up at Pharmacy on 9/26/22 and states pharmacy did not have a prescription for him for October. Informed patient that per chart, provider sent in Percocet prescription on 11/1/2022 and to reach out to pharmacy directly for refills. Patient verbalized understanding and denies further needs at this time.

## 2022-11-01 NOTE — PATIENT INSTRUCTIONS
Glimepiride- tells pancrease to send out more insulin- will pull sugar out of the blood stream- (can cause lows) take before breakfast  4mg and before dinner 4 mg    Jardiance- lowers threshold where kidneys will release sugar into your urine. - can cause lows- or more frequent urination if you are frequently above 180    Metformin- helps get lower fasting blood sugars- tells the liver to not send out so much extra sugar- can cause diarrhea- if this happens ask for extended release     Actos- sensitizes muscles    Exercise- walk-, swimming- masters group - [place] and rec- masters/open swim  [employer]-  [employer]- has pool access usually    Goal: delete fast food apps    Goal: 4 days with planned meals per week. With the other 3 days of the week no more than 2 fast food/easy meals.     Rewards- think of something that would take care of you- spa/sports tickets/trip/item you look at that reminds you of success of the goal

## 2022-11-16 ENCOUNTER — TELEPHONE (OUTPATIENT)
Dept: FAMILY MEDICINE | Facility: CLINIC | Age: 52
End: 2022-11-16

## 2022-11-16 NOTE — TELEPHONE ENCOUNTER
Reason for Call:  Message     Detailed comments: patient is wanting to get his FMLA extended to 8 weeks  - submitted this previously   Sent [employer] message so provider can refer to that message.     Phone Number Patient can be reached at: Home number on file [phone] (home)    Best Time: anytime    Can we leave a detailed message on this number? YES    Call taken on 11/16/2022 at 12:40 PM by Magy Becerra

## 2022-11-16 NOTE — TELEPHONE ENCOUNTER
Happy to do so, but I do not have a form and I cannot find previous form in his chart.  I can certainly write an extension letter I was going to reference the previous form for dates, etc.  So if we can help clarify this I will happily do so.

## 2022-11-16 NOTE — LETTER
[address] [address]  Phone: [phone]    November 18, 2022        Xavier Mcguire  [address] [facility] [place] [address]          To whom it may concern:    RE: Xavier Mcguire    I am the primary physician for Mr. Xavier Mcguire.  I recommend medical leave of absence for 8 weeks, covering 10/26/2022 - 12/21/2022.    Please contact me for questions or concerns.      Sincerely,        Lizett Terry MD

## 2022-11-18 NOTE — TELEPHONE ENCOUNTER
Called and spoke to pt. He said a letter would be fine. The first date he missed was  Wed 10/26 and he is requesting 8 weeks which would put him out through Wed 12/21. He would like the letter faxed to Rachelle at [phone].  Karen Wallis CMA

## 2022-11-30 DIAGNOSIS — M25.561 CHRONIC PAIN OF RIGHT KNEE: ICD-10-CM

## 2022-11-30 DIAGNOSIS — E11.65 TYPE 2 DIABETES MELLITUS WITH HYPERGLYCEMIA, WITHOUT LONG-TERM CURRENT USE OF INSULIN (H): ICD-10-CM

## 2022-11-30 DIAGNOSIS — G89.29 CHRONIC PAIN OF RIGHT KNEE: ICD-10-CM

## 2022-11-30 DIAGNOSIS — F11.90 CHRONIC, CONTINUOUS USE OF OPIOIDS: ICD-10-CM

## 2022-11-30 RX ORDER — OXYCODONE AND ACETAMINOPHEN 7.5; 325 MG/1; MG/1
1-2 TABLET ORAL 3 TIMES DAILY PRN
Qty: 150 TABLET | Refills: 0 | Status: SHIPPED | OUTPATIENT
Start: 2022-12-01 | End: 2022-12-23

## 2022-11-30 NOTE — TELEPHONE ENCOUNTER
Reason for Call:  Medication or medication refill:    Do you use a [facility]?  Name of the pharmacy and phone number for the current request:  [facility] [place] - [phone]    Name of the medication requested: oxyCODONE     Other request: Would like to have this refilled    Can we leave a detailed message on this number? YES    Phone number patient can be reached at: Cell number on file:    Telephone Information:   Mobile [phone]       Best Time: Anytime    Call taken on 11/30/2022 at 9:04 AM by Shalonda Rudolph

## 2022-12-06 ENCOUNTER — TELEPHONE (OUTPATIENT)
Dept: EDUCATION SERVICES | Facility: CLINIC | Age: 52
End: 2022-12-06

## 2022-12-06 NOTE — TELEPHONE ENCOUNTER
SUBJECTIVE:    Patient ID: Martell Corcoran is a 65 y.o. male.    Chief Complaint: 6 month follow up    Pt here to follow up on acute and chronic conditions (Anxiety, Pepcid, HTN, CAD (65%), HLD, GERD)    Heartburn is doing ok on nexium.  (failed prilosec,protonix)    BP is doing ok today. Weight is stable. Since last visit, pt was dx with Afib. Was put on Eliquis and toprol. Was being seen by Dr. Miranda. No longer wishes to go to [place] and wants a doctor in [place] and in the network. Had an angiogram and told has a 50% blockage in one of his arteries    Has dx of GLENNA and has been using CPAP. Had PSG upstairs. Has been sleeping ok, but concerned because his face is swollen when he wakes up. He is using nasal pillow right now.      Has not been able to remember much since he had COVID in 2020.  Still doesn't have any sense of taste or smell.     Anxiety is doing ok.     Continues to be part of the smoking cessation program. Still smoking. Was down to 3 cigarettes a day. used to be a 1.5 ppd.  Sometimes up to 1/2 ppd. No using nicotine patches a day.     Has a chronic cough.  His allergies are bothering him a lot. Takes otc allergy med.     Has an appt with Dr. Pham ([place]). Has had tuberculin (urothelial cell CA)bladder infusions, needs to have another cystoscopy, that he was supposed to do in Feb. Had to postpone due to a UTI, followed by a yeast infection.     Had labs done: fBS 88, LDL 84, TSH 1.10.     Dr. Navaror changed his nexium from 20 to 40mg.   -----------------------------------------------  claudio 2011 ([place])      Lab Visit on 10/04/2021   Component Date Value Ref Range Status    COVID-19 (SARS CoV-2) IgG Antibody* 10/04/2021 91283.2 (A) <50.0 AU/ml Final    COVID-19 (SARS CoV-2) IgG Antibody* 10/04/2021 Positive (A) Negative Final   Office Visit on 09/22/2021   Component Date Value Ref Range Status    Glucose 03/04/2022 88  65 - 99 mg/dL Final    BUN 03/04/2022 16  7 - 25 mg/dL  Spoke with patient re: diabetes education appointment. Patient states he cancelled appointment today.  Declines to reschedule at this time.  Confirmed he has the phone number to call for rescheduling when ready.   Svetlana De Oliveira, MPH, RD, CDCES, LD 12/6/2022     Final    Creatinine 03/04/2022 0.95  0.70 - 1.25 mg/dL Final    eGFR if non African American 03/04/2022 83  > OR = 60 mL/min/1.73m2 Final    eGFR if African American 03/04/2022 96  > OR = 60 mL/min/1.73m2 Final    BUN/Creatinine Ratio 03/04/2022 NOT APPLICABLE  6 - 22 (calc) Final    Sodium 03/04/2022 140  135 - 146 mmol/L Final    Potassium 03/04/2022 4.5  3.5 - 5.3 mmol/L Final    Chloride 03/04/2022 101  98 - 110 mmol/L Final    CO2 03/04/2022 29  20 - 32 mmol/L Final    Calcium 03/04/2022 9.5  8.6 - 10.3 mg/dL Final    Total Protein 03/04/2022 7.0  6.1 - 8.1 g/dL Final    Albumin 03/04/2022 4.4  3.6 - 5.1 g/dL Final    Globulin, Total 03/04/2022 2.6  1.9 - 3.7 g/dL (calc) Final    Albumin/Globulin Ratio 03/04/2022 1.7  1.0 - 2.5 (calc) Final    Total Bilirubin 03/04/2022 0.7  0.2 - 1.2 mg/dL Final    Alkaline Phosphatase 03/04/2022 65  35 - 144 U/L Final    AST 03/04/2022 20  10 - 35 U/L Final    ALT 03/04/2022 19  9 - 46 U/L Final    Cholesterol 03/04/2022 152  <200 mg/dL Final    HDL 03/04/2022 51  > OR = 40 mg/dL Final    Triglycerides 03/04/2022 77  <150 mg/dL Final    LDL Cholesterol 03/04/2022 84  mg/dL (calc) Final    HDL/Cholesterol Ratio 03/04/2022 3.0  <5.0 (calc) Final    Non HDL Chol. (LDL+VLDL) 03/04/2022 101  <130 mg/dL (calc) Final    TSH w/reflex to FT4 03/04/2022 1.10  0.40 - 4.50 mIU/L Final    Color, UA 03/04/2022 YELLOW  YELLOW Final    Appearance, UA 03/04/2022 CLEAR  CLEAR Final    Specific Gravity, UA 03/04/2022 1.018  1.001 - 1.035 Final    pH, UA 03/04/2022 6.0  5.0 - 8.0 Final    Glucose, UA 03/04/2022 NEGATIVE  NEGATIVE Final    Bilirubin, UA 03/04/2022 NEGATIVE  NEGATIVE Final    Ketones, UA 03/04/2022 TRACE (A) NEGATIVE Final    Occult Blood UA 03/04/2022 NEGATIVE  NEGATIVE Final    Protein, UA 03/04/2022 NEGATIVE  NEGATIVE Final    Nitrite, UA 03/04/2022 NEGATIVE  NEGATIVE Final    Leukocytes, UA 03/04/2022 1+ (A) NEGATIVE Final    WBC Casts, UA  03/04/2022 6-10 (A) < OR = 5 /HPF Final    RBC Casts, UA 03/04/2022 3-10 (A) < OR = 2 /HPF Final    Squam Epithel, UA 03/04/2022 0-5  < OR = 5 /HPF Final    Bacteria, UA 03/04/2022 NONE SEEN  NONE SEEN /HPF Final    Hyaline Casts, UA 03/04/2022 NONE SEEN  NONE SEEN /LPF Final    Reflexive Urine Culture 03/04/2022    Final    Urine Culture, Routine 03/04/2022    Final    WBC 03/04/2022 7.3  3.8 - 10.8 Thousand/uL Final    RBC 03/04/2022 5.23  4.20 - 5.80 Million/uL Final    Hemoglobin 03/04/2022 16.3  13.2 - 17.1 g/dL Final    Hematocrit 03/04/2022 49.2  38.5 - 50.0 % Final    MCV 03/04/2022 94.1  80.0 - 100.0 fL Final    MCH 03/04/2022 31.2  27.0 - 33.0 pg Final    MCHC 03/04/2022 33.1  32.0 - 36.0 g/dL Final    RDW 03/04/2022 13.0  11.0 - 15.0 % Final    Platelets 03/04/2022 236  140 - 400 Thousand/uL Final    MPV 03/04/2022 8.9  7.5 - 12.5 fL Final    Neutrophils, Abs 03/04/2022 4,709  1,500 - 7,800 cells/uL Final    Lymph # 03/04/2022 1,570  850 - 3,900 cells/uL Final    Mono # 03/04/2022 832  200 - 950 cells/uL Final    Eos # 03/04/2022 161  15 - 500 cells/uL Final    Baso # 03/04/2022 29  0 - 200 cells/uL Final    Neutrophils Relative 03/04/2022 64.5  % Final    Lymph % 03/04/2022 21.5  % Final    Mono % 03/04/2022 11.4  % Final    Eosinophil % 03/04/2022 2.2  % Final    Basophil % 03/04/2022 0.4  % Final       Past Medical History:   Diagnosis Date    Benign enlargement of prostate     Had a biopsy in around 2015    Elevated PSA     GERD (gastroesophageal reflux disease)     Hypertension     Started with meds in 2012.    Kidney stone     Urinary tract infection      Social History     Socioeconomic History    Marital status: Significant Other   Tobacco Use    Smoking status: Current Every Day Smoker     Packs/day: 1.00     Years: 55.00     Pack years: 55.00     Types: Cigarettes    Smokeless tobacco: Never Used   Substance and Sexual Activity    Alcohol use: Yes     Comment:  Previous 12 beer a day for 20 years. Now occ.    Drug use: No     Social Determinants of Health     Financial Resource Strain: High Risk    Difficulty of Paying Living Expenses: Very hard   Food Insecurity: Food Insecurity Present    Worried About Running Out of Food in the Last Year: Often true    Ran Out of Food in the Last Year: Often true   Transportation Needs: No Transportation Needs    Lack of Transportation (Medical): No    Lack of Transportation (Non-Medical): No   Physical Activity: Insufficiently Active    Days of Exercise per Week: 1 day    Minutes of Exercise per Session: 10 min   Stress: Stress Concern Present    Feeling of Stress : Very much   Social Connections: Unknown    Frequency of Communication with Friends and Family: Twice a week    Frequency of Social Gatherings with Friends and Family: Once a week    Active Member of Clubs or Organizations: No    Attends Club or Organization Meetings: Never    Marital Status:    Housing Stability: Low Risk     Unable to Pay for Housing in the Last Year: No    Number of Places Lived in the Last Year: 1    Unstable Housing in the Last Year: No     Past Surgical History:   Procedure Laterality Date    FISTULA REPAIR      LEFT HEART CATHETERIZATION N/A 10/23/2018    Procedure: Left heart cath;  Surgeon: Niharika Squires MD;  [facility];  Service: Cardiology;  Laterality: N/A;     Family History   Problem Relation Age of Onset    Heart failure Mother     Cancer Father     Heart disease Brother     Heart attack Brother     Cancer Brother     Heart disease Brother     Drug abuse Brother        Review of patient's allergies indicates:   Allergen Reactions    Oyster extract Swelling     PT swells in his throat after eating oysters on occasion       Current Outpatient Medications:     albuterol (PROVENTIL/VENTOLIN HFA) 90 mcg/actuation inhaler, Inhale 1-2 puffs into the lungs every 4 (four) hours as needed for Shortness of  Breath (coughing). Rescue, Disp: 18 g, Rfl: 11    albuterol-ipratropium (DUO-NEB) 2.5 mg-0.5 mg/3 mL nebulizer solution, USE 1 AMPULE IN NEBULIZER EVERY 4 HOURS AS NEEDED FOR WHEEZING, Disp: 180 mL, Rfl: 0    alfuzosin (UROXATRAL) 10 mg Tb24, TAKE 1 TABLET BY MOUTH ONCE DAILY AFTER BREAKFAST, Disp: , Rfl:     ALPRAZolam (XANAX) 0.5 MG tablet, Take 0.5 mg by mouth 3 (three) times daily as needed for Anxiety., Disp: , Rfl:     aspirin (ECOTRIN) 81 MG EC tablet, , Disp: , Rfl:     azelastine (ASTELIN) 137 mcg (0.1 %) nasal spray, 1 spray (137 mcg total) by Nasal route 2 (two) times daily., Disp: 30 mL, Rfl: 2    beclomethasone (QVAR) 80 mcg/actuation Aero, Inhale 2 puffs into the lungs 2 (two) times a day. Controller, Disp: 8 g, Rfl: 11    ELIQUIS 5 mg Tab, Take 5 mg by mouth 2 (two) times daily., Disp: , Rfl:     EScitalopram oxalate (LEXAPRO) 20 MG tablet, Take 0.5 tablets (10 mg total) by mouth once daily., Disp: 90 tablet, Rfl: 1    esomeprazole (NEXIUM) 40 MG capsule, Take 40 mg by mouth once daily., Disp: , Rfl:     ezetimibe (ZETIA) 10 mg tablet, Take 10 mg by mouth once daily., Disp: , Rfl:     finasteride (PROSCAR) 5 mg tablet, Take 5 mg by mouth once daily., Disp: , Rfl:     LIVALO 4 mg Tab, Take 1 tablet by mouth once daily., Disp: , Rfl:     metoprolol tartrate (LOPRESSOR) 25 MG tablet, Take 12.5 mg by mouth 2 (two) times daily., Disp: , Rfl:     nicotine (NICODERM CQ) 21 mg/24 hr, Place 1 patch onto the skin once daily., Disp: 28 patch, Rfl: 0    nitroGLYCERIN (NITROSTAT) 0.4 MG SL tablet, Place under the tongue., Disp: , Rfl:     umeclidinium-vilanteroL (ANORO ELLIPTA) 62.5-25 mcg/actuation DsDv, INHALE 1 PUFF BY MOUTH ONCE DAILY (CONTROLLER), Disp: 180 each, Rfl: 3    valACYclovir (VALTREX) 1000 MG tablet, Take 1 g by mouth 2 (two) times daily., Disp: , Rfl:     Review of Systems   Constitutional: Negative for appetite change, fatigue, fever and unexpected weight change.   Respiratory:  "Positive for cough. Negative for chest tightness, shortness of breath and wheezing.    Cardiovascular: Negative for chest pain and leg swelling.   Gastrointestinal: Negative for abdominal pain, constipation, nausea and vomiting.        -heartburn   Genitourinary: Negative for decreased urine volume, difficulty urinating, dysuria and frequency.   Musculoskeletal: Positive for back pain. Negative for arthralgias, myalgias and neck pain.   Skin: Negative for rash.   Neurological: Negative for dizziness, weakness, numbness and headaches.   Hematological: Does not bruise/bleed easily.   Psychiatric/Behavioral: Negative for dysphoric mood, sleep disturbance and suicidal ideas. The patient is not nervous/anxious.    All other systems reviewed and are negative.         Objective:      Vitals:    03/14/22 1444   BP: 132/84   Pulse: (!) 56   SpO2: 97%   Weight: 83.5 kg (184 lb)   Height: 5' 6" (1.676 m)     Wt Readings from Last 3 Encounters:   03/14/22 83.5 kg (184 lb)   03/10/22 84 kg (185 lb 3 oz)   02/04/22 81.2 kg (179 lb 0.2 oz)       Physical Exam  Vitals reviewed.   Constitutional:       General: He is not in acute distress.     Appearance: Normal appearance. He is well-developed and overweight.   HENT:      Head: Normocephalic and atraumatic.   Neck:      Thyroid: No thyromegaly.   Cardiovascular:      Rate and Rhythm: Normal rate and regular rhythm.      Heart sounds: Normal heart sounds. No murmur heard.    No friction rub.   Pulmonary:      Effort: Pulmonary effort is normal.      Breath sounds: Normal breath sounds. No wheezing or rales.   Abdominal:      General: Bowel sounds are normal. There is no distension.      Palpations: Abdomen is soft.      Tenderness: There is abdominal tenderness in the right upper quadrant.   Musculoskeletal:      Cervical back: Neck supple.   Lymphadenopathy:      Cervical: No cervical adenopathy.   Skin:     General: Skin is warm and dry.      Findings: No rash.   Neurological:     "  Mental Status: He is alert and oriented to person, place, and time.   Psychiatric:         Attention and Perception: He is attentive.         Speech: Speech normal.         Behavior: Behavior normal.         Thought Content: Thought content normal.         Judgment: Judgment normal.           Assessment:       1. Essential hypertension    2. Atrial fibrillation, unspecified type    3. Gastroesophageal reflux disease without esophagitis    4. Generalized anxiety disorder    5. GLENNA on CPAP    6. Tobacco dependence         Plan:       Essential hypertension  Comments:  Controlled. Will continue to monitor BP on current medication regimen    Atrial fibrillation, unspecified type  Comments:  Rate controlled. To continue Eliquis. Will refer to Cards.  Orders:  -     Ambulatory referral/consult to Cardiology; Future; Expected date: 03/21/2022    Gastroesophageal reflux disease without esophagitis  Comments:  Controlled. Will continue to monitor symptoms.    Generalized anxiety disorder  Comments:  Controlled. Will continue to monitor symptoms  Orders:  -     EScitalopram oxalate (LEXAPRO) 20 MG tablet; Take 0.5 tablets (10 mg total) by mouth once daily.  Dispense: 90 tablet; Refill: 1    GLENNA on CPAP  Comments:  Pt encouraged to use CPAP in order to help prevent adverse cardiopulmonary disease.    Tobacco dependence  Comments:  Pt encouraged to stop smoking.    Other  Lab results discussed and reviewed with patient.    Follow up in about 6 months (around 9/14/2022) for HTN, GERD, Anxiety, GLENNA, afib.        3/14/2022 Zo Burrell

## 2022-12-23 ENCOUNTER — VIRTUAL VISIT (OUTPATIENT)
Dept: FAMILY MEDICINE | Facility: CLINIC | Age: 52
End: 2022-12-23
Payer: COMMERCIAL

## 2022-12-23 DIAGNOSIS — I10 HYPERTENSION GOAL BP (BLOOD PRESSURE) < 140/90: ICD-10-CM

## 2022-12-23 DIAGNOSIS — Z12.11 SCREEN FOR COLON CANCER: ICD-10-CM

## 2022-12-23 DIAGNOSIS — F11.90 CHRONIC, CONTINUOUS USE OF OPIOIDS: ICD-10-CM

## 2022-12-23 DIAGNOSIS — E11.65 TYPE 2 DIABETES MELLITUS WITH HYPERGLYCEMIA, WITHOUT LONG-TERM CURRENT USE OF INSULIN (H): Primary | ICD-10-CM

## 2022-12-23 DIAGNOSIS — G89.29 CHRONIC PAIN OF RIGHT KNEE: ICD-10-CM

## 2022-12-23 DIAGNOSIS — M25.561 CHRONIC PAIN OF RIGHT KNEE: ICD-10-CM

## 2022-12-23 PROCEDURE — 99214 OFFICE O/P EST MOD 30 MIN: CPT | Mod: GT | Performed by: FAMILY MEDICINE

## 2022-12-23 RX ORDER — GLIMEPIRIDE 4 MG/1
8 TABLET ORAL DAILY
Qty: 180 TABLET | Refills: 0 | Status: SHIPPED | OUTPATIENT
Start: 2022-12-23 | End: 2023-02-14

## 2022-12-23 RX ORDER — CHLORTHALIDONE 25 MG/1
25 TABLET ORAL DAILY
Qty: 90 TABLET | Refills: 0 | Status: SHIPPED | OUTPATIENT
Start: 2022-12-23 | End: 2023-02-14

## 2022-12-23 RX ORDER — AMLODIPINE BESYLATE 10 MG/1
10 TABLET ORAL DAILY
Qty: 90 TABLET | Refills: 0 | Status: SHIPPED | OUTPATIENT
Start: 2022-12-23 | End: 2023-02-14

## 2022-12-23 RX ORDER — LOSARTAN POTASSIUM 50 MG/1
50 TABLET ORAL DAILY
Qty: 90 TABLET | Refills: 0 | Status: SHIPPED | OUTPATIENT
Start: 2022-12-23 | End: 2023-02-14

## 2022-12-23 RX ORDER — OXYCODONE AND ACETAMINOPHEN 7.5; 325 MG/1; MG/1
1-2 TABLET ORAL 3 TIMES DAILY PRN
Qty: 150 TABLET | Refills: 0 | Status: SHIPPED | OUTPATIENT
Start: 2022-12-29 | End: 2023-01-30

## 2022-12-23 RX ORDER — PIOGLITAZONEHYDROCHLORIDE 45 MG/1
45 TABLET ORAL DAILY
Qty: 90 TABLET | Refills: 0 | Status: SHIPPED | OUTPATIENT
Start: 2022-12-23 | End: 2023-02-14

## 2022-12-23 ASSESSMENT — ANXIETY QUESTIONNAIRES
7. FEELING AFRAID AS IF SOMETHING AWFUL MIGHT HAPPEN: NOT AT ALL
IF YOU CHECKED OFF ANY PROBLEMS ON THIS QUESTIONNAIRE, HOW DIFFICULT HAVE THESE PROBLEMS MADE IT FOR YOU TO DO YOUR WORK, TAKE CARE OF THINGS AT HOME, OR GET ALONG WITH OTHER PEOPLE: NOT DIFFICULT AT ALL
GAD7 TOTAL SCORE: 1
4. TROUBLE RELAXING: NOT AT ALL
7. FEELING AFRAID AS IF SOMETHING AWFUL MIGHT HAPPEN: NOT AT ALL
2. NOT BEING ABLE TO STOP OR CONTROL WORRYING: NOT AT ALL
6. BECOMING EASILY ANNOYED OR IRRITABLE: SEVERAL DAYS
1. FEELING NERVOUS, ANXIOUS, OR ON EDGE: NOT AT ALL
8. IF YOU CHECKED OFF ANY PROBLEMS, HOW DIFFICULT HAVE THESE MADE IT FOR YOU TO DO YOUR WORK, TAKE CARE OF THINGS AT HOME, OR GET ALONG WITH OTHER PEOPLE?: NOT DIFFICULT AT ALL
GAD7 TOTAL SCORE: 1
5. BEING SO RESTLESS THAT IT IS HARD TO SIT STILL: NOT AT ALL
3. WORRYING TOO MUCH ABOUT DIFFERENT THINGS: NOT AT ALL

## 2022-12-23 NOTE — PROGRESS NOTES
Kevin is a 52 year old who is being evaluated via a billable video visit.      How would you like to obtain your AVS? MyChart  If the video visit is dropped, the invitation should be resent by: Text to cell phone: [phone]  Will anyone else be joining your video visit? No          Assessment & Plan     Type 2 diabetes mellitus with hyperglycemia, without long-term current use of insulin (H)  A1c well controlled in October.  Due for recheck in 6 months.  - Adult Eye  Referral; Future  - empagliflozin (JARDIANCE) 10 MG TABS tablet; Take 1 tablet (10 mg) by mouth daily  - glimepiride (AMARYL) 4 MG tablet; Take 2 tablets (8 mg) by mouth daily Profile Rx  - metFORMIN (GLUCOPHAGE) 500 MG tablet; Take 2 tablets (1,000 mg) by mouth 2 times daily (with meals) Profile Rx  - pioglitazone (ACTOS) 45 MG tablet; Take 1 tablet (45 mg) by mouth daily Profile Rx  - Lipid panel reflex to direct LDL Fasting; Future  - Comprehensive metabolic panel; Future  - Hemoglobin A1c; Future  - Albumin Random Urine Quantitative with Creat Ratio; Future    Hypertension goal BP (blood pressure) < 140/90  Stable on current regimen.  Continue same plan and routine follow-up.   - amLODIPine (NORVASC) 10 MG tablet; Take 1 tablet (10 mg) by mouth daily Profile Rx  - chlorthalidone (HYGROTON) 25 MG tablet; Take 1 tablet (25 mg) by mouth daily Profile Rx  - losartan (COZAAR) 50 MG tablet; Take 1 tablet (50 mg) by mouth daily Profile Rx    Chronic pain of right knee  Longstanding ongoing issue.  Has had some evidence of cartilage damage in the past.  Would like to revisit the issue this spring and I think setting up another MRI to assess further damage would be warranted and get him back in with orthopedics.  - oxyCODONE-acetaminophen (PERCOCET) 7.5-325 MG per tablet; Take 1-2 tablets by mouth 3 times daily as needed for pain  - Drug Abuse Screen Panel 13, Urine (Pain Care Package) - lab collect; Future    Chronic, continuous use of  opioids  Stable and up-to-date on routine monitoring  - oxyCODONE-acetaminophen (PERCOCET) 7.5-325 MG per tablet; Take 1-2 tablets by mouth 3 times daily as needed for pain    Screen for colon cancer  Has Shabnam and encouraged to return           See Patient Instructions    Return in about 3 months (around 3/23/2023) for Follow up on Pain, Diabetes follow up, In Office or Video, can call for refills.    Lizett Terry MD  [facility] SUN Brown is a 52 year old, presenting for the following health issues:  Diabetes      History of Present Illness       Diabetes:   He presents for follow up of diabetes.  He is checking home blood glucose with a continuous glucose monitor.  He checks blood glucose before and after meals.  Blood glucose is sometimes over 200 and sometimes under 70. He is aware of hypoglycemia symptoms including weakness and other. He has no concerns regarding his diabetes at this time.  He is not experiencing numbness or burning in feet, excessive thirst, blurry vision, weight changes or redness, sores or blisters on feet. The patient has had a diabetic eye exam in the last 12 months. Eye exam performed on 1/21. Location of last eye exam [place] [facility].        He eats 0-1 servings of fruits and vegetables daily.He consumes 1 sweetened beverage(s) daily.He exercises with enough effort to increase his heart rate 9 or less minutes per day.  He exercises with enough effort to increase his heart rate 3 or less days per week. He is missing 1 dose(s) of medications per week.  He is not taking prescribed medications regularly due to remembering to take.  Today's MIKE-7 Score: 1       Video visit with patient in follow-up of diabetes, hypertension, knee pain.  Generally doing well but admits that he has not been eating as well during the holidays.    Review of Systems   Constitutional, HEENT, cardiovascular, pulmonary, gi and gu systems are negative, except as  otherwise noted.      Objective    Vitals - Patient Reported  Pain Score: Moderate Pain (4)  Pain Loc: Knee      Vitals:  No vitals were obtained today due to virtual visit.    Physical Exam   GENERAL: Healthy, alert and no distress  EYES: Eyes grossly normal to inspection.  No discharge or erythema, or obvious scleral/conjunctival abnormalities.  RESP: No audible wheeze, cough, or visible cyanosis.  No visible retractions or increased work of breathing.    SKIN: Visible skin clear. No significant rash, abnormal pigmentation or lesions.  NEURO: Cranial nerves grossly intact.  Mentation and speech appropriate for age.  PSYCH: Mentation appears normal, affect normal/bright, judgement and insight intact, normal speech and appearance well-groomed.    Past labs reviewed with the patient.             Video-Visit Details    Type of service:  Video Visit     Originating Location (pt. Location): Home    Distant Location (provider location):  Off-site  Platform used for Video Visit: [employer]

## 2023-01-15 DIAGNOSIS — E11.65 TYPE 2 DIABETES MELLITUS WITH HYPERGLYCEMIA, WITHOUT LONG-TERM CURRENT USE OF INSULIN (H): ICD-10-CM

## 2023-01-16 RX ORDER — BLOOD SUGAR DIAGNOSTIC
STRIP MISCELLANEOUS
Qty: 100 STRIP | Refills: 6 | Status: SHIPPED | OUTPATIENT
Start: 2023-01-16 | End: 2024-01-22

## 2023-01-30 ENCOUNTER — TELEPHONE (OUTPATIENT)
Dept: FAMILY MEDICINE | Facility: CLINIC | Age: 53
End: 2023-01-30
Payer: COMMERCIAL

## 2023-01-30 DIAGNOSIS — M25.561 CHRONIC PAIN OF RIGHT KNEE: ICD-10-CM

## 2023-01-30 DIAGNOSIS — G89.29 CHRONIC PAIN OF RIGHT KNEE: ICD-10-CM

## 2023-01-30 DIAGNOSIS — F11.90 CHRONIC, CONTINUOUS USE OF OPIOIDS: ICD-10-CM

## 2023-01-30 RX ORDER — OXYCODONE AND ACETAMINOPHEN 7.5; 325 MG/1; MG/1
1-2 TABLET ORAL 3 TIMES DAILY PRN
Qty: 150 TABLET | Refills: 0 | Status: SHIPPED | OUTPATIENT
Start: 2023-01-30 | End: 2023-02-14

## 2023-01-30 NOTE — TELEPHONE ENCOUNTER
Medication Question or Refill    Contacts       Type Contact Phone/Fax    01/30/2023 02:13 PM CST Phone (Incoming) Kevin Mcguire (Self) [phone] (M)          What medication are you calling about (include dose and sig)?: oxyCODONE-acetaminophen (PERCOCET) 7.5-325 MG per tablet    Preferred Pharmacy:   [id number] - [place], [place] - [address] AT [facility] [address]  [facility] [address]  Phone: [phone] Fax: [phone]    Controlled Substance Agreement on file:   CSA -- Patient Level:     [Media Unavailable] Controlled Substance Agreement - Opioid - Scan on 5/24/2022 12:11 PM   [Media Unavailable] Controlled Substance Agreement - Opioid - Scan on 3/20/2021  4:56 PM   [Media Unavailable] Controlled Substance Agreement - Opioid - Scan on 12/19/2018  1:21 PM       Who prescribed the medication?: Mara    Do you need a refill? Yes    When did you use the medication last? 1/29/2023    Patient offered an appointment? No    Do you have any questions or concerns?  No      Could we send this information to you in [facility] or would you prefer to receive a phone call?:   Patient would prefer a phone call   Okay to leave a detailed message?: Yes at Cell number on file:    Telephone Information:   Mobile [phone]

## 2023-02-14 ENCOUNTER — VIRTUAL VISIT (OUTPATIENT)
Dept: FAMILY MEDICINE | Facility: CLINIC | Age: 53
End: 2023-02-14
Payer: COMMERCIAL

## 2023-02-14 ENCOUNTER — LAB (OUTPATIENT)
Dept: FAMILY MEDICINE | Facility: CLINIC | Age: 53
End: 2023-02-14

## 2023-02-14 DIAGNOSIS — E66.01 SEVERE OBESITY (BMI 35.0-39.9) WITH COMORBIDITY (H): ICD-10-CM

## 2023-02-14 DIAGNOSIS — M25.561 CHRONIC PAIN OF RIGHT KNEE: ICD-10-CM

## 2023-02-14 DIAGNOSIS — E11.65 TYPE 2 DIABETES MELLITUS WITH HYPERGLYCEMIA, WITHOUT LONG-TERM CURRENT USE OF INSULIN (H): Primary | ICD-10-CM

## 2023-02-14 DIAGNOSIS — I10 HYPERTENSION GOAL BP (BLOOD PRESSURE) < 140/90: ICD-10-CM

## 2023-02-14 DIAGNOSIS — G89.29 CHRONIC PAIN OF RIGHT KNEE: ICD-10-CM

## 2023-02-14 DIAGNOSIS — F11.90 CHRONIC, CONTINUOUS USE OF OPIOIDS: ICD-10-CM

## 2023-02-14 DIAGNOSIS — Z12.11 SCREEN FOR COLON CANCER: ICD-10-CM

## 2023-02-14 PROCEDURE — 99214 OFFICE O/P EST MOD 30 MIN: CPT | Mod: VID | Performed by: FAMILY MEDICINE

## 2023-02-14 RX ORDER — LOSARTAN POTASSIUM 50 MG/1
50 TABLET ORAL DAILY
Qty: 90 TABLET | Refills: 0 | Status: SHIPPED | OUTPATIENT
Start: 2023-02-14 | End: 2023-06-19

## 2023-02-14 RX ORDER — OXYCODONE AND ACETAMINOPHEN 7.5; 325 MG/1; MG/1
1-2 TABLET ORAL 3 TIMES DAILY PRN
Qty: 150 TABLET | Refills: 0 | Status: SHIPPED | OUTPATIENT
Start: 2023-02-28 | End: 2023-03-03

## 2023-02-14 RX ORDER — CHLORTHALIDONE 25 MG/1
25 TABLET ORAL DAILY
Qty: 90 TABLET | Refills: 0 | Status: SHIPPED | OUTPATIENT
Start: 2023-02-14 | End: 2023-06-19

## 2023-02-14 RX ORDER — GLIMEPIRIDE 4 MG/1
8 TABLET ORAL DAILY
Qty: 180 TABLET | Refills: 0 | Status: SHIPPED | OUTPATIENT
Start: 2023-02-14 | End: 2023-06-19

## 2023-02-14 RX ORDER — AMLODIPINE BESYLATE 10 MG/1
10 TABLET ORAL DAILY
Qty: 90 TABLET | Refills: 0 | Status: SHIPPED | OUTPATIENT
Start: 2023-02-14 | End: 2023-06-19

## 2023-02-14 RX ORDER — PIOGLITAZONEHYDROCHLORIDE 45 MG/1
45 TABLET ORAL DAILY
Qty: 90 TABLET | Refills: 0 | Status: SHIPPED | OUTPATIENT
Start: 2023-02-14 | End: 2023-06-19

## 2023-02-14 ASSESSMENT — ANXIETY QUESTIONNAIRES
6. BECOMING EASILY ANNOYED OR IRRITABLE: NOT AT ALL
7. FEELING AFRAID AS IF SOMETHING AWFUL MIGHT HAPPEN: NOT AT ALL
GAD7 TOTAL SCORE: 0
3. WORRYING TOO MUCH ABOUT DIFFERENT THINGS: NOT AT ALL
GAD7 TOTAL SCORE: 0
2. NOT BEING ABLE TO STOP OR CONTROL WORRYING: NOT AT ALL
5. BEING SO RESTLESS THAT IT IS HARD TO SIT STILL: NOT AT ALL
4. TROUBLE RELAXING: NOT AT ALL
8. IF YOU CHECKED OFF ANY PROBLEMS, HOW DIFFICULT HAVE THESE MADE IT FOR YOU TO DO YOUR WORK, TAKE CARE OF THINGS AT HOME, OR GET ALONG WITH OTHER PEOPLE?: NOT DIFFICULT AT ALL
IF YOU CHECKED OFF ANY PROBLEMS ON THIS QUESTIONNAIRE, HOW DIFFICULT HAVE THESE PROBLEMS MADE IT FOR YOU TO DO YOUR WORK, TAKE CARE OF THINGS AT HOME, OR GET ALONG WITH OTHER PEOPLE: NOT DIFFICULT AT ALL
1. FEELING NERVOUS, ANXIOUS, OR ON EDGE: NOT AT ALL
7. FEELING AFRAID AS IF SOMETHING AWFUL MIGHT HAPPEN: NOT AT ALL

## 2023-02-14 NOTE — PROGRESS NOTES
Kevin is a 52 year old who is being evaluated via a billable video visit.      How would you like to obtain your AVS? MyChart  If the video visit is dropped, the invitation should be resent by: Text to cell phone: [phone]  Will anyone else be joining your video visit? No          Assessment & Plan     Type 2 diabetes mellitus with hyperglycemia, without long-term current use of insulin (H)  Admits that diet and activity have not been that great since the holidays but he has a plan in place to get back on it.  Weight has still been maintained around 213 pounds.  This has been a big change and he is 30 pounds less than he was a few years ago and numbers been much better since then.  We plan a recheck in the spring.  Labs have been ordered.  - empagliflozin (JARDIANCE) 10 MG TABS tablet; Take 1 tablet (10 mg) by mouth daily  - glimepiride (AMARYL) 4 MG tablet; Take 2 tablets (8 mg) by mouth daily Profile Rx  - metFORMIN (GLUCOPHAGE) 500 MG tablet; Take 2 tablets (1,000 mg) by mouth 2 times daily (with meals) Profile Rx  - pioglitazone (ACTOS) 45 MG tablet; Take 1 tablet (45 mg) by mouth daily Profile Rx    Hypertension goal BP (blood pressure) < 140/90  Stable on current regimen.  Continue same plan and routine follow-up.   - amLODIPine (NORVASC) 10 MG tablet; Take 1 tablet (10 mg) by mouth daily Profile Rx  - chlorthalidone (HYGROTON) 25 MG tablet; Take 1 tablet (25 mg) by mouth daily Profile Rx  - losartan (COZAAR) 50 MG tablet; Take 1 tablet (50 mg) by mouth daily Profile Rx    Severe obesity (BMI 35.0-39.9) with comorbidity (H)  Working on diet and exercise to maintain his weight    Chronic pain of right knee  Stable and up-to-date on routine monitoring  - oxyCODONE-acetaminophen (PERCOCET) 7.5-325 MG per tablet; Take 1-2 tablets by mouth 3 times daily as needed for pain    Chronic, continuous use of opioids  As above  - oxyCODONE-acetaminophen (PERCOCET) 7.5-325 MG per tablet; Take 1-2 tablets by mouth 3 times  daily as needed for pain    Screen for colon cancer    - BRENNAN([employer]); Future         See Patient Instructions    Return in about 3 months (around 5/14/2023) for Diabetes follow up, Follow up on Pain, In Office or Video, Previsit labwork.    Lizett Terry MD  [facility] SUN Brown is a 52 year old, presenting for the following health issues:  Diabetes      History of Present Illness       Diabetes:   He presents for follow up of diabetes.  He is checking home blood glucose a few times a week. He checks blood glucose before and after meals.  Blood glucose is never over 200 and sometimes under 70. He is aware of hypoglycemia symptoms including weakness and other. He has no concerns regarding his diabetes at this time.  He is not experiencing numbness or burning in feet, excessive thirst, blurry vision, weight changes or redness, sores or blisters on feet. The patient has not had a diabetic eye exam in the last 12 months.         He eats 2-3 servings of fruits and vegetables daily.He consumes 0 sweetened beverage(s) daily.He exercises with enough effort to increase his heart rate 9 or less minutes per day.  He exercises with enough effort to increase his heart rate 3 or less days per week.   He is taking medications regularly.  Today's MIKE-7 Score: 0         Video visit patient today to follow-up on diabetes, hypertension, chronic knee pain.  Generally doing well overall but admits he has not been as active during the winter.  Has a plan in place.    Review of Systems   Constitutional, HEENT, cardiovascular, pulmonary, gi and gu systems are negative, except as otherwise noted.      Objective           Vitals:  No vitals were obtained today due to virtual visit.    Physical Exam   GENERAL: Healthy, alert and no distress  EYES: Eyes grossly normal to inspection.  No discharge or erythema, or obvious scleral/conjunctival abnormalities.  RESP: No audible wheeze,  cough, or visible cyanosis.  No visible retractions or increased work of breathing.    SKIN: Visible skin clear. No significant rash, abnormal pigmentation or lesions.  NEURO: Cranial nerves grossly intact.  Mentation and speech appropriate for age.  PSYCH: Mentation appears normal, affect normal/bright, judgement and insight intact, normal speech and appearance well-groomed.    Past labs reviewed with the patient.             Video-Visit Details    Type of service:  Video Visit     Originating Location (pt. Location): Home    Distant Location (provider location):  On-site  Platform used for Video Visit: Amor

## 2023-03-03 ENCOUNTER — TELEPHONE (OUTPATIENT)
Dept: FAMILY MEDICINE | Facility: CLINIC | Age: 53
End: 2023-03-03
Payer: COMMERCIAL

## 2023-03-03 DIAGNOSIS — M25.561 CHRONIC PAIN OF RIGHT KNEE: ICD-10-CM

## 2023-03-03 DIAGNOSIS — G89.29 CHRONIC PAIN OF RIGHT KNEE: ICD-10-CM

## 2023-03-03 DIAGNOSIS — F11.90 CHRONIC, CONTINUOUS USE OF OPIOIDS: ICD-10-CM

## 2023-03-03 RX ORDER — OXYCODONE AND ACETAMINOPHEN 7.5; 325 MG/1; MG/1
1-2 TABLET ORAL 3 TIMES DAILY PRN
Qty: 150 TABLET | Refills: 0 | Status: SHIPPED | OUTPATIENT
Start: 2023-03-03 | End: 2023-03-31

## 2023-03-03 NOTE — TELEPHONE ENCOUNTER
Reason for Call:  Medication or medication refill:    Do you use a [facility]?  Name of the pharmacy and phone number for the current request:      Name of the medication requested: oxyCODONE-acetaminophen (PERCOCET) 7.5-325 MG per tablet    Other request: Pt called stating that [facility] did not have enough quantity of medication  so Pt needs it sent to this one pharmacy instead [place],      Can we leave a detailed message on this number? YES    Phone number patient can be reached at: Cell number on file:    Telephone Information:   Mobile [phone]       Best Time:    Call taken on 3/3/2023 at 10:16 AM by Nithya Stuart

## 2023-03-08 NOTE — TELEPHONE ENCOUNTER
How Severe Are Your Spot(S)?: mild Refilled - due for follow up in Feb   What Type Of Note Output Would You Prefer (Optional)?: Bullet Format What Is The Reason For Today's Visit?: Full Body Skin Examination What Is The Reason For Today's Visit? (Being Monitored For X): concerning skin lesions on an annual basis

## 2023-03-12 DIAGNOSIS — E11.65 TYPE 2 DIABETES MELLITUS WITH HYPERGLYCEMIA, WITHOUT LONG-TERM CURRENT USE OF INSULIN (H): ICD-10-CM

## 2023-03-13 RX ORDER — PROCHLORPERAZINE 25 MG/1
SUPPOSITORY RECTAL
Qty: 1 EACH | Refills: 1 | Status: SHIPPED | OUTPATIENT
Start: 2023-03-13 | End: 2024-04-29

## 2023-03-31 DIAGNOSIS — M25.561 CHRONIC PAIN OF RIGHT KNEE: ICD-10-CM

## 2023-03-31 DIAGNOSIS — G89.29 CHRONIC PAIN OF RIGHT KNEE: ICD-10-CM

## 2023-03-31 DIAGNOSIS — F11.90 CHRONIC, CONTINUOUS USE OF OPIOIDS: ICD-10-CM

## 2023-03-31 RX ORDER — OXYCODONE AND ACETAMINOPHEN 7.5; 325 MG/1; MG/1
1-2 TABLET ORAL 3 TIMES DAILY PRN
Qty: 150 TABLET | Refills: 0 | Status: CANCELLED | OUTPATIENT
Start: 2023-03-31

## 2023-03-31 RX ORDER — OXYCODONE AND ACETAMINOPHEN 7.5; 325 MG/1; MG/1
1-2 TABLET ORAL 3 TIMES DAILY PRN
Qty: 150 TABLET | Refills: 0 | Status: SHIPPED | OUTPATIENT
Start: 2023-03-31 | End: 2023-04-28

## 2023-03-31 NOTE — TELEPHONE ENCOUNTER
Medication Question or Refill    Contacts       Type Contact Phone/Fax    03/31/2023 08:26 AM CDT Phone (Incoming) Kevin Mcguire (Self) [phone] (M)          What medication are you calling about (include dose and sig)?: oxyCODONE-acetaminophen (PERCOCET) 7.5-325 MG per tablet    Preferred Pharmacy:  [id number] - [place], [place] - [address] [facility] [address]  [facility] [address]  Phone: [phone] Fax: [phone]      Controlled Substance Agreement on file:   CSA -- Patient Level:     [Media Unavailable] Controlled Substance Agreement - Opioid - Scan on 5/24/2022 12:11 PM   [Media Unavailable] Controlled Substance Agreement - Opioid - Scan on 3/20/2021  4:56 PM   [Media Unavailable] Controlled Substance Agreement - Opioid - Scan on 12/19/2018  1:21 PM       Who prescribed the medication?: Dr Terry    Do you need a refill? Yes    Patient offered an appointment? No    Do you have any questions or concerns?  No      Could we send this information to you in [facility] or would you prefer to receive a phone call?:   Patient would prefer a phone call   Okay to leave a detailed message?: Yes at Cell number on file:    Telephone Information:   Mobile [phone]     Cynthia Delaney MA  [facility]  2nd Floor  Primary Care

## 2023-03-31 NOTE — TELEPHONE ENCOUNTER
PDMP reviewed, pain contract and screening parameters up-to-date.  Appointment in February noted.  Refill sent.    MERLYNK

## 2023-04-23 ENCOUNTER — HEALTH MAINTENANCE LETTER (OUTPATIENT)
Age: 53
End: 2023-04-23

## 2023-04-28 DIAGNOSIS — M25.561 CHRONIC PAIN OF RIGHT KNEE: ICD-10-CM

## 2023-04-28 DIAGNOSIS — G89.29 CHRONIC PAIN OF RIGHT KNEE: ICD-10-CM

## 2023-04-28 DIAGNOSIS — F11.90 CHRONIC, CONTINUOUS USE OF OPIOIDS: ICD-10-CM

## 2023-04-28 RX ORDER — OXYCODONE AND ACETAMINOPHEN 7.5; 325 MG/1; MG/1
1-2 TABLET ORAL 3 TIMES DAILY PRN
Qty: 150 TABLET | Refills: 0 | Status: SHIPPED | OUTPATIENT
Start: 2023-04-28 | End: 2023-05-26

## 2023-04-28 NOTE — TELEPHONE ENCOUNTER
Reason for Call:  Medication or medication refill:    Do you use a [facility]?  Name of the pharmacy and phone number for the current request:      Name of the medication requested: oxyCODONE-acetaminophen (PERCOCET) 7.5-325 MG per tablet    Other request:     Can we leave a detailed message on this number? YES    Phone number patient can be reached at: Cell number on file:    Telephone Information:   Mobile [phone]           Call taken on 4/28/2023 at 12:10 PM by Nithya Stuart

## 2023-05-18 ENCOUNTER — TELEPHONE (OUTPATIENT)
Dept: FAMILY MEDICINE | Facility: CLINIC | Age: 53
End: 2023-05-18
Payer: COMMERCIAL

## 2023-05-18 NOTE — TELEPHONE ENCOUNTER
Reason for Call:  Appointment Request    Patient requesting this type of appt:  OV    Requested provider: Lizett Terry    Reason patient unable to be scheduled: Not within requested timeframe    When does patient want to be seen/preferred time: 1-2 weeks    Comments: Diabetes check and knee pain, willing to do VV but thinks the clinic said it has to be in person.     Could we send this information to you in [facility] or would you prefer to receive a phone call?:   Patient would prefer a phone call   Okay to leave a detailed message?: Yes at Cell number on file:    Telephone Information:   Mobile [phone]       Call taken on 5/18/2023 at 11:38 AM by Simran Iniguez

## 2023-05-18 NOTE — TELEPHONE ENCOUNTER
"As always, if I have an appropriate spot to use, go ahead and book it.  But my schedule is always pretty full, and not likely to change anytime soon.  Patients unfortunately need to start planning a few months ahead.  I do not have the space to \"squeeze people in.\"  So if need be he can use a virtual appointment for the routine follow-up but at some point he does need to be in clinic so we can monitor blood pressure, etc.    And remind him that he has not done his screening for colon cancer.  I know Cologuard kit was sent to him last fall.  There is no blood test to check for colon cancer so the only way is to go through either colonoscopy or something like Cologuard.  Not doing any type of screening is a bad idea.  "

## 2023-05-24 ENCOUNTER — TELEPHONE (OUTPATIENT)
Dept: FAMILY MEDICINE | Facility: CLINIC | Age: 53
End: 2023-05-24
Payer: COMMERCIAL

## 2023-05-24 NOTE — TELEPHONE ENCOUNTER
Patient Quality Outreach    Patient is due for the following:   Diabetes -  Eye Exam  Colon Cancer Screening  Physical Preventive Adult Physical      Topic Date Due     Pneumococcal Vaccine (2 - PCV) 12/17/2014     Zoster (Shingles) Vaccine (1 of 2) Never done     Chronic Opioid Use -  Treatment Agreement (CSA)    Next Steps:   Schedule a Adult Preventative    Type of outreach:    Sent [employer] message.      Questions for provider review:    None           Judy Bolden MA

## 2023-05-26 ENCOUNTER — TELEPHONE (OUTPATIENT)
Dept: FAMILY MEDICINE | Facility: CLINIC | Age: 53
End: 2023-05-26
Payer: COMMERCIAL

## 2023-05-26 DIAGNOSIS — M25.561 CHRONIC PAIN OF RIGHT KNEE: ICD-10-CM

## 2023-05-26 DIAGNOSIS — G89.29 CHRONIC PAIN OF RIGHT KNEE: ICD-10-CM

## 2023-05-26 DIAGNOSIS — F11.90 CHRONIC, CONTINUOUS USE OF OPIOIDS: ICD-10-CM

## 2023-05-26 RX ORDER — OXYCODONE AND ACETAMINOPHEN 7.5; 325 MG/1; MG/1
1-2 TABLET ORAL 3 TIMES DAILY PRN
Qty: 150 TABLET | Refills: 0 | Status: SHIPPED | OUTPATIENT
Start: 2023-05-26 | End: 2023-06-30

## 2023-05-26 NOTE — TELEPHONE ENCOUNTER
Pt retured call to clinic did not want to see another provider will keep his apt but will be out of medications by the 19th. Patient conncected with the TC for possible refill on his medications.

## 2023-05-26 NOTE — TELEPHONE ENCOUNTER
Reason for Call:  Appointment Request    Patient requesting this type of appt:  Hospital/ED Follow-Up     Requested provider: Lizett Terry    Reason patient unable to be scheduled: Not within requested timeframe    When does patient want to be seen/preferred time: 3-7 days    Comments: Patient has appt on June 19th, but would like to be seen sooner, if possible.    Could we send this information to you in [facility] or would you prefer to receive a phone call?:   Patient would prefer a phone call   Okay to leave a detailed message?: Yes at Cell number on file:    Telephone Information:   Mobile [phone]       Call taken on 5/26/2023 at 11:52 AM by Art Muniz

## 2023-05-26 NOTE — TELEPHONE ENCOUNTER
Pt called needs refill on medication oxyCODONE-acetaminophen (PERCOCET) 7.5-325 MG per tablet Pt has enough to get through until tuesday. Pt's pharmacy preferred pharmacy is [facility].      Nithya Travis Facilitator

## 2023-05-26 NOTE — TELEPHONE ENCOUNTER
I am out of the office all next week so I cannot make that timeframe.  Hospital follow-up probably has to be with another provider and then he and I can follow-up in a couple of weeks

## 2023-05-30 NOTE — TELEPHONE ENCOUNTER
SELINA 05/30/2023 at 1:46 pm requesting call back or message in my chart regarding which medications he would need refilled by his appointment on 6/19.   Irina King MA

## 2023-06-02 ENCOUNTER — HEALTH MAINTENANCE LETTER (OUTPATIENT)
Age: 53
End: 2023-06-02

## 2023-06-19 ENCOUNTER — VIRTUAL VISIT (OUTPATIENT)
Dept: FAMILY MEDICINE | Facility: CLINIC | Age: 53
End: 2023-06-19
Payer: COMMERCIAL

## 2023-06-19 ENCOUNTER — LAB (OUTPATIENT)
Dept: FAMILY MEDICINE | Facility: CLINIC | Age: 53
End: 2023-06-19

## 2023-06-19 DIAGNOSIS — E66.01 SEVERE OBESITY (BMI 35.0-39.9) WITH COMORBIDITY (H): ICD-10-CM

## 2023-06-19 DIAGNOSIS — G89.29 CHRONIC PAIN OF RIGHT KNEE: ICD-10-CM

## 2023-06-19 DIAGNOSIS — R51.9 BAD HEADACHE: ICD-10-CM

## 2023-06-19 DIAGNOSIS — Z12.11 SCREEN FOR COLON CANCER: ICD-10-CM

## 2023-06-19 DIAGNOSIS — I10 HYPERTENSION GOAL BP (BLOOD PRESSURE) < 140/90: ICD-10-CM

## 2023-06-19 DIAGNOSIS — E11.65 TYPE 2 DIABETES MELLITUS WITH HYPERGLYCEMIA, WITHOUT LONG-TERM CURRENT USE OF INSULIN (H): Primary | ICD-10-CM

## 2023-06-19 DIAGNOSIS — F11.90 CHRONIC, CONTINUOUS USE OF OPIOIDS: ICD-10-CM

## 2023-06-19 DIAGNOSIS — M25.561 CHRONIC PAIN OF RIGHT KNEE: ICD-10-CM

## 2023-06-19 PROCEDURE — 99214 OFFICE O/P EST MOD 30 MIN: CPT | Mod: VID | Performed by: FAMILY MEDICINE

## 2023-06-19 RX ORDER — PIOGLITAZONEHYDROCHLORIDE 45 MG/1
45 TABLET ORAL DAILY
Qty: 90 TABLET | Refills: 1 | Status: SHIPPED | OUTPATIENT
Start: 2023-06-19 | End: 2023-11-14

## 2023-06-19 RX ORDER — AMLODIPINE BESYLATE 10 MG/1
10 TABLET ORAL DAILY
Qty: 90 TABLET | Refills: 1 | Status: SHIPPED | OUTPATIENT
Start: 2023-06-19 | End: 2023-11-14

## 2023-06-19 RX ORDER — GLIMEPIRIDE 4 MG/1
8 TABLET ORAL DAILY
Qty: 180 TABLET | Refills: 1 | Status: SHIPPED | OUTPATIENT
Start: 2023-06-19 | End: 2023-11-14

## 2023-06-19 RX ORDER — CHLORTHALIDONE 25 MG/1
25 TABLET ORAL DAILY
Qty: 90 TABLET | Refills: 1 | Status: SHIPPED | OUTPATIENT
Start: 2023-06-19 | End: 2023-11-14

## 2023-06-19 RX ORDER — LOSARTAN POTASSIUM 50 MG/1
50 TABLET ORAL DAILY
Qty: 90 TABLET | Refills: 1 | Status: SHIPPED | OUTPATIENT
Start: 2023-06-19 | End: 2023-11-14

## 2023-06-19 ASSESSMENT — ANXIETY QUESTIONNAIRES
GAD7 TOTAL SCORE: 1
GAD7 TOTAL SCORE: 1
6. BECOMING EASILY ANNOYED OR IRRITABLE: SEVERAL DAYS
5. BEING SO RESTLESS THAT IT IS HARD TO SIT STILL: NOT AT ALL
4. TROUBLE RELAXING: NOT AT ALL
GAD7 TOTAL SCORE: 1
3. WORRYING TOO MUCH ABOUT DIFFERENT THINGS: NOT AT ALL
2. NOT BEING ABLE TO STOP OR CONTROL WORRYING: NOT AT ALL
7. FEELING AFRAID AS IF SOMETHING AWFUL MIGHT HAPPEN: NOT AT ALL
7. FEELING AFRAID AS IF SOMETHING AWFUL MIGHT HAPPEN: NOT AT ALL
1. FEELING NERVOUS, ANXIOUS, OR ON EDGE: NOT AT ALL

## 2023-06-19 NOTE — PROGRESS NOTES
Kevin is a 53 year old who is being evaluated via a billable video visit.      How would you like to obtain your AVS? MyChart  If the video visit is dropped, the invitation should be resent by: Send to e-mail at: [email or web address]  Will anyone else be joining your video visit? No          Assessment & Plan     Type 2 diabetes mellitus with hyperglycemia, without long-term current use of insulin (H)  Most recent A1c was less than 8 but patient admits her diet has not been great so would not surprise me if it is back above goal.  This has been his usual pattern, very effort driven.  So we had a good talk about diet and exercise and weight loss.  - Adult Eye  Referral; Future  - empagliflozin (JARDIANCE) 10 MG TABS tablet; Take 1 tablet (10 mg) by mouth daily  - glimepiride (AMARYL) 4 MG tablet; Take 2 tablets (8 mg) by mouth daily Profile Rx  - metFORMIN (GLUCOPHAGE) 500 MG tablet; Take 2 tablets (1,000 mg) by mouth 2 times daily (with meals) Profile Rx  - pioglitazone (ACTOS) 45 MG tablet; Take 1 tablet (45 mg) by mouth daily Profile Rx    Hypertension goal BP (blood pressure) < 140/90  Continue same therapy.  Would like an in person follow-up for next visit so we can follow blood pressure.  - amLODIPine (NORVASC) 10 MG tablet; Take 1 tablet (10 mg) by mouth daily Profile Rx  - chlorthalidone (HYGROTON) 25 MG tablet; Take 1 tablet (25 mg) by mouth daily Profile Rx  - losartan (COZAAR) 50 MG tablet; Take 1 tablet (50 mg) by mouth daily Profile Rx    Severe obesity (BMI 35.0-39.9) with comorbidity (H)  Continue to work on weight loss    Chronic pain of right knee  Stable, no change.    Chronic, continuous use of opioids  Due for controlled substance agreement and we will recheck urine drug screen with lab work.    Bad headache  Stable on current regimen.  Continue same plan and routine follow-up.   - nabumetone (RELAFEN) 750 MG tablet; Take 1 tablet (750 mg) by mouth 2 times daily as needed for  "pain    Screen for colon cancer    - BRENNAN([employer]); Future         BMI:   Estimated body mass index is 35.28 kg/m  as calculated from the following:    Height as of 10/5/22: 1.651 m (5' 5\").    Weight as of 10/5/22: 96.2 kg (212 lb).   Weight management plan: Discussed healthy diet and exercise guidelines    See Patient Instructions    Lizett Terry MD  [facility] SUN Brown is a 53 year old, presenting for the following health issues:  Diabetes        6/19/2023    12:36 PM   Additional Questions   Roomed by Valentine     History of Present Illness       Diabetes:   He presents for follow up of diabetes.  He is checking home blood glucose with a continuous glucose monitor.  He checks blood glucose before and after meals.  Blood glucose is sometimes over 200 and sometimes under 70. He is aware of hypoglycemia symptoms including dizziness, lethargy and other. He has no concerns regarding his diabetes at this time.  He is having weight gain. The patient has had a diabetic eye exam in the last 12 months. Eye exam performed on unknown. Location of last eye exam [facility].        He eats 0-1 servings of fruits and vegetables daily.He consumes 0 sweetened beverage(s) daily.He exercises with enough effort to increase his heart rate 9 or less minutes per day.  He exercises with enough effort to increase his heart rate 3 or less days per week.   He is taking medications regularly.  Today's MIKE-7 Score: 1       Video visit today in follow-up of diabetes, hypertension, weight.  Doing okay.  Really no change.      Review of Systems   Constitutional, HEENT, cardiovascular, pulmonary, gi and gu systems are negative, except as otherwise noted.      Objective    Vitals - Patient Reported  Pain Score: Moderate Pain (4)  Pain Loc: Knee      Vitals:  No vitals were obtained today due to virtual visit.    Physical Exam   GENERAL: Healthy, alert and no distress  EYES: Eyes grossly " normal to inspection.  No discharge or erythema, or obvious scleral/conjunctival abnormalities.  RESP: No audible wheeze, cough, or visible cyanosis.  No visible retractions or increased work of breathing.    SKIN: Visible skin clear. No significant rash, abnormal pigmentation or lesions.  NEURO: Cranial nerves grossly intact.  Mentation and speech appropriate for age.  PSYCH: Mentation appears normal, affect normal/bright, judgement and insight intact, normal speech and appearance well-groomed.    Past labs reviewed with the patient.             Video-Visit Details    Type of service:  Video Visit     Originating Location (pt. Location): Home    Distant Location (provider location):  Off-site  Platform used for Video Visit: Amor

## 2023-06-19 NOTE — LETTER
Opioid / Opioid Plus Controlled Substance Agreement    This is an agreement between you and your provider about the safe and appropriate use of controlled substance/opioids prescribed by your care team. Controlled substances are medicines that can cause physical and mental dependence (abuse).    There are strict laws about having and using these medicines. We here at [facility] are committing to working with you in your efforts to get better. To support you in this work, we ll help you schedule regular office appointments for medicine refills. If we must cancel or change your appointment for any reason, we ll make sure you have enough medicine to last until your next appointment.     As a Provider, I will:  Listen carefully to your concerns and treat you with respect.   Recommend a treatment plan that I believe is in your best interest. This plan may involve therapies other than opioid pain medication.   Talk with you often about the possible benefits, and the risk of harm of any medicine that we prescribe for you.   Provide a plan on how to taper (discontinue or go off) using this medicine if the decision is made to stop its use.    As a Patient, I understand that opioid(s):   Are a controlled substance prescribed by my care team to help me function or work and manage my condition(s).   Are strong medicines and can cause serious side effects such as:  Drowsiness, which can seriously affect my driving ability  A lower breathing rate, enough to cause death  Harm to my thinking ability   Depression   Abuse of and addiction to this medicine  Need to be taken exactly as prescribed. Combining opioids with certain medicines or chemicals (such as illegal drugs, sedatives, sleeping pills, and benzodiazepines) can be dangerous or even fatal. If I stop opioids suddenly, I may have severe withdrawal symptoms.  Do not work for all types of pain nor for all patients. If they re not helpful, I may be asked to stop  them.        The risks, benefits and side effects of these medicine(s) were explained to me. I agree that:  I will take part in other treatments as advised by my care team. This may be psychiatry or counseling, physical therapy, behavioral therapy, group treatment or a referral to a specialist.     I will keep all my appointments. I understand that this is part of the monitoring of opioids. My care team may require an office visit for EVERY opioid/controlled substance refill. If I miss appointments or don t follow instructions, my care team may stop my medicine.    I will take my medicines as prescribed. I will not change the dose or schedule unless my care team tells me to. There will be no refills if I run out early.     I may be asked to come to the clinic and complete a urine drug test or complete a pill count at any time. If I don t give a urine sample or participate in a pill count, the care team may stop my medicine.    I will only receive prescriptions from this clinic for chronic pain. If I am treated by another provider for acute pain issues, I will tell them that I am taking opioid pain medication for chronic pain and that I have a treatment agreement with this provider. I will inform my [facility] care team within one business day if I am given a prescription for any pain medication by another healthcare provider. My [facility] care team can contact other providers and pharmacists about my use of any medicines.    It is up to me to make sure that I don t run out of my medicines on weekends or holidays. If my care team is willing to refill my opioid prescription without a visit, I must request refills only during office hours. Refills may take up to 3 business days to process. I will use one pharmacy to fill all my opioid and other controlled substance prescriptions. I will notify the clinic about any changes to my insurance or medication availability.    I am responsible for my  prescriptions. If the medicine/prescription is lost, stolen or destroyed, it will not be replaced. I also agree not to share controlled substance medicines with anyone.    I am aware I should not use any illegal or recreational drugs. I agree not to drink alcohol unless my care team says I can.       If I enroll in the [place] [facility] Cannabis program, I will tell my care team prior to my next refill.     I will tell my care team right away if I become pregnant, have a new medical problem treated outside of my regular clinic, or have a change in my medications.    I understand that this medicine can affect my thinking, judgment and reaction time. Alcohol and drugs affect the brain and body, which can affect the safety of my driving. Being under the influence of alcohol or drugs can affect my decision-making, behaviors, personal safety, and the safety of others. Driving while impaired (DWI) can occur if a person is driving, operating, or in physical control of a car, motorcycle, boat, snowmobile, ATV, motorbike, off-road vehicle, or any other motor vehicle ([place] Statute 169A.20). I understand the risk if I choose to drive or operate any vehicle or machinery.    I understand that if I do not follow any of the conditions above, my prescriptions or treatment may be stopped or changed.          Opioids  What You Need to Know    What are opioids?   Opioids are pain medicines that must be prescribed by a doctor. They are also known as narcotics.     Examples are:   morphine (MS Contin, Randi)  oxycodone (Oxycontin)  oxycodone and acetaminophen (Percocet)  hydrocodone and acetaminophen (Vicodin, Norco)   fentanyl patch (Duragesic)   hydromorphone (Dilaudid)   methadone  codeine (Tylenol #3)     What do opioids do well?   Opioids are best for severe short-term pain such as after a surgery or injury. They may work well for cancer pain. They may help some people with long-lasting (chronic) pain.     What do opioids NOT do  well?   Opioids never get rid of pain entirely, and they don t work well for most patients with chronic pain. Opioids don t reduce swelling, one of the causes of pain.                                    Other ways to manage chronic pain and improve function include:     Treat the health problem that may be causing pain  Anti-inflammation medicines, which reduce swelling and tenderness, such as ibuprofen (Advil, Motrin) or naproxen (Aleve)  Acetaminophen (Tylenol)  Antidepressants and anti-seizure medicines, especially for nerve pain  Topical treatments such as patches or creams  Injections or nerve blocks  Chiropractic or osteopathic treatment  Acupuncture, massage, deep breathing, meditation, visual imagery, aromatherapy  Use heat or ice at the pain site  Physical therapy   Exercise  Stop smoking  Take part in therapy       Risks and side effects     Talk to your doctor before you start or decide to keep taking opioids. Possible side effects include:    Lowering your breathing rate enough to cause death  Overdose, including death, especially if taking higher than prescribed doses  Worse depression symptoms; less pleasure in things you usually enjoy  Feeling tired or sluggish  Slower thoughts or cloudy thinking  Being more sensitive to pain over time; pain is harder to control  Trouble sleeping or restless sleep  Changes in hormone levels (for example, less testosterone)  Changes in sex drive or ability to have sex  Constipation  Unsafe driving  Itching and sweating  Dizziness  Nausea, throwing up and dry mouth    What else should I know about opioids?    Opioids may lead to dependence, tolerance, or addiction.    Dependence means that if you stop or reduce the medicine too quickly, you will have withdrawal symptoms. These include loose poop (diarrhea), jitters, flu-like symptoms, nervousness and tremors. Dependence is not the same as addiction.                     Tolerance means needing higher doses over time to  get the same effect. This may increase the chance of serious side effects.    Addiction is when people improperly use a substance that harms their body, their mind or their relations with others. Use of opiates can cause a relapse of addiction if you have a history of drug or alcohol abuse.    People who have used opioids for a long time may have a lower quality of life, worse depression, higher levels of pain and more visits to doctors.    You can overdose on opioids. Take these steps to lower your risk of overdose:    Recognize the signs:  Signs of overdose include decrease or loss of consciousness (blackout), slowed breathing, trouble waking up and blue lips. If someone is worried about overdose, they should call 911.    Talk to your doctor about Narcan (naloxone).   If you are at risk for overdose, you may be given a prescription for Narcan. This medicine very quickly reverses the effects of opioids.   If you overdose, a friend or family member can give you Narcan while waiting for the ambulance. They need to know the signs of overdose and how to give Narcan.     Don't use alcohol or street drugs.   Taking them with opioids can cause death.    Do not take any of these medicines unless your doctor says it s OK. Taking these with opioids can cause death:  Benzodiazepines, such as lorazepam (Ativan), alprazolam (Xanax) or diazepam (Valium)  Muscle relaxers, such as cyclobenzaprine (Flexeril)  Sleeping pills like zolpidem (Ambien)   Other opioids      How to keep you and other people safe while taking opioids:    Never share your opioids with others.  Opioid medicines are regulated by the Drug Enforcement Agency ([place]). Selling or sharing medications is a criminal act.    2. Be sure to store opioids in a secure place, locked up if possible. Young children can easily swallow them and overdose.    3. When you are traveling with your medicines, keep them in the original bottles. If you use a pill box, be sure you also  carry a copy of your medicine list from your clinic or pharmacy.    4. Safe disposal of opioids    Most pharmacies have places to get rid of medicine, called disposal kiosks. Medicine disposal options are also available in every [facility]. Search your county and  medication disposal  to find more options. You can find more details at:  [email or web address]-green/[email or web address]-[email or web address]-medications     I agree that my provider, clinic care team, and pharmacy may work with any city, state or federal law enforcement agency that investigates the misuse, sale, or other diversion of my controlled medicine. I will allow my provider to discuss my care with, or share a copy of, this agreement with any other treating provider, pharmacy or emergency room where I receive care.    I have read this agreement and have asked questions about anything I did not understand.    _______________________________________________________  Patient Signature - Xavier Mcguire _____________________                   Date     _______________________________________________________  Provider Signature - Lizett Terry MD   _____________________                   Date     _______________________________________________________  Witness Signature (required if provider not present while patient signing)   _____________________                   Date

## 2023-06-28 ENCOUNTER — LAB (OUTPATIENT)
Dept: LAB | Facility: CLINIC | Age: 53
End: 2023-06-28
Payer: COMMERCIAL

## 2023-06-28 DIAGNOSIS — E11.65 TYPE 2 DIABETES MELLITUS WITH HYPERGLYCEMIA, WITHOUT LONG-TERM CURRENT USE OF INSULIN (H): ICD-10-CM

## 2023-06-28 DIAGNOSIS — M25.561 CHRONIC PAIN OF RIGHT KNEE: ICD-10-CM

## 2023-06-28 DIAGNOSIS — G89.29 CHRONIC PAIN OF RIGHT KNEE: ICD-10-CM

## 2023-06-28 LAB
AMPHETAMINES UR QL: NOT DETECTED
BARBITURATES UR QL SCN: NOT DETECTED
BENZODIAZ UR QL SCN: NOT DETECTED
BUPRENORPHINE UR QL: NOT DETECTED
CANNABINOIDS UR QL: NOT DETECTED
CHOLEST SERPL-MCNC: 141 MG/DL
COCAINE UR QL SCN: NOT DETECTED
CREAT UR-MCNC: 251 MG/DL
D-METHAMPHET UR QL: NOT DETECTED
HBA1C MFR BLD: 7 % (ref 0–5.6)
HDLC SERPL-MCNC: 46 MG/DL
LDLC SERPL CALC-MCNC: 70 MG/DL
METHADONE UR QL SCN: NOT DETECTED
MICROALBUMIN UR-MCNC: <12 MG/L
MICROALBUMIN/CREAT UR: NORMAL MG/G{CREAT}
NONHDLC SERPL-MCNC: 95 MG/DL
OPIATES UR QL SCN: NOT DETECTED
OXYCODONE UR QL SCN: DETECTED
PCP UR QL SCN: NOT DETECTED
PROPOXYPH UR QL: NOT DETECTED
TRICYCLICS UR QL SCN: NOT DETECTED
TRIGL SERPL-MCNC: 123 MG/DL

## 2023-06-28 PROCEDURE — 80306 DRUG TEST PRSMV INSTRMNT: CPT

## 2023-06-28 PROCEDURE — 82043 UR ALBUMIN QUANTITATIVE: CPT

## 2023-06-28 PROCEDURE — 36415 COLL VENOUS BLD VENIPUNCTURE: CPT

## 2023-06-28 PROCEDURE — 83036 HEMOGLOBIN GLYCOSYLATED A1C: CPT

## 2023-06-28 PROCEDURE — 80061 LIPID PANEL: CPT

## 2023-06-28 PROCEDURE — 82570 ASSAY OF URINE CREATININE: CPT | Mod: 59

## 2023-06-30 DIAGNOSIS — G89.29 CHRONIC PAIN OF RIGHT KNEE: ICD-10-CM

## 2023-06-30 DIAGNOSIS — M25.561 CHRONIC PAIN OF RIGHT KNEE: ICD-10-CM

## 2023-06-30 DIAGNOSIS — F11.90 CHRONIC, CONTINUOUS USE OF OPIOIDS: ICD-10-CM

## 2023-06-30 RX ORDER — OXYCODONE AND ACETAMINOPHEN 7.5; 325 MG/1; MG/1
1-2 TABLET ORAL 3 TIMES DAILY PRN
Qty: 150 TABLET | Refills: 0 | Status: SHIPPED | OUTPATIENT
Start: 2023-06-30 | End: 2023-08-01

## 2023-06-30 NOTE — TELEPHONE ENCOUNTER
Medication Question or Refill        What medication are you calling about (include dose and sig)?: oxyCODONE-acetaminophen (PERCOCET) 7.5-325 MG per tablet    Preferred Pharmacy:  [id number] - [place] - [address] AT [facility] [address]  [place] [address]  Phone: [phone] Fax: [phone]        Controlled Substance Agreement on file:   CSA -- Patient Level:     [Media Unavailable] Controlled Substance Agreement - Opioid - Scan on 5/24/2022 12:11 PM   [Media Unavailable] Controlled Substance Agreement - Opioid - Scan on 3/20/2021  4:56 PM   [Media Unavailable] Controlled Substance Agreement - Opioid - Scan on 12/19/2018  1:21 PM       Who prescribed the medication?: Dr. Terry    Do you need a refill? Yes

## 2023-06-30 NOTE — RESULT ENCOUNTER NOTE
Kevin Santiago,  That lab work looks great!.  Your hemoglobin A1c is doing fantastic.  So what ever you are doing, keep doing it.  STEW Terry M.D.

## 2023-07-31 ENCOUNTER — TELEPHONE (OUTPATIENT)
Dept: FAMILY MEDICINE | Facility: CLINIC | Age: 53
End: 2023-07-31
Payer: COMMERCIAL

## 2023-07-31 DIAGNOSIS — G89.29 CHRONIC PAIN OF RIGHT KNEE: ICD-10-CM

## 2023-07-31 DIAGNOSIS — M25.561 CHRONIC PAIN OF RIGHT KNEE: ICD-10-CM

## 2023-07-31 DIAGNOSIS — F11.90 CHRONIC, CONTINUOUS USE OF OPIOIDS: ICD-10-CM

## 2023-07-31 NOTE — TELEPHONE ENCOUNTER
Medication Question or Refill        What medication are you calling about (include dose and sig)?: oxyCODONE-acetaminophen (PERCOCET) 7.5-325 MG per tablet     Preferred Pharmacy:[id number] - [place] - [address] AT [facility] [address]  [place] [address]  Phone: [phone] Fax: [phone]      Controlled Substance Agreement on file:   CSA -- Patient Level:     [Media Unavailable] Controlled Substance Agreement - Opioid - Scan on 5/24/2022 12:11 PM   [Media Unavailable] Controlled Substance Agreement - Opioid - Scan on 3/20/2021  4:56 PM   [Media Unavailable] Controlled Substance Agreement - Opioid - Scan on 12/19/2018  1:21 PM       Who prescribed the medication?: Dr Terry    Do you need a refill? Yes

## 2023-08-01 RX ORDER — OXYCODONE AND ACETAMINOPHEN 7.5; 325 MG/1; MG/1
1-2 TABLET ORAL 3 TIMES DAILY PRN
Qty: 150 TABLET | Refills: 0 | Status: SHIPPED | OUTPATIENT
Start: 2023-08-01 | End: 2023-08-31

## 2023-08-01 NOTE — TELEPHONE ENCOUNTER
[place] no [place] outside of this office.   Last fileld 6/30/23  Last visit 6/19/23  CSA 5/24/22  UDS 6/28/23  Med refilled. Overdue for substance agreement - assist with scheduling virtual visit with Dr. Terry

## 2023-08-01 NOTE — TELEPHONE ENCOUNTER
Pt notified and stated he did send in a CSA but I dont see this in pt chart. I will resend CSA to pt.  Karen Wallis, CMA

## 2023-08-30 ENCOUNTER — TELEPHONE (OUTPATIENT)
Dept: FAMILY MEDICINE | Facility: CLINIC | Age: 53
End: 2023-08-30
Payer: COMMERCIAL

## 2023-08-30 DIAGNOSIS — F11.90 CHRONIC, CONTINUOUS USE OF OPIOIDS: ICD-10-CM

## 2023-08-30 DIAGNOSIS — M25.561 CHRONIC PAIN OF RIGHT KNEE: ICD-10-CM

## 2023-08-30 DIAGNOSIS — G89.29 CHRONIC PAIN OF RIGHT KNEE: ICD-10-CM

## 2023-08-30 NOTE — TELEPHONE ENCOUNTER
Medication Question or Refill        What medication are you calling about (include dose and sig)?:     Preferred Pharmacy:   [facility] #[id number] - [place], [place] - [address] AT [facility] [address]  [place] [address]  Phone: [phone] Fax: [phone]        Controlled Substance Agreement on file:   CSA -- Patient Level:     [Media Unavailable] Controlled Substance Agreement - Opioid - Scan on 5/24/2022 12:11 PM   [Media Unavailable] Controlled Substance Agreement - Opioid - Scan on 3/20/2021  4:56 PM   [Media Unavailable] Controlled Substance Agreement - Opioid - Scan on 12/19/2018  1:21 PM       Who prescribed the medication?:     Do you need a refill? Yes    When did you use the medication last?     Patient offered an appointment? No    Do you have any questions or concerns?  No, please send to above pharmacy,       Could we send this information to you in [facility] or would you prefer to receive a phone call?:   Patient would prefer a phone call   Okay to leave a detailed message?: Yes at Cell number on file:    Telephone Information:   Mobile [phone]

## 2023-08-31 RX ORDER — OXYCODONE AND ACETAMINOPHEN 7.5; 325 MG/1; MG/1
1-2 TABLET ORAL 3 TIMES DAILY PRN
Qty: 150 TABLET | Refills: 0 | Status: SHIPPED | OUTPATIENT
Start: 2023-08-31 | End: 2023-10-03

## 2023-09-01 ENCOUNTER — TELEPHONE (OUTPATIENT)
Dept: FAMILY MEDICINE | Facility: CLINIC | Age: 53
End: 2023-09-01

## 2023-09-01 DIAGNOSIS — M25.561 CHRONIC PAIN OF RIGHT KNEE: ICD-10-CM

## 2023-09-01 DIAGNOSIS — G89.29 CHRONIC PAIN OF RIGHT KNEE: ICD-10-CM

## 2023-09-01 DIAGNOSIS — F11.90 CHRONIC, CONTINUOUS USE OF OPIOIDS: ICD-10-CM

## 2023-09-01 RX ORDER — OXYCODONE AND ACETAMINOPHEN 7.5; 325 MG/1; MG/1
1-2 TABLET ORAL 3 TIMES DAILY PRN
Qty: 150 TABLET | Refills: 0 | Status: CANCELLED | OUTPATIENT
Start: 2023-09-01

## 2023-09-01 NOTE — TELEPHONE ENCOUNTER
Duplicate. Rx was sent 08/31/2023. Please notify patient.    Ursula Hu RN   [facility]

## 2023-09-01 NOTE — TELEPHONE ENCOUNTER
Medication Question or Refill        What medication are you calling about (include dose and sig)?: oxyCODONE-acetaminophen (PERCOCET) 7.5-325 MG per tablet     Preferred Pharmacy:[facility] #[id number] - [place] - [address] AT [facility] [address]  [place] [address]  Phone: [phone] Fax: [phone]        Controlled Substance Agreement on file:   CSA -- Patient Level:     [Media Unavailable] Controlled Substance Agreement - Opioid - Scan on 5/24/2022 12:11 PM   [Media Unavailable] Controlled Substance Agreement - Opioid - Scan on 3/20/2021  4:56 PM   [Media Unavailable] Controlled Substance Agreement - Opioid - Scan on 12/19/2018  1:21 PM       Who prescribed the medication?: Dt Mara    Do you need a refill? Yes    When did you use the medication last? today

## 2023-10-02 DIAGNOSIS — G89.29 CHRONIC PAIN OF RIGHT KNEE: ICD-10-CM

## 2023-10-02 DIAGNOSIS — F11.90 CHRONIC, CONTINUOUS USE OF OPIOIDS: ICD-10-CM

## 2023-10-02 DIAGNOSIS — M25.561 CHRONIC PAIN OF RIGHT KNEE: ICD-10-CM

## 2023-10-03 RX ORDER — OXYCODONE AND ACETAMINOPHEN 7.5; 325 MG/1; MG/1
1-2 TABLET ORAL 3 TIMES DAILY PRN
Qty: 150 TABLET | Refills: 0 | Status: SHIPPED | OUTPATIENT
Start: 2023-10-03 | End: 2023-10-31

## 2023-10-31 DIAGNOSIS — M25.561 CHRONIC PAIN OF RIGHT KNEE: ICD-10-CM

## 2023-10-31 DIAGNOSIS — F11.90 CHRONIC, CONTINUOUS USE OF OPIOIDS: ICD-10-CM

## 2023-10-31 DIAGNOSIS — G89.29 CHRONIC PAIN OF RIGHT KNEE: ICD-10-CM

## 2023-10-31 RX ORDER — OXYCODONE AND ACETAMINOPHEN 7.5; 325 MG/1; MG/1
1-2 TABLET ORAL 3 TIMES DAILY PRN
Qty: 150 TABLET | Refills: 0 | Status: SHIPPED | OUTPATIENT
Start: 2023-10-31 | End: 2023-11-14

## 2023-10-31 NOTE — TELEPHONE ENCOUNTER
Medication Question or Refill        What medication are you calling about (include dose and sig)?: Pending Prescriptions:                       Disp   Refills    oxyCODONE-acetaminophen (PERCOCET) 7.5-32*150 ta*0            Sig: Take 1-2 tablets by mouth 3 times daily as needed           for pain       CSA -- Patient Level:     [Media Unavailable] Controlled Substance Agreement - Opioid - Scan on 9/1/2023  1:51 PM   [Media Unavailable] Controlled Substance Agreement - Opioid - Scan on 5/24/2022 12:11 PM   [Media Unavailable] Controlled Substance Agreement - Opioid - Scan on 3/20/2021  4:56 PM   [Media Unavailable] Controlled Substance Agreement - Opioid - Scan on 12/19/2018  1:21 PM         Who prescribed the medication?: Lizett Terry      Do you need a refill? Yes:     Note from pharmacy?  No    Rx and pharmacy pended per refill request.  Karen Wallis, CMA

## 2023-11-14 ENCOUNTER — OFFICE VISIT (OUTPATIENT)
Dept: FAMILY MEDICINE | Facility: CLINIC | Age: 53
End: 2023-11-14
Payer: COMMERCIAL

## 2023-11-14 VITALS
DIASTOLIC BLOOD PRESSURE: 85 MMHG | OXYGEN SATURATION: 98 % | WEIGHT: 213.8 LBS | BODY MASS INDEX: 36.5 KG/M2 | HEIGHT: 64 IN | SYSTOLIC BLOOD PRESSURE: 129 MMHG | TEMPERATURE: 98.1 F | RESPIRATION RATE: 15 BRPM | HEART RATE: 102 BPM

## 2023-11-14 DIAGNOSIS — F11.90 CHRONIC, CONTINUOUS USE OF OPIOIDS: ICD-10-CM

## 2023-11-14 DIAGNOSIS — Z12.11 SCREEN FOR COLON CANCER: ICD-10-CM

## 2023-11-14 DIAGNOSIS — Z12.5 PROSTATE CANCER SCREENING: ICD-10-CM

## 2023-11-14 DIAGNOSIS — Z13.6 CARDIOVASCULAR SCREENING; LDL GOAL LESS THAN 70: ICD-10-CM

## 2023-11-14 DIAGNOSIS — I10 HYPERTENSION GOAL BP (BLOOD PRESSURE) < 140/90: ICD-10-CM

## 2023-11-14 DIAGNOSIS — E66.01 SEVERE OBESITY (BMI 35.0-39.9) WITH COMORBIDITY (H): ICD-10-CM

## 2023-11-14 DIAGNOSIS — M25.561 CHRONIC PAIN OF RIGHT KNEE: ICD-10-CM

## 2023-11-14 DIAGNOSIS — Z23 NEED FOR PROPHYLACTIC VACCINATION AND INOCULATION AGAINST INFLUENZA: ICD-10-CM

## 2023-11-14 DIAGNOSIS — E11.65 TYPE 2 DIABETES MELLITUS WITH HYPERGLYCEMIA, WITHOUT LONG-TERM CURRENT USE OF INSULIN (H): Primary | ICD-10-CM

## 2023-11-14 DIAGNOSIS — N52.9 ERECTILE DYSFUNCTION, UNSPECIFIED ERECTILE DYSFUNCTION TYPE: ICD-10-CM

## 2023-11-14 DIAGNOSIS — G89.29 CHRONIC PAIN OF RIGHT KNEE: ICD-10-CM

## 2023-11-14 DIAGNOSIS — Z23 HIGH PRIORITY FOR 2019-NCOV VACCINE: ICD-10-CM

## 2023-11-14 PROCEDURE — 90471 IMMUNIZATION ADMIN: CPT | Performed by: FAMILY MEDICINE

## 2023-11-14 PROCEDURE — 90686 IIV4 VACC NO PRSV 0.5 ML IM: CPT | Performed by: FAMILY MEDICINE

## 2023-11-14 PROCEDURE — 90480 ADMN SARSCOV2 VAC 1/ONLY CMP: CPT | Performed by: FAMILY MEDICINE

## 2023-11-14 PROCEDURE — 91320 SARSCV2 VAC 30MCG TRS-SUC IM: CPT | Performed by: FAMILY MEDICINE

## 2023-11-14 PROCEDURE — 99214 OFFICE O/P EST MOD 30 MIN: CPT | Mod: 25 | Performed by: FAMILY MEDICINE

## 2023-11-14 RX ORDER — AMLODIPINE BESYLATE 10 MG/1
10 TABLET ORAL DAILY
Qty: 90 TABLET | Refills: 1 | Status: SHIPPED | OUTPATIENT
Start: 2023-11-14 | End: 2024-04-15

## 2023-11-14 RX ORDER — CHLORTHALIDONE 25 MG/1
25 TABLET ORAL DAILY
Qty: 90 TABLET | Refills: 1 | Status: SHIPPED | OUTPATIENT
Start: 2023-11-14 | End: 2024-04-29

## 2023-11-14 RX ORDER — GLIMEPIRIDE 4 MG/1
8 TABLET ORAL DAILY
Qty: 180 TABLET | Refills: 1 | Status: SHIPPED | OUTPATIENT
Start: 2023-11-14 | End: 2024-04-29

## 2023-11-14 RX ORDER — OXYCODONE AND ACETAMINOPHEN 7.5; 325 MG/1; MG/1
1-2 TABLET ORAL 3 TIMES DAILY PRN
Qty: 150 TABLET | Refills: 0 | Status: SHIPPED | OUTPATIENT
Start: 2023-11-27 | End: 2023-11-30

## 2023-11-14 RX ORDER — LOSARTAN POTASSIUM 50 MG/1
50 TABLET ORAL DAILY
Qty: 90 TABLET | Refills: 1 | Status: SHIPPED | OUTPATIENT
Start: 2023-11-14 | End: 2024-04-29

## 2023-11-14 RX ORDER — PIOGLITAZONEHYDROCHLORIDE 45 MG/1
45 TABLET ORAL DAILY
Qty: 90 TABLET | Refills: 1 | Status: SHIPPED | OUTPATIENT
Start: 2023-11-14 | End: 2024-04-29

## 2023-11-14 RX ORDER — TADALAFIL 20 MG/1
TABLET ORAL
Qty: 20 TABLET | Refills: 5 | Status: SHIPPED | OUTPATIENT
Start: 2023-11-14

## 2023-11-14 ASSESSMENT — PATIENT HEALTH QUESTIONNAIRE - PHQ9
10. IF YOU CHECKED OFF ANY PROBLEMS, HOW DIFFICULT HAVE THESE PROBLEMS MADE IT FOR YOU TO DO YOUR WORK, TAKE CARE OF THINGS AT HOME, OR GET ALONG WITH OTHER PEOPLE: NOT DIFFICULT AT ALL
SUM OF ALL RESPONSES TO PHQ QUESTIONS 1-9: 3
SUM OF ALL RESPONSES TO PHQ QUESTIONS 1-9: 3

## 2023-11-14 NOTE — PROGRESS NOTES
Prior to immunization administration, verified patients identity using patient s name and date of birth. Please see Immunization Activity for additional information.     Screening Questionnaire for Adult Immunization    Are you sick today?   No   Do you have allergies to medications, food, a vaccine component or latex?   No   Have you ever had a serious reaction after receiving a vaccination?   No   Do you have a long-term health problem with heart, lung, kidney, or metabolic disease (e.g., diabetes), asthma, a blood disorder, no spleen, complement component deficiency, a cochlear implant, or a spinal fluid leak?  Are you on long-term aspirin therapy?   No   Do you have cancer, leukemia, HIV/AIDS, or any other immune system problem?   No   Do you have a parent, brother, or sister with an immune system problem?   No   In the past 3 months, have you taken medications that affect  your immune system, such as prednisone, other steroids, or anticancer drugs; drugs for the treatment of rheumatoid arthritis, Crohn s disease, or psoriasis; or have you had radiation treatments?   No   Have you had a seizure, or a brain or other nervous system problem?   No   During the past year, have you received a transfusion of blood or blood    products, or been given immune (gamma) globulin or antiviral drug?   No   For women: Are you pregnant or is there a chance you could become       pregnant during the next month?   No   Have you received any vaccinations in the past 4 weeks?   No     Immunization questionnaire answers were all negative.      Patient instructed to remain in clinic for 15 minutes afterwards, and to report any adverse reactions.     Screening performed by Kendell Dominguez MA on 11/14/2023 at 3:15 PM.

## 2023-11-14 NOTE — PROGRESS NOTES
Assessment & Plan     Type 2 diabetes mellitus with hyperglycemia, without long-term current use of insulin (H)  Last A1c 7.0.  Hoping it is under control is that been an issue in the past  - empagliflozin (JARDIANCE) 10 MG TABS tablet; Take 1 tablet (10 mg) by mouth daily  - glimepiride (AMARYL) 4 MG tablet; Take 2 tablets (8 mg) by mouth daily Profile Rx  - metFORMIN (GLUCOPHAGE) 500 MG tablet; Take 2 tablets (1,000 mg) by mouth 2 times daily (with meals) Profile Rx  - pioglitazone (ACTOS) 45 MG tablet; Take 1 tablet (45 mg) by mouth daily Profile Rx  - Lipid panel reflex to direct LDL Fasting; Future  - Comprehensive metabolic panel; Future  - Hemoglobin A1c; Future  - Albumin Random Urine Quantitative with Creat Ratio; Future    Hypertension goal BP (blood pressure) < 140/90  Stable on current regimen.  Continue same plan and routine follow-up.   - amLODIPine (NORVASC) 10 MG tablet; Take 1 tablet (10 mg) by mouth daily Profile Rx  - chlorthalidone (HYGROTON) 25 MG tablet; Take 1 tablet (25 mg) by mouth daily Profile Rx  - losartan (COZAAR) 50 MG tablet; Take 1 tablet (50 mg) by mouth daily Profile Rx    Severe obesity (BMI 35.0-39.9) with comorbidity (H)  Again discussed diet and exercise to help lose weight    CARDIOVASCULAR SCREENING; LDL GOAL LESS THAN 70  Continuing to follow    Chronic, continuous use of opioids  Up-to-date on routine monitoring  - oxyCODONE-acetaminophen (PERCOCET) 7.5-325 MG per tablet; Take 1-2 tablets by mouth 3 times daily as needed for pain  - Drug Abuse Screen Panel 13, Urine (Pain Care Map) - lab collect; Future    Chronic pain of right knee    - oxyCODONE-acetaminophen (PERCOCET) 7.5-325 MG per tablet; Take 1-2 tablets by mouth 3 times daily as needed for pain    Prostate cancer screening    - Prostate Specific Antigen Screen; Future    Screen for colon cancer    - Colonoscopy Screening  Referral; Future    Need for prophylactic vaccination and inoculation against  "influenza      High priority for 2019-nCoV vaccine      Erectile dysfunction, unspecified erectile dysfunction type    - tadalafil (CIALIS) 20 MG tablet; TAKE 1 TABLET BY MOUTH DAILY AS NEEDED       See Patient Instructions    Lizett Terry MD  [facility] SUN Brown is a 53 year old, presenting for the following health issues:  Diabetes, Asthma, Headache, Imm/Inj (Flu Shot), and Imm/Inj (COVID-19 VACCINE)        11/14/2023     2:27 PM   Additional Questions   Roomed by Judy   Accompanied by Self         11/14/2023     2:27 PM   Patient Reported Additional Medications   Patient reports taking the following new medications None       History of Present Illness     Asthma:  He presents for follow up of asthma.  He has no cough, some wheezing, and some shortness of breath.  He is using a relief medication a few times a week. He does not have a controller medication. Patient is aware of the following triggers: none and unaware of any triggers. The patient has not had a visit to the Emergency Room, Urgent Care or Hospital due to asthma since the last clinic visit.     Diabetes:   He presents for follow up of diabetes.  He is checking home blood glucose a few times a month.   He checks blood glucose before meals.  Blood glucose is sometimes over 200 and sometimes under 70. He is aware of hypoglycemia symptoms including confusion and other.    He has no concerns regarding his diabetes at this time.   He is not experiencing numbness or burning in feet, excessive thirst, blurry vision, weight changes or redness, sores or blisters on feet.           Headaches:   Since the patient's last clinic visit, headaches are: improved  The patient is getting headaches:  Not ofter  He is able to do normal daily activities when he has a migraine.  The patient is taking the following rescue/relief medications:  No rescue/relief medications   Patient states \"I get no relief\" from the rescue/relief " "medications.   The patient is taking the following medications to prevent migraines:  No medications to prevent migraines  In the past 4 weeks, the patient has gone to an Urgent Care or Emergency Room 0 times times due to headaches.    Reason for visit:  Follow up &stated reason    He eats 0-1 servings of fruits and vegetables daily.He consumes 2 sweetened beverage(s) daily.He exercises with enough effort to increase his heart rate 10 to 19 minutes per day.  He exercises with enough effort to increase his heart rate 3 or less days per week. He is missing 2 dose(s) of medications per week.           Here today in follow-up of diabetes, hypertension, knee pain.  Has been under a bit of stress as his job is being eliminated.  Has extra pay until February and then severance after that      Review of Systems   Constitutional, HEENT, cardiovascular, pulmonary, gi and gu systems are negative, except as otherwise noted.      Objective    /85 (BP Location: Right arm, Patient Position: Sitting, Cuff Size: Adult Large)   Pulse 102   Temp 98.1  F (36.7  C) (Oral)   Resp 15   Ht 1.626 m (5' 4\")   Wt 97 kg (213 lb 12.8 oz)   SpO2 98%   BMI 36.70 kg/m    Body mass index is 36.7 kg/m .  Physical Exam   Alert, pleasant, upbeat, and in no apparent discomfort.  S1 and S2 normal, no murmurs, clicks, gallops or rubs. Regular rate and rhythm. Chest is clear; no wheezes or rales. No edema or JVD.  Past labs reviewed with the patient.                       Answers submitted by the patient for this visit:  Patient Health Questionnaire (Submitted on 11/14/2023)  If you checked off any problems, how difficult have these problems made it for you to do your work, take care of things at home, or get along with other people?: Not difficult at all  PHQ9 TOTAL SCORE: 3  Diabetes Visit (Submitted on 11/14/2023)  Chief Complaint: Chronic problems general questions HPI Form  Frequency of checking blood sugars:: a few times a month  What " time of day are you checking your blood sugars : before meals  Have you had any blood sugars above 200?: Yes  Have you had any blood sugars below 70?: Yes  Hypoglycemia symptoms:: confusion, other  Diabetic concerns:: none  Paraesthesia present:: none of these symptoms  Migraine Visit Questionnaire (Submitted on 11/14/2023)  Chief Complaint: Chronic problems general questions HPI Form  Headache Symptoms are: improved  How often are you getting headaches or migraines? : not ofter  Are you able to do normal daily activities when you have a migraine?: Yes  Migraine Rescue/Relief Medications:: no rescue/relief medications  Effectiveness of rescue/relief medications:: I get no relief  Migraine Preventative Medications:: no medications to prevent migraines  ER or UC Visits:: 0 times  Asthma Visit Questionnaire (Submitted on 11/14/2023)  Chief Complaint: Chronic problems general questions HPI Form  Do you have a cough?: No  Are you experiencing any wheezing in your chest?: Yes  Do you have any shortness of breath?: Yes  Use of rescue inhaler:: a few times a week  Taking Asthma medication as prescribed:: I do not have an asthma controller medication  Asthma triggers:: none, unaware of any triggers  Have you had any Emergency Room visits, Urgent Care visits, or Hospital Admissions since your last office visit?: No  General Questionnaire (Submitted on 11/14/2023)  Chief Complaint: Chronic problems general questions HPI Form  What is the reason for your visit today? : follow up &stated reason  How many servings of fruits and vegetables do you eat daily?: 0-1  On average, how many sweetened beverages do you drink each day (Examples: soda, juice, sweet tea, etc.  Do NOT count diet or artificially sweetened beverages)?: 2  How many minutes a day do you exercise enough to make your heart beat faster?: 10 to 19  How many days a week do you exercise enough to make your heart beat faster?: 3 or less  How many days per week do you  miss taking your medication?: 2

## 2023-11-21 ENCOUNTER — LAB (OUTPATIENT)
Dept: LAB | Facility: CLINIC | Age: 53
End: 2023-11-21
Payer: COMMERCIAL

## 2023-11-21 DIAGNOSIS — E11.65 TYPE 2 DIABETES MELLITUS WITH HYPERGLYCEMIA, WITHOUT LONG-TERM CURRENT USE OF INSULIN (H): ICD-10-CM

## 2023-11-21 DIAGNOSIS — Z12.5 PROSTATE CANCER SCREENING: ICD-10-CM

## 2023-11-21 DIAGNOSIS — F11.90 CHRONIC, CONTINUOUS USE OF OPIOIDS: ICD-10-CM

## 2023-11-21 LAB
ALBUMIN SERPL BCG-MCNC: 4.1 G/DL (ref 3.5–5.2)
ALP SERPL-CCNC: 63 U/L (ref 40–150)
ALT SERPL W P-5'-P-CCNC: 15 U/L (ref 0–70)
AMPHETAMINES UR QL: NOT DETECTED
ANION GAP SERPL CALCULATED.3IONS-SCNC: 9 MMOL/L (ref 7–15)
AST SERPL W P-5'-P-CCNC: 22 U/L (ref 0–45)
BARBITURATES UR QL SCN: NOT DETECTED
BENZODIAZ UR QL SCN: NOT DETECTED
BILIRUB SERPL-MCNC: 0.3 MG/DL
BUN SERPL-MCNC: 11.7 MG/DL (ref 6–20)
BUPRENORPHINE UR QL: NOT DETECTED
CALCIUM SERPL-MCNC: 9.3 MG/DL (ref 8.6–10)
CANNABINOIDS UR QL: NOT DETECTED
CHLORIDE SERPL-SCNC: 107 MMOL/L (ref 98–107)
CHOLEST SERPL-MCNC: 147 MG/DL
COCAINE UR QL SCN: NOT DETECTED
CREAT SERPL-MCNC: 0.97 MG/DL (ref 0.67–1.17)
CREAT UR-MCNC: 357 MG/DL
D-METHAMPHET UR QL: NOT DETECTED
DEPRECATED HCO3 PLAS-SCNC: 26 MMOL/L (ref 22–29)
EGFRCR SERPLBLD CKD-EPI 2021: >90 ML/MIN/1.73M2
GLUCOSE SERPL-MCNC: 173 MG/DL (ref 70–99)
HBA1C MFR BLD: 8 % (ref 0–5.6)
HDLC SERPL-MCNC: 41 MG/DL
LDLC SERPL CALC-MCNC: 89 MG/DL
METHADONE UR QL SCN: NOT DETECTED
MICROALBUMIN UR-MCNC: 93.5 MG/L
MICROALBUMIN/CREAT UR: 26.19 MG/G CR (ref 0–17)
NONHDLC SERPL-MCNC: 106 MG/DL
OPIATES UR QL SCN: NOT DETECTED
OXYCODONE UR QL SCN: DETECTED
PCP UR QL SCN: NOT DETECTED
POTASSIUM SERPL-SCNC: 4.3 MMOL/L (ref 3.4–5.3)
PROT SERPL-MCNC: 6.9 G/DL (ref 6.4–8.3)
PSA SERPL DL<=0.01 NG/ML-MCNC: 2.91 NG/ML (ref 0–3.5)
SODIUM SERPL-SCNC: 142 MMOL/L (ref 135–145)
TRICYCLICS UR QL SCN: NOT DETECTED
TRIGL SERPL-MCNC: 84 MG/DL

## 2023-11-21 PROCEDURE — 82570 ASSAY OF URINE CREATININE: CPT | Mod: 59

## 2023-11-21 PROCEDURE — G0103 PSA SCREENING: HCPCS

## 2023-11-21 PROCEDURE — 83036 HEMOGLOBIN GLYCOSYLATED A1C: CPT

## 2023-11-21 PROCEDURE — 82043 UR ALBUMIN QUANTITATIVE: CPT

## 2023-11-21 PROCEDURE — 80053 COMPREHEN METABOLIC PANEL: CPT

## 2023-11-21 PROCEDURE — 80061 LIPID PANEL: CPT

## 2023-11-21 PROCEDURE — 80306 DRUG TEST PRSMV INSTRMNT: CPT

## 2023-11-21 PROCEDURE — 36415 COLL VENOUS BLD VENIPUNCTURE: CPT

## 2023-11-26 NOTE — RESULT ENCOUNTER NOTE
Kevin,  Most your lab work looks great, but your sugar is back up again.  A1c to 8.0 so what ever you are doing before, get back to doing it because that looked fantastic.    STEW Terry M.D.

## 2023-11-30 ENCOUNTER — TELEPHONE (OUTPATIENT)
Dept: FAMILY MEDICINE | Facility: CLINIC | Age: 53
End: 2023-11-30
Payer: COMMERCIAL

## 2023-11-30 DIAGNOSIS — F11.90 CHRONIC, CONTINUOUS USE OF OPIOIDS: ICD-10-CM

## 2023-11-30 DIAGNOSIS — M25.561 CHRONIC PAIN OF RIGHT KNEE: ICD-10-CM

## 2023-11-30 DIAGNOSIS — G89.29 CHRONIC PAIN OF RIGHT KNEE: ICD-10-CM

## 2023-11-30 RX ORDER — OXYCODONE AND ACETAMINOPHEN 7.5; 325 MG/1; MG/1
1-2 TABLET ORAL 3 TIMES DAILY PRN
Qty: 150 TABLET | Refills: 0 | Status: SHIPPED | OUTPATIENT
Start: 2023-11-30 | End: 2023-12-29

## 2023-11-30 NOTE — TELEPHONE ENCOUNTER
Reason for Call:  Medication or medication refill:    Do you use a [facility]?  Name of the pharmacy and phone number for the current request:  [facility]     Name of the medication requested: oxyCODONE-acetaminophen (PERCOCET) 7.5-325 MG per tablet     Other request:     Can we leave a detailed message on this number? YES    Phone number patient can be reached at: Cell number on file:    Telephone Information:   Mobile [phone]       Best Time:     Call taken on 11/30/2023 at 10:20 AM by Nithya Stuart

## 2023-12-07 ENCOUNTER — TELEPHONE (OUTPATIENT)
Dept: FAMILY MEDICINE | Facility: CLINIC | Age: 53
End: 2023-12-07

## 2023-12-07 DIAGNOSIS — E11.65 TYPE 2 DIABETES MELLITUS WITH HYPERGLYCEMIA, WITHOUT LONG-TERM CURRENT USE OF INSULIN (H): Primary | ICD-10-CM

## 2023-12-07 RX ORDER — ACYCLOVIR 400 MG/1
TABLET ORAL
Qty: 1 EACH | Refills: 0 | Status: SHIPPED | OUTPATIENT
Start: 2023-12-07 | End: 2024-04-29

## 2023-12-07 RX ORDER — ACYCLOVIR 400 MG/1
TABLET ORAL
Qty: 3 EACH | Refills: 5 | Status: SHIPPED | OUTPATIENT
Start: 2023-12-07 | End: 2024-04-29

## 2023-12-07 NOTE — TELEPHONE ENCOUNTER
Patient calling regarding his currently prescribed Dexcom G6 continuous blood sugar monitor. He is requesting if he can upgrade to the Dexcom G7 monitor. Notified patient that Dr. Terry was not in office today, and patient was wondering if another provider would be able to address sending a prescription for the upgraded G7 monitor or if he has to wait for Dr. Terry to be back in office. Preferred pharmacy is [facility] in [place] is preferred if prescription can be written.    Writer notes provider is in today. Routing to provider to review and advise if patient can have a prescription to upgrade his Decom G6 monitor to the updated G7 version per patient request.    Ton Estrada RN  [facility]

## 2023-12-14 DIAGNOSIS — E11.65 TYPE 2 DIABETES MELLITUS WITH HYPERGLYCEMIA, WITHOUT LONG-TERM CURRENT USE OF INSULIN (H): ICD-10-CM

## 2023-12-14 RX ORDER — LANCETS
EACH MISCELLANEOUS
Qty: 100 EACH | Refills: 6 | Status: SHIPPED | OUTPATIENT
Start: 2023-12-14 | End: 2024-04-29

## 2023-12-14 RX ORDER — UBIQUINOL 100 MG
CAPSULE ORAL
Qty: 100 EACH | Refills: 3 | Status: SHIPPED | OUTPATIENT
Start: 2023-12-14 | End: 2023-12-15

## 2023-12-15 RX ORDER — UBIQUINOL 100 MG
CAPSULE ORAL
Qty: 100 EACH | Refills: 3 | Status: SHIPPED | OUTPATIENT
Start: 2023-12-15 | End: 2024-04-29

## 2023-12-29 DIAGNOSIS — G89.29 CHRONIC PAIN OF RIGHT KNEE: ICD-10-CM

## 2023-12-29 DIAGNOSIS — M25.561 CHRONIC PAIN OF RIGHT KNEE: ICD-10-CM

## 2023-12-29 DIAGNOSIS — F11.90 CHRONIC, CONTINUOUS USE OF OPIOIDS: ICD-10-CM

## 2023-12-29 RX ORDER — OXYCODONE AND ACETAMINOPHEN 7.5; 325 MG/1; MG/1
1-2 TABLET ORAL 3 TIMES DAILY PRN
Qty: 150 TABLET | Refills: 0 | Status: SHIPPED | OUTPATIENT
Start: 2023-12-29 | End: 2024-01-29

## 2023-12-29 NOTE — TELEPHONE ENCOUNTER
Medication Question or Refill        What medication are you calling about (include dose and sig)?: Oxycodone    Preferred Pharmacy:  [id number] - [place] [facility] - [address] [facility] [address]  [place] [address]  Phone: [phone] Fax: [phone]      Who prescribed the medication?: Lizett Terry

## 2024-01-19 DIAGNOSIS — E11.65 TYPE 2 DIABETES MELLITUS WITH HYPERGLYCEMIA, WITHOUT LONG-TERM CURRENT USE OF INSULIN (H): ICD-10-CM

## 2024-01-22 RX ORDER — BLOOD SUGAR DIAGNOSTIC
STRIP MISCELLANEOUS
Qty: 100 STRIP | Refills: 0 | Status: SHIPPED | OUTPATIENT
Start: 2024-01-22 | End: 2024-02-08

## 2024-01-29 DIAGNOSIS — M25.561 CHRONIC PAIN OF RIGHT KNEE: ICD-10-CM

## 2024-01-29 DIAGNOSIS — G89.29 CHRONIC PAIN OF RIGHT KNEE: ICD-10-CM

## 2024-01-29 DIAGNOSIS — F11.90 CHRONIC, CONTINUOUS USE OF OPIOIDS: ICD-10-CM

## 2024-01-29 RX ORDER — OXYCODONE AND ACETAMINOPHEN 7.5; 325 MG/1; MG/1
1-2 TABLET ORAL 3 TIMES DAILY PRN
Qty: 150 TABLET | Refills: 0 | Status: SHIPPED | OUTPATIENT
Start: 2024-01-29 | End: 2024-02-05

## 2024-01-29 NOTE — TELEPHONE ENCOUNTER
Medication Question or Refill        What medication are you calling about (include dose and sig)?:   oxyCODONE-acetaminophen (PERCOCET) 7.5-325 MG per tablet 150 tablet 0 12/29/2023 -- No   Sig - Route: Take 1-2 tablets by mouth 3 times daily as needed for pain - Oral     Preferred Pharmacy:     Controlled Substance Agreement on file:   CSA -- Patient Level:     [Media Unavailable] Controlled Substance Agreement - Opioid - Scan on 9/1/2023  1:51 PM   [Media Unavailable] Controlled Substance Agreement - Opioid - Scan on 5/24/2022 12:11 PM   [Media Unavailable] Controlled Substance Agreement - Opioid - Scan on 3/20/2021  4:56 PM   [Media Unavailable] Controlled Substance Agreement - Opioid - Scan on 12/19/2018  1:21 PM       Who prescribed the medication?: Dr. Terry    Do you need a refill? Yes    Could we send this information to you in [facility] or would you prefer to receive a phone call?:   Patient would prefer a phone call   Okay to leave a detailed message?: Yes at Cell number on file:    Telephone Information:   Mobile [phone]

## 2024-02-05 ENCOUNTER — TELEPHONE (OUTPATIENT)
Dept: FAMILY MEDICINE | Facility: CLINIC | Age: 54
End: 2024-02-05
Payer: COMMERCIAL

## 2024-02-05 DIAGNOSIS — M25.561 CHRONIC PAIN OF RIGHT KNEE: ICD-10-CM

## 2024-02-05 DIAGNOSIS — G89.29 CHRONIC PAIN OF RIGHT KNEE: ICD-10-CM

## 2024-02-05 DIAGNOSIS — F11.90 CHRONIC, CONTINUOUS USE OF OPIOIDS: ICD-10-CM

## 2024-02-05 RX ORDER — OXYCODONE AND ACETAMINOPHEN 7.5; 325 MG/1; MG/1
1-2 TABLET ORAL 3 TIMES DAILY PRN
Qty: 150 TABLET | Refills: 0 | Status: SHIPPED | OUTPATIENT
Start: 2024-02-05 | End: 2024-02-06

## 2024-02-05 NOTE — TELEPHONE ENCOUNTER
Pt calling to see it provider can sent this to [facility]  [place] - [address] [place] [address]. His insurance changed and he couldn't it on 1/29 so calling to get it transfer and the pharmacy told him to call us.

## 2024-02-05 NOTE — TELEPHONE ENCOUNTER
[facility] pharmacy in [facility]. Pharmacy currently does not have Percocet 7.5 in stock as they have been having trouble getting the medication filled and would have to be sent to another pharmacy.    Pharmacy also had concern that diagnosis code for percocet states is f11.90 which is opioid dependence and wondering why they would have percocet prescribed for someone with opioid dependence.    Routing to provider to review and advise.    Ton Estrada RN  [facility]

## 2024-02-05 NOTE — TELEPHONE ENCOUNTER
Refilled x 1.  Needs visit (OFV or video visit) before any further refill.   Needs pain follow up

## 2024-02-05 NOTE — TELEPHONE ENCOUNTER
"1) F11.90 Refers to \"chronic continuous use of opioids\" which means that they are using them on a regular basis.  It does not mean opioid addiction.    2) okay, looks like the patient might need to find a different pharmacy that has it in stock?  "

## 2024-02-06 RX ORDER — OXYCODONE AND ACETAMINOPHEN 7.5; 325 MG/1; MG/1
1-2 TABLET ORAL 3 TIMES DAILY PRN
Qty: 150 TABLET | Refills: 0 | Status: SHIPPED | OUTPATIENT
Start: 2024-02-06 | End: 2024-02-19

## 2024-02-06 NOTE — TELEPHONE ENCOUNTER
Patient called back and is OK with sending RX to [facility] in [place] due to out of stock at [facility].

## 2024-02-06 NOTE — TELEPHONE ENCOUNTER
RN spoke with [facility] pharmacy staff member to relay provider message below. Pharmacy verbalized good understanding and states that patient will need to have Rx sent to a different pharmacy as they do not have it in stock.     Per refill encounter 2/5/2024 patient requesting Rx to be sent to [facility] in [place] instead of [facility] pharmacy.    DOMINGO Hewitt  [facility] Triage

## 2024-02-07 ENCOUNTER — TRANSFERRED RECORDS (OUTPATIENT)
Dept: HEALTH INFORMATION MANAGEMENT | Facility: CLINIC | Age: 54
End: 2024-02-07
Payer: COMMERCIAL

## 2024-02-07 LAB — RETINOPATHY: POSITIVE

## 2024-02-08 ENCOUNTER — OFFICE VISIT (OUTPATIENT)
Dept: URGENT CARE | Facility: URGENT CARE | Age: 54
End: 2024-02-08
Payer: COMMERCIAL

## 2024-02-08 VITALS
BODY MASS INDEX: 38.17 KG/M2 | RESPIRATION RATE: 16 BRPM | DIASTOLIC BLOOD PRESSURE: 73 MMHG | OXYGEN SATURATION: 97 % | WEIGHT: 222.4 LBS | TEMPERATURE: 97.1 F | HEART RATE: 69 BPM | SYSTOLIC BLOOD PRESSURE: 129 MMHG

## 2024-02-08 DIAGNOSIS — J06.9 ACUTE URI: ICD-10-CM

## 2024-02-08 DIAGNOSIS — J01.90 ACUTE NON-RECURRENT SINUSITIS, UNSPECIFIED LOCATION: ICD-10-CM

## 2024-02-08 DIAGNOSIS — E11.65 TYPE 2 DIABETES MELLITUS WITH HYPERGLYCEMIA, WITHOUT LONG-TERM CURRENT USE OF INSULIN (H): ICD-10-CM

## 2024-02-08 DIAGNOSIS — E11.65 TYPE 2 DIABETES MELLITUS WITH HYPERGLYCEMIA, WITHOUT LONG-TERM CURRENT USE OF INSULIN (H): Primary | ICD-10-CM

## 2024-02-08 PROCEDURE — 87635 SARS-COV-2 COVID-19 AMP PRB: CPT | Performed by: FAMILY MEDICINE

## 2024-02-08 PROCEDURE — 99214 OFFICE O/P EST MOD 30 MIN: CPT | Performed by: FAMILY MEDICINE

## 2024-02-08 RX ORDER — BLOOD SUGAR DIAGNOSTIC
STRIP MISCELLANEOUS
Qty: 100 STRIP | Refills: 0 | Status: SHIPPED | OUTPATIENT
Start: 2024-02-08 | End: 2024-04-29

## 2024-02-08 NOTE — PROGRESS NOTES
(J06.9) Acute URI  Comment: probably viral. He is at higer risk if positive for covid. Recommended meds if postive even though timeline unclear.   Plan: Symptomatic COVID-19 Virus (Coronavirus) by PCR        Nose             (J01.90) Acute non-recurrent sinusitis, unspecified location  Comment:  The patient has a history of occ sinus infection .  Plan: amoxicillin-clavulanate (AUGMENTIN) 875-125 MG         tablet         Likley viral. Given unclear timeline recommended. Symptomatic therapy and Use antibiotics if symptoms persists > 3 days without change.     (E11.65) Type 2 diabetes mellitus with hyperglycemia, without long-term current use of insulin (H)  (primary encounter diagnosis)  Comment: due to follow up late this month   Plan:    -------------------------------  Xavier Mcguire with presents with between 3-8 days symptoms including sore throat, congestion, sinus pressure, chills off and on. No fever measured. At times feels a little shortness of breath but thinks it is from his congestion. Some looser stools.    The patient has a history of diabetes borderline control. Htn good control.     Treatment measures tried include Tylenol/Ibuprofen and OTC Cough med.  Exposures--gkid with a runny nose  Recent travel--no     Current Outpatient Medications   Medication Sig Dispense Refill    ACCU-CHEK GUIDE test strip USE TO TEST ONCE A DAY OR AS DIRECTED 100 strip 0    Alcohol Swabs (ALCOHOL PREP) 70 % PADS USE TO SWAB AREA OF INJECTION AS DIRECTED 100 each 3    amLODIPine (NORVASC) 10 MG tablet Take 1 tablet (10 mg) by mouth daily Profile Rx 90 tablet 1    aspirin 81 MG tablet Take 1 tablet by mouth daily. 100 tablet 3    blood glucose calibration (NO BRAND SPECIFIED) solution Use to calibrate blood glucose monitor as needed as directed. To accompany: Blood Glucose Monitor Brands: per insurance. 1 each 5    blood glucose monitoring (SOFTCLIX) lancets USE TO CHECK BLOOD SUGARS ONCE A DAY OR AS DIRECTED 100 each  6    chlorthalidone (HYGROTON) 25 MG tablet Take 1 tablet (25 mg) by mouth daily Profile Rx 90 tablet 1    Continuous Blood Gluc  (DEXCOM G7 ) ELENA Use to read blood sugars as per 's instructions. 1 each 0    Continuous Blood Gluc Sensor (DEXCOM G6 SENSOR) MISC USE 1 SENSOR EVERY 10 DAYS CHANGE EVERY 10 DAYS. 3 each 5    Continuous Blood Gluc Sensor (DEXCOM G7 SENSOR) MISC Change every 10 days. 3 each 5    Continuous Blood Gluc Transmit (DEXCOM G6 TRANSMITTER) MISC USE 1 TRANSMITTER EVERY 3 MONTHS CHANGE EVERY 3 MONTHS. 1 each 1    empagliflozin (JARDIANCE) 10 MG TABS tablet Take 1 tablet (10 mg) by mouth daily 90 tablet 1    fexofenadine (ALLEGRA) 180 MG tablet Take 1 tablet (180 mg) by mouth daily as needed for allergies 30 tablet 2    glimepiride (AMARYL) 4 MG tablet Take 2 tablets (8 mg) by mouth daily Profile Rx 180 tablet 1    ipratropium - albuterol 0.5 mg/2.5 mg/3 mL (DUONEB) 0.5-2.5 (3) MG/3ML nebulization Take 1 vial (3 mLs) by nebulization every 6 hours as needed for shortness of breath / dyspnea or wheezing 90 vial 0    losartan (COZAAR) 50 MG tablet Take 1 tablet (50 mg) by mouth daily Profile Rx 90 tablet 1    metFORMIN (GLUCOPHAGE) 500 MG tablet Take 2 tablets (1,000 mg) by mouth 2 times daily (with meals) Profile Rx 360 tablet 1    nabumetone (RELAFEN) 750 MG tablet Take 1 tablet (750 mg) by mouth 2 times daily as needed for pain 30 tablet 1    nystatin-triamcinolone (MYCOLOG II) 347098-9.1 UNIT/GM-% external cream Apply topically 2 times daily To feet and toenails. 90 g 3    oxyCODONE-acetaminophen (PERCOCET) 7.5-325 MG per tablet Take 1-2 tablets by mouth 3 times daily as needed for pain 150 tablet 0    pioglitazone (ACTOS) 45 MG tablet Take 1 tablet (45 mg) by mouth daily Profile Rx 90 tablet 1    STATIN NOT PRESCRIBED, INTENTIONAL, continuous prn for other Statin not prescribed intentionally due to Other low LDL  (This option does not exclude patient from measure)  0     tadalafil (CIALIS) 20 MG tablet TAKE 1 TABLET BY MOUTH DAILY AS NEEDED 20 tablet 5       ROS otherwise negative for resp., ID,  HEENT symptoms.    Objective: There were no vitals taken for this visit.  Exam:  GENERAL APPEARANCE: healthy, alert and no distress  EYES: Eyes grossly normal to inspection  HENT: ear canals and TM's normal, nose and mouth without ulcers or lesions, and maxillary sinus tenderness bilateral  NECK: no adenopathy, no asymmetry, masses, or scars and thyroid normal to palpation  RESP: lungs clear to auscultation - no rales, rhonchi or wheezes  CV: regular rates and rhythm, no murmur

## 2024-02-09 LAB — SARS-COV-2 RNA RESP QL NAA+PROBE: NEGATIVE

## 2024-02-19 ENCOUNTER — TELEPHONE (OUTPATIENT)
Dept: FAMILY MEDICINE | Facility: CLINIC | Age: 54
End: 2024-02-19
Payer: COMMERCIAL

## 2024-02-19 DIAGNOSIS — M25.561 CHRONIC PAIN OF RIGHT KNEE: ICD-10-CM

## 2024-02-19 DIAGNOSIS — G89.29 CHRONIC PAIN OF RIGHT KNEE: ICD-10-CM

## 2024-02-19 DIAGNOSIS — F11.90 CHRONIC, CONTINUOUS USE OF OPIOIDS: ICD-10-CM

## 2024-02-19 RX ORDER — OXYCODONE AND ACETAMINOPHEN 7.5; 325 MG/1; MG/1
1-2 TABLET ORAL 3 TIMES DAILY PRN
Qty: 50 TABLET | Refills: 0 | Status: SHIPPED | OUTPATIENT
Start: 2024-02-19 | End: 2024-03-05

## 2024-02-19 NOTE — TELEPHONE ENCOUNTER
Medication Question or Refill        What medication are you calling about (include dose and sig)?: Pending Prescriptions:                       Disp   Refills    oxyCODONE-acetaminophen (PERCOCET) 7.5-32*150 ta*0            Sig: Take 1-2 tablets by mouth 3 times daily as needed           for pain       CSA -- Patient Level:     [Media Unavailable] Controlled Substance Agreement - Opioid - Scan on 9/1/2023  1:51 PM   [Media Unavailable] Controlled Substance Agreement - Opioid - Scan on 5/24/2022 12:11 PM   [Media Unavailable] Controlled Substance Agreement - Opioid - Scan on 3/20/2021  4:56 PM   [Media Unavailable] Controlled Substance Agreement - Opioid - Scan on 12/19/2018  1:21 PM         Who prescribed the medication?: Lizett Terry      Do you need a refill? Yes: Pt was able to only get 100 tablets so he is needing an additional Rx sent for the 50. This is what the pharmacist told him.    Note from pharmacy?  No    Rx and pharmacy pended per refill request.  Karen Wallis, MAINE

## 2024-02-21 ENCOUNTER — TRANSFERRED RECORDS (OUTPATIENT)
Dept: HEALTH INFORMATION MANAGEMENT | Facility: CLINIC | Age: 54
End: 2024-02-21
Payer: COMMERCIAL

## 2024-02-21 LAB — RETINOPATHY: NEGATIVE

## 2024-03-05 DIAGNOSIS — F11.90 CHRONIC, CONTINUOUS USE OF OPIOIDS: ICD-10-CM

## 2024-03-05 DIAGNOSIS — M25.561 CHRONIC PAIN OF RIGHT KNEE: ICD-10-CM

## 2024-03-05 DIAGNOSIS — G89.29 CHRONIC PAIN OF RIGHT KNEE: ICD-10-CM

## 2024-03-05 RX ORDER — OXYCODONE AND ACETAMINOPHEN 7.5; 325 MG/1; MG/1
1-2 TABLET ORAL 3 TIMES DAILY PRN
Qty: 150 TABLET | Refills: 0 | Status: SHIPPED | OUTPATIENT
Start: 2024-03-05 | End: 2024-04-04

## 2024-03-05 NOTE — TELEPHONE ENCOUNTER
Medication Question or Refill        What medication are you calling about (include dose and sig)?: Oxycodone    Preferred Pharmacy:   [facility]/pharmacy #[id number] - [place], [place] - [address] [facility]  [address] [place] [id number]  Phone: [phone] Fax: [phone]      Controlled Substance Agreement on file:   CSA -- Patient Level:     [Media Unavailable] Controlled Substance Agreement - Opioid - Scan on 9/1/2023  1:51 PM   [Media Unavailable] Controlled Substance Agreement - Opioid - Scan on 5/24/2022 12:11 PM   [Media Unavailable] Controlled Substance Agreement - Opioid - Scan on 3/20/2021  4:56 PM   [Media Unavailable] Controlled Substance Agreement - Opioid - Scan on 12/19/2018  1:21 PM       Who prescribed the medication?: PCP    Do you need a refill? Yes    Patient offered an appointment? Yes: 03/13/2024    Do you have any questions or concerns?  No      Could we send this information to you in [facility] or would you prefer to receive a phone call?:   Patient would prefer a phone call   Okay to leave a detailed message?: Yes at Cell number on file:    Telephone Information:   Mobile [phone]

## 2024-04-04 ENCOUNTER — TELEPHONE (OUTPATIENT)
Dept: FAMILY MEDICINE | Facility: CLINIC | Age: 54
End: 2024-04-04
Payer: COMMERCIAL

## 2024-04-04 DIAGNOSIS — Z12.11 COLON CANCER SCREENING: ICD-10-CM

## 2024-04-04 DIAGNOSIS — M25.561 CHRONIC PAIN OF RIGHT KNEE: ICD-10-CM

## 2024-04-04 DIAGNOSIS — E11.65 TYPE 2 DIABETES MELLITUS WITH HYPERGLYCEMIA, WITHOUT LONG-TERM CURRENT USE OF INSULIN (H): Primary | ICD-10-CM

## 2024-04-04 DIAGNOSIS — G89.29 CHRONIC PAIN OF RIGHT KNEE: ICD-10-CM

## 2024-04-04 DIAGNOSIS — F11.90 CHRONIC, CONTINUOUS USE OF OPIOIDS: ICD-10-CM

## 2024-04-04 RX ORDER — OXYCODONE AND ACETAMINOPHEN 7.5; 325 MG/1; MG/1
1-2 TABLET ORAL 3 TIMES DAILY PRN
Qty: 150 TABLET | Refills: 0 | Status: SHIPPED | OUTPATIENT
Start: 2024-04-05 | End: 2024-04-29

## 2024-04-04 NOTE — TELEPHONE ENCOUNTER
Patient calling re: lab orders and oxyCODONE-acetaminophen (PERCOCET) 7.5-325 MG per tablet     Patient was suppose to have an appointment 4/2 with Dr. Price for diabetic check. Patient was running behind arrived late and was not seen.    Patient states that he has an appointment scheduled for 4/11 with Dr. Garcia at [facility].    Patient is wondering if Dr. Terry can put in lab orders to complete prior to this appointment on 4/11.    Patient is hoping by doing these labs he will be able to get a refill on his oxyCODONE-acetaminophen (PERCOCET) 7.5-325 MG per tablet. Patient will run out of medication before the 4/11 appointment and was hoping it could get filled after he completed labs.    RN reviewed chart and saw on 03/05 Dr. Terry mentioned patient needs a pain follow up visit before refilling medication. RN scheduled a virtual pain follow up for 4/29.     Routing to provider to review.    Talia TALBERT RN, BSN  [facility]

## 2024-04-14 DIAGNOSIS — I10 HYPERTENSION GOAL BP (BLOOD PRESSURE) < 140/90: ICD-10-CM

## 2024-04-15 RX ORDER — AMLODIPINE BESYLATE 10 MG/1
10 TABLET ORAL DAILY
Qty: 90 TABLET | Refills: 3 | Status: SHIPPED | OUTPATIENT
Start: 2024-04-15

## 2024-04-29 ENCOUNTER — VIRTUAL VISIT (OUTPATIENT)
Dept: FAMILY MEDICINE | Facility: CLINIC | Age: 54
End: 2024-04-29
Payer: COMMERCIAL

## 2024-04-29 DIAGNOSIS — E11.65 TYPE 2 DIABETES MELLITUS WITH HYPERGLYCEMIA, WITHOUT LONG-TERM CURRENT USE OF INSULIN (H): ICD-10-CM

## 2024-04-29 DIAGNOSIS — E66.01 SEVERE OBESITY (BMI 35.0-39.9) WITH COMORBIDITY (H): ICD-10-CM

## 2024-04-29 DIAGNOSIS — F11.90 CHRONIC, CONTINUOUS USE OF OPIOIDS: ICD-10-CM

## 2024-04-29 DIAGNOSIS — I10 HYPERTENSION GOAL BP (BLOOD PRESSURE) < 140/90: ICD-10-CM

## 2024-04-29 DIAGNOSIS — G89.29 CHRONIC PAIN OF RIGHT KNEE: Primary | ICD-10-CM

## 2024-04-29 DIAGNOSIS — Z13.6 CARDIOVASCULAR SCREENING; LDL GOAL LESS THAN 70: ICD-10-CM

## 2024-04-29 DIAGNOSIS — M25.561 CHRONIC PAIN OF RIGHT KNEE: Primary | ICD-10-CM

## 2024-04-29 PROCEDURE — 99214 OFFICE O/P EST MOD 30 MIN: CPT | Mod: 95 | Performed by: FAMILY MEDICINE

## 2024-04-29 RX ORDER — LOSARTAN POTASSIUM 50 MG/1
50 TABLET ORAL DAILY
Qty: 90 TABLET | Refills: 0 | Status: SHIPPED | OUTPATIENT
Start: 2024-04-29 | End: 2024-05-31

## 2024-04-29 RX ORDER — PIOGLITAZONEHYDROCHLORIDE 45 MG/1
45 TABLET ORAL DAILY
Qty: 90 TABLET | Refills: 0 | Status: SHIPPED | OUTPATIENT
Start: 2024-04-29 | End: 2024-05-31

## 2024-04-29 RX ORDER — CHLORTHALIDONE 25 MG/1
25 TABLET ORAL DAILY
Qty: 90 TABLET | Refills: 0 | Status: SHIPPED | OUTPATIENT
Start: 2024-04-29 | End: 2024-05-31

## 2024-04-29 RX ORDER — OXYCODONE AND ACETAMINOPHEN 7.5; 325 MG/1; MG/1
1-2 TABLET ORAL 3 TIMES DAILY PRN
Qty: 150 TABLET | Refills: 0 | Status: SHIPPED | OUTPATIENT
Start: 2024-05-01 | End: 2024-05-30

## 2024-04-29 RX ORDER — GLIMEPIRIDE 4 MG/1
8 TABLET ORAL DAILY
Qty: 180 TABLET | Refills: 0 | Status: SHIPPED | OUTPATIENT
Start: 2024-04-29 | End: 2024-05-31

## 2024-04-29 NOTE — PROGRESS NOTES
"    Instructions Relayed to Patient by Virtual Roomer:     Patient is active on Withings:   Relayed following to patient: \"It looks like you are active on Withings, are you able to join the visit this way? If not, do you need us to send you a link now or would you like your provider to send a link via text or email when they are ready to initiate the visit?\"      Patient Confirmed they will join visit via: Text Link to Cell Phone  Reminded patient to ensure they were logged on to virtual visit by arrival time listed.   Asked if patient has flexibility to initiate visit sooner than arrival time: patient is unable to initiate visit earlier than arrival time     If pediatric virtual visit, ensured pediatric patient along with parent/guardian will be present for video visit.     Patient offered the website [email or web address] and/or phone number to Withings Help line: [phone]    Kevin is a 54 year old who is being evaluated via a billable video visit.    How would you like to obtain your AVS? Mail a copy  If the video visit is dropped, the invitation should be resent by: Text to cell phone: [phone]  Will anyone else be joining your video visit? No      Assessment & Plan     Chronic pain of right knee  Longstanding issue.  Still considering whether or not to have surgery.  In the meantime we will continue to follow  - oxyCODONE-acetaminophen (PERCOCET) 7.5-325 MG per tablet; Take 1-2 tablets by mouth 3 times daily as needed for pain    Chronic, continuous use of opioids  Up-to-date on routine monitoring  - oxyCODONE-acetaminophen (PERCOCET) 7.5-325 MG per tablet; Take 1-2 tablets by mouth 3 times daily as needed for pain    Type 2 diabetes mellitus with hyperglycemia, without long-term current use of insulin (H)  Last A1c was 8.0.  Due to recheck and hopefully will be less than 8  - pioglitazone (ACTOS) 45 MG tablet; Take 1 tablet (45 mg) by mouth daily Profile Rx  - empagliflozin (JARDIANCE) 10 MG " "TABS tablet; Take 1 tablet (10 mg) by mouth daily  - glimepiride (AMARYL) 4 MG tablet; Take 2 tablets (8 mg) by mouth daily Profile Rx  - metFORMIN (GLUCOPHAGE) 500 MG tablet; Take 2 tablets (1,000 mg) by mouth 2 times daily (with meals) Profile Rx    Severe obesity (BMI 35.0-39.9) with comorbidity (H)  Discussed diet and exercise    Hypertension goal BP (blood pressure) < 140/90  Stable on current regimen.  Continue same plan and routine follow-up.   - chlorthalidone (HYGROTON) 25 MG tablet; Take 1 tablet (25 mg) by mouth daily Profile Rx  - losartan (COZAAR) 50 MG tablet; Take 1 tablet (50 mg) by mouth daily Profile Rx    CARDIOVASCULAR SCREENING; LDL GOAL LESS THAN 70  Numbers have been good without any statin.  Continue to follow      BMI  Estimated body mass index is 38.17 kg/m  as calculated from the following:    Height as of 11/14/23: 1.626 m (5' 4\").    Weight as of 2/8/24: 100.9 kg (222 lb 6.4 oz).   Weight management plan: Discussed healthy diet and exercise guidelines      Work on weight loss  Regular exercise  See Patient Instructions    Leia Brown is a 54 year old, presenting for the following health issues:  Knee Pain (Follow up)        4/29/2024     2:31 PM   Additional Questions   Roomed by Faizan MOMIN     History of Present Illness       Reason for visit:  Pain Follow up        Pain History:  When did you first notice your pain? Has been going on for years   Have you seen this provider for your pain in the past? Yes   Where in your body do you have pain? Knees  Are you seeing anyone else for your pain? No        8/22/2022     8:45 AM 10/5/2022    10:08 AM 11/14/2023     2:15 PM   PHQ-9 SCORE   PHQ-9 Total Score MyChart 2 (Minimal depression) 9 (Mild depression) 3 (Minimal depression)   PHQ-9 Total Score 2 9 3           12/23/2022     9:30 AM 2/14/2023     9:31 AM 6/19/2023    12:43 PM   MIKE-7 SCORE   Total Score 1 (minimal anxiety) 0 (minimal anxiety) 1 (minimal anxiety)   Total Score 1 0 1 "               12/23/2022     9:37 AM 4/29/2024     2:23 PM   PEG Score   PEG Total Score 2.33 4.33         Visit with patient today to follow-up on knee pain as well as ongoing chronic issues      Review of Systems  Constitutional, HEENT, cardiovascular, pulmonary, gi and gu systems are negative, except as otherwise noted.      Objective           Vitals:  No vitals were obtained today due to virtual visit.    Physical Exam   GENERAL: alert and no distress  EYES: Eyes grossly normal to inspection.  No discharge or erythema, or obvious scleral/conjunctival abnormalities.  RESP: No audible wheeze, cough, or visible cyanosis.    SKIN: Visible skin clear. No significant rash, abnormal pigmentation or lesions.  NEURO: Cranial nerves grossly intact.  Mentation and speech appropriate for age.  PSYCH: Appropriate affect, tone, and pace of words    Past labs reviewed with the patient.       Video-Visit Details    Type of service:  Video Visit   Originating Location (pt. Location): Home    Distant Location (provider location):  Off-site  Platform used for Video Visit: Ruben  Signed Electronically by: Lizett Terry MD

## 2024-05-30 ENCOUNTER — TELEPHONE (OUTPATIENT)
Dept: FAMILY MEDICINE | Facility: CLINIC | Age: 54
End: 2024-05-30
Payer: COMMERCIAL

## 2024-05-30 DIAGNOSIS — F11.90 CHRONIC, CONTINUOUS USE OF OPIOIDS: ICD-10-CM

## 2024-05-30 DIAGNOSIS — M25.561 CHRONIC PAIN OF RIGHT KNEE: ICD-10-CM

## 2024-05-30 DIAGNOSIS — G89.29 CHRONIC PAIN OF RIGHT KNEE: ICD-10-CM

## 2024-05-30 DIAGNOSIS — E11.65 TYPE 2 DIABETES MELLITUS WITH HYPERGLYCEMIA, WITHOUT LONG-TERM CURRENT USE OF INSULIN (H): Primary | ICD-10-CM

## 2024-05-30 RX ORDER — OXYCODONE AND ACETAMINOPHEN 7.5; 325 MG/1; MG/1
1-2 TABLET ORAL 3 TIMES DAILY PRN
Qty: 150 TABLET | Refills: 0 | Status: SHIPPED | OUTPATIENT
Start: 2024-05-30

## 2024-05-30 RX ORDER — ACYCLOVIR 400 MG/1
1 TABLET ORAL
Qty: 9 EACH | Refills: 5 | Status: SHIPPED | OUTPATIENT
Start: 2024-05-30

## 2024-05-30 NOTE — TELEPHONE ENCOUNTER
Please discuss with patient and pend needed medications. Most were already sent on 4/29/24 for 90 day supply. Is he wanting additional prescriptions sent for all of these?    Bethany AMARON, RN

## 2024-05-30 NOTE — TELEPHONE ENCOUNTER
Patient's will not have insurance after 5/31/24. He is needing a refill for oxyCODONE-acetaminophen (PERCOCET) 7.5-325 MG per tablet  but may need providers approval for early fill.     Also needs refill on Dexcon G7 Sensors (10 Day). Did not see this on patient's med list.     Please advise patient of the status.

## 2024-05-30 NOTE — TELEPHONE ENCOUNTER
Medication Question or Refill        What medication are you calling about (include dose and sig)?:     All medications that PCP prescribes    Preferred Pharmacy:   [facility]/pharmacy #[id number] - [place], [place] - [address] [facility]  [address] [place] [address]  Phone: [phone] Fax: [phone]      Controlled Substance Agreement on file:   CSA -- Patient Level:     [Media Unavailable] Controlled Substance Agreement - Opioid - Scan on 9/1/2023  1:51 PM   [Media Unavailable] Controlled Substance Agreement - Opioid - Scan on 5/24/2022 12:11 PM   [Media Unavailable] Controlled Substance Agreement - Opioid - Scan on 3/20/2021  4:56 PM   [Media Unavailable] Controlled Substance Agreement - Opioid - Scan on 12/19/2018  1:21 PM       Who prescribed the medication?: dr. castro    Do you need a refill? Yes    When did you use the medication last? Pt uses this daily    Do you have any questions or concerns?      Please do asap as this is time sensitive    insurance is ending at the end of may (5/31, 6/1 pt will no longer has insurance), did make PCP aware. PCP needs to send all meds over to pharmacy once more time before insurance ends.      Could we send this information to you in [facility] or would you prefer to receive a phone call?:   Patient would prefer a phone call     Okay to leave a detailed message?: Yes at Cell number on file:    Telephone Information:   Mobile [phone]

## 2024-05-30 NOTE — TELEPHONE ENCOUNTER
Writer called patient to verify what he needed. He states he just needs the Dexcom 7 sensor as he is running low and states he is down to about a week of oxycodone left. Writer verified with patient that his oxycodone was filled on May 1st and also confirms that he is running low and has about a weeks worth left. He wanted to try and get a refill before his insurance runs out on the first.     Routing to provider to review and advise on oxycodone and dexcom refill. Oxycodone pended, unable to find Decom 3 listed.    Ton Estrada RN  [facility]

## 2024-05-31 ENCOUNTER — TELEPHONE (OUTPATIENT)
Dept: FAMILY MEDICINE | Facility: CLINIC | Age: 54
End: 2024-05-31
Payer: COMMERCIAL

## 2024-05-31 DIAGNOSIS — E11.65 TYPE 2 DIABETES MELLITUS WITH HYPERGLYCEMIA, WITHOUT LONG-TERM CURRENT USE OF INSULIN (H): Primary | ICD-10-CM

## 2024-05-31 DIAGNOSIS — G89.29 CHRONIC PAIN OF RIGHT KNEE: ICD-10-CM

## 2024-05-31 DIAGNOSIS — M25.561 CHRONIC PAIN OF RIGHT KNEE: ICD-10-CM

## 2024-05-31 DIAGNOSIS — I10 HYPERTENSION GOAL BP (BLOOD PRESSURE) < 140/90: ICD-10-CM

## 2024-05-31 DIAGNOSIS — E11.65 TYPE 2 DIABETES MELLITUS WITH HYPERGLYCEMIA, WITHOUT LONG-TERM CURRENT USE OF INSULIN (H): ICD-10-CM

## 2024-05-31 RX ORDER — CHLORTHALIDONE 25 MG/1
25 TABLET ORAL DAILY
Qty: 90 TABLET | Refills: 0 | Status: SHIPPED | OUTPATIENT
Start: 2024-05-31 | End: 2024-08-23

## 2024-05-31 RX ORDER — LOSARTAN POTASSIUM 50 MG/1
50 TABLET ORAL DAILY
Qty: 90 TABLET | Refills: 0 | Status: SHIPPED | OUTPATIENT
Start: 2024-05-31

## 2024-05-31 RX ORDER — GLIMEPIRIDE 4 MG/1
8 TABLET ORAL DAILY
Qty: 180 TABLET | Refills: 0 | Status: SHIPPED | OUTPATIENT
Start: 2024-05-31 | End: 2024-08-23

## 2024-05-31 RX ORDER — PIOGLITAZONEHYDROCHLORIDE 45 MG/1
45 TABLET ORAL DAILY
Qty: 90 TABLET | Refills: 0 | Status: SHIPPED | OUTPATIENT
Start: 2024-05-31

## 2024-05-31 NOTE — TELEPHONE ENCOUNTER
Attempted to called pharmacy to relay message below. Pharmacy is currently close due to lunch break. Will recall when open.     DOMINGO Eldridge  [facility]

## 2024-05-31 NOTE — TELEPHONE ENCOUNTER
Pt has refill at pharmacy,.  He states today is last day of insurance. Pharmacy states they can't fill the percocet until June 2.      Can you ok fill for today?  Henny Moe BSN, RN

## 2024-05-31 NOTE — TELEPHONE ENCOUNTER
Called [facility] pharmacy and spoke with Natali pharmacist. Relayed provider's message below. Natali  can process early fill but pharmacy only has 100 tablets left and will need to void the remaining 50 tablets ([facility] PCP can determine how to handle remaining 50 tablets.) Per pharmacy cost with insurance is $10 but if no insurance, cost would be $32 with coupon.    Called patient and relayed above information. Patient states would like to proceed with early fill for 100 tablets, states does not want to take the risk in case cost is not $32.    Called Natali back and informed of patient's decision. [facility] will start on refill process and will need to call insurance and will update patient if needed.     Routing to provider as FYI and to advise further on remaining 50 tablets?     DOMINGO Eldridge  [facility]

## 2024-05-31 NOTE — TELEPHONE ENCOUNTER
Pt notified of provider message as written.  Pt verbalized good understanding. Offered care coordination to speak with  about how to get insurance.  Pt would like this, referral done.  Henny AMARON, RN

## 2024-05-31 NOTE — TELEPHONE ENCOUNTER
Medication Question or Refill    Contacts         Type Contact Phone/Fax    05/31/2024 12:59 PM CDT Phone (Incoming) Kevin Mcguire (Self) [phone] (M)            What medication are you calling about (include dose and sig)?: oxyCODONE-acetaminophen (PERCOCET) 7.5-325 MG per tablet     Preferred Pharmacy:   [facility]/pharmacy #[id number] - [place], [place] - [address]  [address] [place] [address]  Phone: [phone] Fax: [phone]      Controlled Substance Agreement on file:   CSA -- Patient Level:     [Media Unavailable] Controlled Substance Agreement - Opioid - Scan on 9/1/2023  1:51 PM   [Media Unavailable] Controlled Substance Agreement - Opioid - Scan on 5/24/2022 12:11 PM   [Media Unavailable] Controlled Substance Agreement - Opioid - Scan on 3/20/2021  4:56 PM   [Media Unavailable] Controlled Substance Agreement - Opioid - Scan on 12/19/2018  1:21 PM       Who prescribed the medication?: Dr Terry    Do you need a refill? Yes    When did you use the medication last? na    Patient offered an appointment? No    Do you have any questions or concerns?  Yes: Pt needs this refill to be filled today and send a request to allow to fill it early because pt insurance is ending today.       Could we send this information to you in [facility] or would you prefer to receive a phone call?:   Patient would prefer a phone call   Okay to leave a detailed message?: Yes at Cell number on file:    Telephone Information:   Mobile [phone]

## 2024-05-31 NOTE — TELEPHONE ENCOUNTER
I would say the thing to do is to just fill the 100 tablets of the 10 next month when we need a refill we can utilize the coupon then for full refill

## 2024-06-03 ENCOUNTER — PATIENT OUTREACH (OUTPATIENT)
Dept: CARE COORDINATION | Facility: CLINIC | Age: 54
End: 2024-06-03
Payer: COMMERCIAL

## 2024-06-03 DIAGNOSIS — Z71.89 OTHER SPECIFIED COUNSELING: Primary | Chronic | ICD-10-CM

## 2024-06-03 NOTE — PROGRESS NOTES
Clinic Care Coordination Contact  Community Health Worker Initial Outreach    CHW Initial Information Gathering:  Referral Source: PCP  Preferred Urgent Care: Other ([facility])  Supplies Currently Used at Home: None  Equipment Currently Used at Home: none  No PCP office visit in Past Year: No       Patient accepts CC: Yes. Patient scheduled for assessment with the SW on 6/5/24 at 9:00 am. Patient noted desire to discuss obtaining insurance at this time, and he would like to be able to find a new job. The CHW did not complete the CHW initial questionnaire due to the patient being in a store at the time of the call.     MATHEW Goyal  [phone]  [facility]

## 2024-06-04 ENCOUNTER — PATIENT OUTREACH (OUTPATIENT)
Dept: CARE COORDINATION | Facility: CLINIC | Age: 54
End: 2024-06-04
Payer: COMMERCIAL

## 2024-06-05 ENCOUNTER — PATIENT OUTREACH (OUTPATIENT)
Dept: NURSING | Facility: CLINIC | Age: 54
End: 2024-06-05
Payer: COMMERCIAL

## 2024-06-05 NOTE — PROGRESS NOTES
Clinic Care Coordination Contact     initial phone assessment.  Patient needs insurance.  FRW referral made by CHW.  Patient reports he has received information and the # for [place] [employer] to call and discuss insurance.  He refilled all of his medications before insurance ended 5/31/2024.  He plans to call [place] [employer] and discuss options. He needs nothing further from [facility] will not intervene further     Marcelle Zavala, RASHEEDA, MSW   [facility]  Care Coordination  [facility]  [phone]  6/5/2024 9:05 AM

## 2024-06-24 NOTE — TELEPHONE ENCOUNTER
Patient requesting a refill.  Leila BUTLER - [place]     
Reviewed [place]NPMP and last filled 8/17/21   Filling monthly  No fills outside of this office  csa 3/20/21.  uds 8/17/21   Last visit one month ago  Med refilled       
Routing refill request to provider for review/approval because:  Drug not on the [facility] refill protocol     Vee Mullins RN    
121

## 2024-06-30 ENCOUNTER — HEALTH MAINTENANCE LETTER (OUTPATIENT)
Age: 54
End: 2024-06-30

## 2024-08-07 NOTE — ADDENDUM NOTE
Addended by: ELIZABETH WAGONER on: 6/12/2019 10:32 AM     Modules accepted: Renetta     From: Rosa Allred  To: Maria Elena García PA-C  Sent: 8/7/2024 1:47 PM CDT  Subject: Xray    Hi!   I just wanted to follow up on my hand xrays I had done. I never heard back from them.     Thanks!

## 2024-08-23 DIAGNOSIS — I10 HYPERTENSION GOAL BP (BLOOD PRESSURE) < 140/90: ICD-10-CM

## 2024-08-23 DIAGNOSIS — E11.65 TYPE 2 DIABETES MELLITUS WITH HYPERGLYCEMIA, WITHOUT LONG-TERM CURRENT USE OF INSULIN (H): ICD-10-CM

## 2024-08-23 RX ORDER — GLIMEPIRIDE 4 MG/1
TABLET ORAL
Qty: 180 TABLET | Refills: 0 | Status: SHIPPED | OUTPATIENT
Start: 2024-08-23

## 2024-08-23 RX ORDER — CHLORTHALIDONE 25 MG/1
TABLET ORAL
Qty: 90 TABLET | Refills: 0 | Status: SHIPPED | OUTPATIENT
Start: 2024-08-23

## 2024-09-17 NOTE — TELEPHONE ENCOUNTER
Controlled Substance Refill Request for Percocet  Problem List Complete:  Yes  Chronic pain syndrome   Problem Detail     Noted:  3/14/2016   Priority:  Medium   Overview Addendum 10/22/2019  2:30 PM by Lida Olsen, RN   Patient is followed by LIZETT GUZMAN for ongoing prescription of pain medication.  All refills should be approved by this provider, or covering partner.     Medication(s): percocet 7.5.   Maximum quantity per month: 150 = 56.25 MED  Narcan n/a  Clinic visit frequency required: Q 3  months      Controlled substance agreement on file: Yes       Date(s): 12/18/18     Pain Clinic evaluation in the past: No     DIRE Total Score(s):    8/10/2015   Total Score 20         Last MNP website verification:  10/22/19    [email or web address]      checked in past 3 months?  Yes 10/22/19   Last Written Prescription Date:  9/20/19  Last Fill Quantity: 150 tablets  # refills: 0   Last office visit: 8/21/2019 with prescribing provider:  8/21/19   Future Office Visit:   Next 5 appointments (look out 90 days)    Nov 20, 2019  8:20 AM CST  [facility] [address] [place] [id number] [phone]         RX monitoring program ([place]MP) reviewed:  reviewed- no concerns    M[place]MP profile:  [email or web address]      Lida Olsen RN, BSN, PHN          
Reason for Call:  Medication or medication refill:    Do you use a [facility]?  Name of the pharmacy and phone number for the current request:  [id number] - [place], [place] - [address] [facility] [address] [facility] [address] [facility]     Name of the medication requested: oxyCODONE-acetaminophen (PERCOCET) 7.5-325 MG per tablet      Can we leave a detailed message on this number? YES    Phone number patient can be reached at: Home number on file [phone] (home)    Best Time: anytime    Call taken on 10/21/2019 at 9:29 AM by George Mccloud    
Refilled   
Requested Prescriptions   Pending Prescriptions Disp Refills     oxyCODONE-acetaminophen (PERCOCET) 7.5-325 MG per tablet 150 tablet 0     Sig: Take 1-2 tablets by mouth 3 times daily as needed for pain       There is no refill protocol information for this order          oxyCODONE-acetaminophen (PERCOCET) 7.5-325 MG per tablet      Last Written Prescription Date:  9/20/19  Last Fill Quantity: 150,   # refills: 0  Last Office Visit: 8/21/19  Future Office visit:    Next 5 appointments (look out 90 days)    Nov 20, 2019  8:20 AM CST  [facility] [address] [place] [id number] [phone]           Routing refill request to provider for review/approval because:  Drug not on the [facility], UMP or [facility] refill protocol or controlled substance    
(4) no impairment

## 2025-01-05 ENCOUNTER — HEALTH MAINTENANCE LETTER (OUTPATIENT)
Age: 55
End: 2025-01-05

## 2025-02-27 ENCOUNTER — TELEPHONE (OUTPATIENT)
Dept: FAMILY MEDICINE | Facility: CLINIC | Age: 55
End: 2025-02-27
Payer: COMMERCIAL

## 2025-02-27 ENCOUNTER — ORDERS ONLY (AUTO-RELEASED) (OUTPATIENT)
Dept: FAMILY MEDICINE | Facility: CLINIC | Age: 55
End: 2025-02-27
Payer: COMMERCIAL

## 2025-02-27 DIAGNOSIS — Z12.11 COLON CANCER SCREENING: ICD-10-CM

## 2025-02-27 DIAGNOSIS — F11.90 CHRONIC, CONTINUOUS USE OF OPIOIDS: ICD-10-CM

## 2025-02-27 DIAGNOSIS — E11.65 TYPE 2 DIABETES MELLITUS WITH HYPERGLYCEMIA, WITHOUT LONG-TERM CURRENT USE OF INSULIN (H): Primary | ICD-10-CM

## 2025-02-27 NOTE — TELEPHONE ENCOUNTER
Lab work has been ordered.  If I have a 40-minute same-day slot we can use that.  Would be best to be in person so we can check on blood pressure.  Cannot be a physical.  I cannot squeeze in physicals.  But if the appoint with Dr. Valdez is sooner than I have then patient can just have the lab work done and see him.

## 2025-02-27 NOTE — TELEPHONE ENCOUNTER
Reason for Call:  Appointment Request    Patient requesting this type of appt: Chronic Diease Management/Medication/Follow-Up    Requested provider: Lizett Terry    Reason patient unable to be scheduled: Not within requested timeframe    When does patient want to be seen/preferred time:  asap    Comments: Pt just got insurance again and is looking to do a diabetic check, gets labs and med review. He is hoping to get in with PCP asap, he did schedule an appt with Dr. Valdez just in case.    Could we send this information to you in [employer] [employer] or would you prefer to receive a phone call?:   Patient would prefer a phone call   Okay to leave a detailed message?: Yes at Cell number on file:    Telephone Information:   Mobile [phone]       Call taken on 2/27/2025 at 11:37 AM by Roxana Kruger

## 2025-02-27 NOTE — TELEPHONE ENCOUNTER
Attempted to call Pt mail box is full. Pt needs another attempt same day Apt OK per PCP note Pt will also need a lab.     Nithya HO Visit Facilitator

## 2025-03-06 ENCOUNTER — LAB (OUTPATIENT)
Dept: LAB | Facility: CLINIC | Age: 55
End: 2025-03-06

## 2025-03-06 DIAGNOSIS — F11.90 CHRONIC, CONTINUOUS USE OF OPIOIDS: ICD-10-CM

## 2025-03-06 DIAGNOSIS — E11.65 TYPE 2 DIABETES MELLITUS WITH HYPERGLYCEMIA, WITHOUT LONG-TERM CURRENT USE OF INSULIN (H): ICD-10-CM

## 2025-03-06 LAB
ALBUMIN SERPL BCG-MCNC: 4.3 G/DL (ref 3.5–5.2)
ALP SERPL-CCNC: 79 U/L (ref 40–150)
ALT SERPL W P-5'-P-CCNC: 15 U/L (ref 0–70)
AMPHETAMINES UR QL SCN: NORMAL
ANION GAP SERPL CALCULATED.3IONS-SCNC: 10 MMOL/L (ref 7–15)
AST SERPL W P-5'-P-CCNC: 21 U/L (ref 0–45)
BARBITURATES UR QL SCN: NORMAL
BENZODIAZ UR QL SCN: NORMAL
BILIRUB SERPL-MCNC: 0.3 MG/DL
BUN SERPL-MCNC: 13 MG/DL (ref 6–20)
BZE UR QL SCN: NORMAL
CALCIUM SERPL-MCNC: 9.4 MG/DL (ref 8.8–10.4)
CANNABINOIDS UR QL SCN: NORMAL
CHLORIDE SERPL-SCNC: 102 MMOL/L (ref 98–107)
CHOLEST SERPL-MCNC: 163 MG/DL
CREAT SERPL-MCNC: 1.2 MG/DL (ref 0.67–1.17)
CREAT UR-MCNC: 127 MG/DL
EGFRCR SERPLBLD CKD-EPI 2021: 71 ML/MIN/1.73M2
EST. AVERAGE GLUCOSE BLD GHB EST-MCNC: 186 MG/DL
FASTING STATUS PATIENT QL REPORTED: NO
FASTING STATUS PATIENT QL REPORTED: NO
FENTANYL UR QL: NORMAL
GLUCOSE SERPL-MCNC: 133 MG/DL (ref 70–99)
HBA1C MFR BLD: 8.1 % (ref 0–5.6)
HCO3 SERPL-SCNC: 25 MMOL/L (ref 22–29)
HDLC SERPL-MCNC: 47 MG/DL
LDLC SERPL CALC-MCNC: 84 MG/DL
MICROALBUMIN UR-MCNC: <12 MG/L
MICROALBUMIN/CREAT UR: NORMAL MG/G{CREAT}
NONHDLC SERPL-MCNC: 116 MG/DL
OPIATES UR QL SCN: NORMAL
PCP QUAL URINE (ROCHE): NORMAL
POTASSIUM SERPL-SCNC: 3.4 MMOL/L (ref 3.4–5.3)
PROT SERPL-MCNC: 7.3 G/DL (ref 6.4–8.3)
SODIUM SERPL-SCNC: 137 MMOL/L (ref 135–145)
TRIGL SERPL-MCNC: 162 MG/DL
TSH SERPL DL<=0.005 MIU/L-ACNC: 1.55 UIU/ML (ref 0.3–4.2)

## 2025-03-10 ENCOUNTER — PATIENT OUTREACH (OUTPATIENT)
Dept: CARE COORDINATION | Facility: CLINIC | Age: 55
End: 2025-03-10
Payer: COMMERCIAL

## 2025-03-10 DIAGNOSIS — N52.9 ERECTILE DYSFUNCTION, UNSPECIFIED ERECTILE DYSFUNCTION TYPE: ICD-10-CM

## 2025-03-10 RX ORDER — TADALAFIL 20 MG/1
TABLET ORAL
Qty: 20 TABLET | Refills: 5 | Status: SHIPPED | OUTPATIENT
Start: 2025-03-10

## 2025-03-12 ENCOUNTER — PATIENT OUTREACH (OUTPATIENT)
Dept: CARE COORDINATION | Facility: CLINIC | Age: 55
End: 2025-03-12
Payer: COMMERCIAL

## 2025-04-10 ENCOUNTER — TELEPHONE (OUTPATIENT)
Dept: FAMILY MEDICINE | Facility: CLINIC | Age: 55
End: 2025-04-10
Payer: COMMERCIAL

## 2025-04-10 DIAGNOSIS — M25.561 CHRONIC PAIN OF RIGHT KNEE: ICD-10-CM

## 2025-04-10 DIAGNOSIS — F11.90 CHRONIC, CONTINUOUS USE OF OPIOIDS: ICD-10-CM

## 2025-04-10 DIAGNOSIS — G89.29 CHRONIC PAIN OF RIGHT KNEE: ICD-10-CM

## 2025-04-10 RX ORDER — OXYCODONE AND ACETAMINOPHEN 7.5; 325 MG/1; MG/1
1-2 TABLET ORAL 3 TIMES DAILY PRN
Qty: 150 TABLET | Refills: 0 | Status: SHIPPED | OUTPATIENT
Start: 2025-04-10

## 2025-04-10 NOTE — TELEPHONE ENCOUNTER
Reason for Call:  Medication or medication refill:    Do you use a [facility]?  Name of the pharmacy and phone number for the current request:  [facility] in [place], [place] [address] [facility]     Name of the medication requested: oxyCODONE-acetaminophen (PERCOCET) 7.5-325 MG per tablet     Other request: Pt is out of medication.     Can we leave a detailed message on this number? YES    Phone number patient can be reached at: Cell number on file:    Telephone Information:   Mobile [phone]       Call taken on 4/10/2025 at 10:06 AM by Nithya Stuart

## 2025-05-08 ENCOUNTER — PATIENT OUTREACH (OUTPATIENT)
Dept: CARE COORDINATION | Facility: CLINIC | Age: 55
End: 2025-05-08
Payer: COMMERCIAL

## 2025-05-28 ENCOUNTER — TELEPHONE (OUTPATIENT)
Dept: FAMILY MEDICINE | Facility: CLINIC | Age: 55
End: 2025-05-28
Payer: COMMERCIAL

## 2025-05-28 NOTE — TELEPHONE ENCOUNTER
Reason for Call:  Appointment Request    Patient requesting this type of appt: Chronic Diease Management/Medication/Follow-Up    Requested provider: Lizett Terry    Reason patient unable to be scheduled: Not within requested timeframe    When does patient want to be seen/preferred time: Beginning of July    Comments: Pt made an appt for 08/11 for a diabetic check but is hoping to get in sooner. Would like this to be in person    Could we send this information to you in [facility] or would you prefer to receive a phone call?:   Patient would prefer a phone call   Okay to leave a detailed message?: Yes at Cell number on file:    Telephone Information:   Mobile [phone]       Call taken on 5/28/2025 at 4:09 PM by Roxana Kruger

## 2025-06-09 DIAGNOSIS — E11.65 TYPE 2 DIABETES MELLITUS WITH HYPERGLYCEMIA, WITHOUT LONG-TERM CURRENT USE OF INSULIN (H): ICD-10-CM

## 2025-06-09 DIAGNOSIS — J20.9 ACUTE BRONCHITIS, UNSPECIFIED ORGANISM: ICD-10-CM

## 2025-06-09 RX ORDER — IPRATROPIUM BROMIDE AND ALBUTEROL SULFATE 2.5; .5 MG/3ML; MG/3ML
1 SOLUTION RESPIRATORY (INHALATION) EVERY 6 HOURS PRN
Qty: 270 ML | Refills: 1 | Status: SHIPPED | OUTPATIENT
Start: 2025-06-09

## 2025-06-09 RX ORDER — ALBUTEROL SULFATE 90 UG/1
2 INHALANT RESPIRATORY (INHALATION) EVERY 4 HOURS PRN
Qty: 36 G | Refills: 3 | Status: SHIPPED | OUTPATIENT
Start: 2025-06-09

## 2025-06-09 RX ORDER — LANCETS
EACH MISCELLANEOUS
Qty: 100 EACH | Refills: 6 | Status: SHIPPED | OUTPATIENT
Start: 2025-06-09

## 2025-06-10 NOTE — TELEPHONE ENCOUNTER
Pharmacy Requesting 3 discontinued meds:    -ipratropium - albuterol 0.5 mg/2.5 mg/3 mL (DUONEB) 0.5-2.5 (3) MG/3ML nebulization (Discontinued)     -blood glucose monitoring (SOFTCLIX) lancets (Discontinued)     -ACCU-CHEK GUIDE test strip (Discontinued)   
Called patient to gather additional information as requested by refill team:    Prescription requested: Duo-neb, blood glucose monitoring lancets, accu-chek test strips.    Patient states that he uses the dexcom continuous monitor but he likes to take a break between wears. Patient requesting lancets and test strips for when he takes of the dexcom    Patient reports that he has asthma (denies symptoms at this time) and is requesting a refill on the Duo-neb for flare ups and is also requesting renewal of albuterol inhaler.     Routing to provider, please review and advise. Clarissa Perez, RN, BSN, PHN    
Please call to triage - duoneb last prescribed 2016.  
Refilled - upcoming appointment. 8/11  
decreased tone throughout;

## 2025-07-09 DIAGNOSIS — G89.29 CHRONIC PAIN OF RIGHT KNEE: ICD-10-CM

## 2025-07-09 DIAGNOSIS — F11.90 CHRONIC, CONTINUOUS USE OF OPIOIDS: ICD-10-CM

## 2025-07-09 DIAGNOSIS — M25.561 CHRONIC PAIN OF RIGHT KNEE: ICD-10-CM

## 2025-07-09 RX ORDER — OXYCODONE AND ACETAMINOPHEN 7.5; 325 MG/1; MG/1
1-2 TABLET ORAL 3 TIMES DAILY PRN
Qty: 150 TABLET | Refills: 0 | Status: SHIPPED | OUTPATIENT
Start: 2025-07-09 | End: 2025-07-10

## 2025-07-10 DIAGNOSIS — M25.561 CHRONIC PAIN OF RIGHT KNEE: ICD-10-CM

## 2025-07-10 DIAGNOSIS — F11.90 CHRONIC, CONTINUOUS USE OF OPIOIDS: ICD-10-CM

## 2025-07-10 DIAGNOSIS — G89.29 CHRONIC PAIN OF RIGHT KNEE: ICD-10-CM

## 2025-07-10 RX ORDER — OXYCODONE AND ACETAMINOPHEN 7.5; 325 MG/1; MG/1
1-2 TABLET ORAL 3 TIMES DAILY PRN
Qty: 150 TABLET | Refills: 0 | Status: SHIPPED | OUTPATIENT
Start: 2025-07-10

## 2025-07-10 RX ORDER — OXYCODONE AND ACETAMINOPHEN 7.5; 325 MG/1; MG/1
1-2 TABLET ORAL 3 TIMES DAILY PRN
Qty: 150 TABLET | Refills: 0 | OUTPATIENT
Start: 2025-07-10

## 2025-07-10 NOTE — TELEPHONE ENCOUNTER
Medication Question or Refill    Pt is asking for Rx to be sent to [facility] pharmacy because it it way cheaper than [facility].     He is asking for a call when this has been sent  Contacts       Contact Date/Time Type Contact Phone/Fax    07/10/2025 11:38 AM CDT Phone (Incoming) Maynor Kevin Denson (Self) [phone] (M)            What medication are you calling about (include dose and sig)?: Oxycodone     Preferred Pharmacy:[facility]/pharmacy #[id number] - [place], [place] - [address] [address]  Phone: [phone] Fax: [phone]      Controlled Substance Agreement on file:   CSA -- Patient Level:     [Media Unavailable] Controlled Substance Agreement - Opioid - Scan on 3/10/2025  6:59 AM: Controlled Substance Agreement Opioid 2025   [Media Unavailable] Controlled Substance Agreement - Opioid - Scan on 9/1/2023  1:51 PM   [Media Unavailable] Controlled Substance Agreement - Opioid - Scan on 5/24/2022 12:11 PM   [Media Unavailable] Controlled Substance Agreement - Opioid - Scan on 3/20/2021  4:56 PM   [Media Unavailable] Controlled Substance Agreement - Opioid - Scan on 12/19/2018  1:21 PM

## 2025-07-13 ENCOUNTER — HEALTH MAINTENANCE LETTER (OUTPATIENT)
Age: 55
End: 2025-07-13

## 2025-08-11 ENCOUNTER — OFFICE VISIT (OUTPATIENT)
Dept: FAMILY MEDICINE | Facility: CLINIC | Age: 55
End: 2025-08-11
Payer: COMMERCIAL

## 2025-08-11 VITALS
HEART RATE: 70 BPM | DIASTOLIC BLOOD PRESSURE: 82 MMHG | RESPIRATION RATE: 20 BRPM | OXYGEN SATURATION: 99 % | SYSTOLIC BLOOD PRESSURE: 134 MMHG | TEMPERATURE: 97.8 F | HEIGHT: 68 IN | BODY MASS INDEX: 33.73 KG/M2 | WEIGHT: 222.56 LBS

## 2025-08-11 DIAGNOSIS — E11.65 TYPE 2 DIABETES MELLITUS WITH HYPERGLYCEMIA, WITHOUT LONG-TERM CURRENT USE OF INSULIN (H): Primary | ICD-10-CM

## 2025-08-11 DIAGNOSIS — M25.562 LEFT KNEE PAIN, UNSPECIFIED CHRONICITY: ICD-10-CM

## 2025-08-11 DIAGNOSIS — J20.9 ACUTE BRONCHITIS, UNSPECIFIED ORGANISM: ICD-10-CM

## 2025-08-11 DIAGNOSIS — Z12.5 PROSTATE CANCER SCREENING: ICD-10-CM

## 2025-08-11 DIAGNOSIS — I10 HYPERTENSION GOAL BP (BLOOD PRESSURE) < 140/90: ICD-10-CM

## 2025-08-11 DIAGNOSIS — F11.90 CHRONIC, CONTINUOUS USE OF OPIOIDS: ICD-10-CM

## 2025-08-11 DIAGNOSIS — E66.01 SEVERE OBESITY (BMI 35.0-39.9) WITH COMORBIDITY (H): ICD-10-CM

## 2025-08-11 DIAGNOSIS — M25.561 CHRONIC PAIN OF RIGHT KNEE: ICD-10-CM

## 2025-08-11 DIAGNOSIS — G89.29 CHRONIC PAIN OF RIGHT KNEE: ICD-10-CM

## 2025-08-11 DIAGNOSIS — Z12.11 SCREEN FOR COLON CANCER: ICD-10-CM

## 2025-08-11 LAB
ANION GAP SERPL CALCULATED.3IONS-SCNC: 12 MMOL/L (ref 7–15)
BUN SERPL-MCNC: 17 MG/DL (ref 6–20)
CALCIUM SERPL-MCNC: 9.3 MG/DL (ref 8.8–10.4)
CHLORIDE SERPL-SCNC: 99 MMOL/L (ref 98–107)
CREAT SERPL-MCNC: 1.17 MG/DL (ref 0.67–1.17)
EGFRCR SERPLBLD CKD-EPI 2021: 73 ML/MIN/1.73M2
EST. AVERAGE GLUCOSE BLD GHB EST-MCNC: 203 MG/DL
GLUCOSE SERPL-MCNC: 192 MG/DL (ref 70–99)
HBA1C MFR BLD: 8.7 % (ref 0–5.6)
HCO3 SERPL-SCNC: 27 MMOL/L (ref 22–29)
POTASSIUM SERPL-SCNC: 3.4 MMOL/L (ref 3.4–5.3)
PSA SERPL DL<=0.01 NG/ML-MCNC: 4.33 NG/ML (ref 0–3.5)
SODIUM SERPL-SCNC: 138 MMOL/L (ref 135–145)

## 2025-08-11 PROCEDURE — G0103 PSA SCREENING: HCPCS | Performed by: FAMILY MEDICINE

## 2025-08-11 PROCEDURE — 99214 OFFICE O/P EST MOD 30 MIN: CPT | Performed by: FAMILY MEDICINE

## 2025-08-11 PROCEDURE — G2211 COMPLEX E/M VISIT ADD ON: HCPCS | Performed by: FAMILY MEDICINE

## 2025-08-11 PROCEDURE — 36415 COLL VENOUS BLD VENIPUNCTURE: CPT | Performed by: FAMILY MEDICINE

## 2025-08-11 PROCEDURE — 83036 HEMOGLOBIN GLYCOSYLATED A1C: CPT | Performed by: FAMILY MEDICINE

## 2025-08-11 PROCEDURE — 80048 BASIC METABOLIC PNL TOTAL CA: CPT | Performed by: FAMILY MEDICINE

## 2025-08-11 RX ORDER — OXYCODONE AND ACETAMINOPHEN 7.5; 325 MG/1; MG/1
1-2 TABLET ORAL 3 TIMES DAILY PRN
Qty: 150 TABLET | Refills: 0 | Status: SHIPPED | OUTPATIENT
Start: 2025-08-11

## 2025-08-11 RX ORDER — CHLORTHALIDONE 25 MG/1
25 TABLET ORAL DAILY
Qty: 90 TABLET | Refills: 1 | Status: SHIPPED | OUTPATIENT
Start: 2025-08-11

## 2025-08-11 RX ORDER — LOSARTAN POTASSIUM 50 MG/1
50 TABLET ORAL DAILY
Qty: 90 TABLET | Refills: 1 | Status: SHIPPED | OUTPATIENT
Start: 2025-08-11

## 2025-08-11 RX ORDER — ALBUTEROL SULFATE 90 UG/1
2 INHALANT RESPIRATORY (INHALATION) EVERY 4 HOURS PRN
Qty: 36 G | Refills: 3 | Status: SHIPPED | OUTPATIENT
Start: 2025-08-11

## 2025-08-11 RX ORDER — AMLODIPINE BESYLATE 10 MG/1
10 TABLET ORAL DAILY
Qty: 90 TABLET | Refills: 1 | Status: SHIPPED | OUTPATIENT
Start: 2025-08-11